# Patient Record
Sex: FEMALE | Race: WHITE | NOT HISPANIC OR LATINO | URBAN - METROPOLITAN AREA
[De-identification: names, ages, dates, MRNs, and addresses within clinical notes are randomized per-mention and may not be internally consistent; named-entity substitution may affect disease eponyms.]

---

## 2017-01-01 ENCOUNTER — INPATIENT HOSPITAL (OUTPATIENT)
Dept: URBAN - METROPOLITAN AREA HOSPITAL 107 | Facility: HOSPITAL | Age: 72
End: 2017-01-01
Payer: MEDICARE

## 2017-01-01 DIAGNOSIS — K92.2 GASTROINTESTINAL HEMORRHAGE, UNSPECIFIED: ICD-10-CM

## 2017-01-01 DIAGNOSIS — K92.1 MELENA: ICD-10-CM

## 2017-01-01 PROCEDURE — 99232 SBSQ HOSP IP/OBS MODERATE 35: CPT | Performed by: INTERNAL MEDICINE

## 2017-01-02 ENCOUNTER — INPATIENT HOSPITAL (OUTPATIENT)
Dept: URBAN - METROPOLITAN AREA HOSPITAL 107 | Facility: HOSPITAL | Age: 72
End: 2017-01-02
Payer: MEDICARE

## 2017-01-02 DIAGNOSIS — K44.9 DIAPHRAGMATIC HERNIA WITHOUT OBSTRUCTION OR GANGRENE: ICD-10-CM

## 2017-01-02 DIAGNOSIS — K92.2 GASTROINTESTINAL HEMORRHAGE, UNSPECIFIED: ICD-10-CM

## 2017-01-02 DIAGNOSIS — D12.5 BENIGN NEOPLASM OF SIGMOID COLON: ICD-10-CM

## 2017-01-02 PROCEDURE — 45385 COLONOSCOPY W/LESION REMOVAL: CPT | Performed by: INTERNAL MEDICINE

## 2017-01-02 PROCEDURE — 43239 EGD BIOPSY SINGLE/MULTIPLE: CPT | Performed by: INTERNAL MEDICINE

## 2017-01-03 ENCOUNTER — INPATIENT HOSPITAL (OUTPATIENT)
Dept: URBAN - METROPOLITAN AREA HOSPITAL 107 | Facility: HOSPITAL | Age: 72
End: 2017-01-03
Payer: MEDICARE

## 2017-01-03 DIAGNOSIS — D64.9 ANEMIA, UNSPECIFIED: ICD-10-CM

## 2017-01-03 DIAGNOSIS — K92.2 GASTROINTESTINAL HEMORRHAGE, UNSPECIFIED: ICD-10-CM

## 2017-01-03 PROCEDURE — 99231 SBSQ HOSP IP/OBS SF/LOW 25: CPT | Performed by: PHYSICIAN ASSISTANT

## 2017-01-04 PROCEDURE — 99232 SBSQ HOSP IP/OBS MODERATE 35: CPT | Performed by: PHYSICIAN ASSISTANT

## 2017-01-05 PROCEDURE — 99231 SBSQ HOSP IP/OBS SF/LOW 25: CPT | Performed by: PHYSICIAN ASSISTANT

## 2017-01-06 ENCOUNTER — INPATIENT HOSPITAL (OUTPATIENT)
Dept: URBAN - METROPOLITAN AREA HOSPITAL 107 | Facility: HOSPITAL | Age: 72
End: 2017-01-06
Payer: MEDICARE

## 2017-01-06 DIAGNOSIS — Z79.82 LONG TERM (CURRENT) USE OF ASPIRIN: ICD-10-CM

## 2017-01-06 DIAGNOSIS — K92.2 GASTROINTESTINAL HEMORRHAGE, UNSPECIFIED: ICD-10-CM

## 2017-01-06 DIAGNOSIS — Z79.01 LONG TERM (CURRENT) USE OF ANTICOAGULANTS: ICD-10-CM

## 2017-01-06 PROCEDURE — 99232 SBSQ HOSP IP/OBS MODERATE 35: CPT

## 2017-01-12 ENCOUNTER — OFFICE (OUTPATIENT)
Dept: URBAN - METROPOLITAN AREA CLINIC 75 | Facility: CLINIC | Age: 72
End: 2017-01-12

## 2017-01-12 DIAGNOSIS — Z98.890 OTHER SPECIFIED POSTPROCEDURAL STATES: ICD-10-CM

## 2017-01-12 DIAGNOSIS — D50.0 IRON DEFICIENCY ANEMIA SECONDARY TO BLOOD LOSS (CHRONIC): ICD-10-CM

## 2017-01-12 PROCEDURE — 91110 GI TRC IMG INTRAL ESOPH-ILE: CPT | Performed by: INTERNAL MEDICINE

## 2017-09-28 ENCOUNTER — APPOINTMENT (OUTPATIENT)
Dept: WOMENS IMAGING | Facility: HOSPITAL | Age: 72
End: 2017-09-28

## 2017-09-28 PROCEDURE — G0202 SCR MAMMO BI INCL CAD: HCPCS | Performed by: RADIOLOGY

## 2018-01-01 ENCOUNTER — INPATIENT HOSPITAL (OUTPATIENT)
Dept: URBAN - METROPOLITAN AREA HOSPITAL 107 | Facility: HOSPITAL | Age: 73
End: 2018-01-01
Payer: MEDICARE

## 2018-01-01 ENCOUNTER — OFFICE VISIT CONVERTED (OUTPATIENT)
Dept: PULMONOLOGY | Facility: CLINIC | Age: 73
End: 2018-01-01
Attending: INTERNAL MEDICINE

## 2018-01-01 ENCOUNTER — OFFICE (OUTPATIENT)
Dept: URBAN - METROPOLITAN AREA CLINIC 75 | Facility: CLINIC | Age: 73
End: 2018-01-01
Payer: MEDICARE

## 2018-01-01 VITALS
WEIGHT: 124 LBS | SYSTOLIC BLOOD PRESSURE: 122 MMHG | HEART RATE: 77 BPM | DIASTOLIC BLOOD PRESSURE: 80 MMHG | HEIGHT: 60 IN

## 2018-01-01 DIAGNOSIS — R10.13 EPIGASTRIC PAIN: ICD-10-CM

## 2018-01-01 DIAGNOSIS — K62.3 RECTAL PROLAPSE: ICD-10-CM

## 2018-01-01 DIAGNOSIS — R10.11 RIGHT UPPER QUADRANT PAIN: ICD-10-CM

## 2018-01-01 DIAGNOSIS — K31.82 DIEULAFOY LESION (HEMORRHAGIC) OF STOMACH AND DUODENUM: ICD-10-CM

## 2018-01-01 DIAGNOSIS — R10.12 LEFT UPPER QUADRANT PAIN: ICD-10-CM

## 2018-01-01 DIAGNOSIS — R19.7 DIARRHEA, UNSPECIFIED: ICD-10-CM

## 2018-01-01 DIAGNOSIS — R93.3 ABNORMAL FINDINGS ON DIAGNOSTIC IMAGING OF OTHER PARTS OF DI: ICD-10-CM

## 2018-01-01 DIAGNOSIS — K31.89 OTHER DISEASES OF STOMACH AND DUODENUM: ICD-10-CM

## 2018-01-01 DIAGNOSIS — R10.31 RIGHT LOWER QUADRANT PAIN: ICD-10-CM

## 2018-01-01 DIAGNOSIS — R10.32 LEFT LOWER QUADRANT PAIN: ICD-10-CM

## 2018-01-01 PROCEDURE — 99232 SBSQ HOSP IP/OBS MODERATE 35: CPT | Performed by: PHYSICIAN ASSISTANT

## 2018-01-01 PROCEDURE — 99214 OFFICE O/P EST MOD 30 MIN: CPT

## 2018-01-01 PROCEDURE — 99223 1ST HOSP IP/OBS HIGH 75: CPT | Performed by: INTERNAL MEDICINE

## 2018-01-01 RX ORDER — DICYCLOMINE HYDROCHLORIDE 10 MG/1
CAPSULE ORAL
Qty: 60 | Refills: 1 | Status: ACTIVE
Start: 2018-01-01

## 2018-03-15 ENCOUNTER — LAB REQUISITION (OUTPATIENT)
Dept: LAB | Facility: HOSPITAL | Age: 73
End: 2018-03-15

## 2018-03-15 DIAGNOSIS — D64.9 ANEMIA: ICD-10-CM

## 2018-03-15 LAB
HCT VFR BLD AUTO: 26.9 % (ref 35.6–45.5)
HGB BLD-MCNC: 8.4 G/DL (ref 11.9–15.5)

## 2018-03-15 PROCEDURE — 85014 HEMATOCRIT: CPT

## 2018-03-15 PROCEDURE — 85018 HEMOGLOBIN: CPT

## 2018-03-19 ENCOUNTER — INPATIENT HOSPITAL (OUTPATIENT)
Dept: URBAN - METROPOLITAN AREA HOSPITAL 107 | Facility: HOSPITAL | Age: 73
End: 2018-03-19
Payer: MEDICARE

## 2018-03-19 DIAGNOSIS — D50.9 IRON DEFICIENCY ANEMIA, UNSPECIFIED: ICD-10-CM

## 2018-03-19 DIAGNOSIS — K92.1 MELENA: ICD-10-CM

## 2018-03-19 PROCEDURE — 99223 1ST HOSP IP/OBS HIGH 75: CPT | Performed by: INTERNAL MEDICINE

## 2018-03-20 ENCOUNTER — INPATIENT HOSPITAL (OUTPATIENT)
Dept: URBAN - METROPOLITAN AREA HOSPITAL 107 | Facility: HOSPITAL | Age: 73
End: 2018-03-20
Payer: MEDICARE

## 2018-03-20 DIAGNOSIS — K31.82 DIEULAFOY LESION (HEMORRHAGIC) OF STOMACH AND DUODENUM: ICD-10-CM

## 2018-03-20 DIAGNOSIS — K92.1 MELENA: ICD-10-CM

## 2018-03-20 DIAGNOSIS — D50.0 IRON DEFICIENCY ANEMIA SECONDARY TO BLOOD LOSS (CHRONIC): ICD-10-CM

## 2018-03-20 PROCEDURE — 43255 EGD CONTROL BLEEDING ANY: CPT

## 2018-03-21 ENCOUNTER — INPATIENT HOSPITAL (OUTPATIENT)
Dept: URBAN - METROPOLITAN AREA HOSPITAL 107 | Facility: HOSPITAL | Age: 73
End: 2018-03-21
Payer: MEDICARE

## 2018-03-21 DIAGNOSIS — D64.9 ANEMIA, UNSPECIFIED: ICD-10-CM

## 2018-03-21 DIAGNOSIS — R06.00 DYSPNEA, UNSPECIFIED: ICD-10-CM

## 2018-03-21 DIAGNOSIS — K92.2 GASTROINTESTINAL HEMORRHAGE, UNSPECIFIED: ICD-10-CM

## 2018-03-21 PROCEDURE — 99231 SBSQ HOSP IP/OBS SF/LOW 25: CPT | Performed by: INTERNAL MEDICINE

## 2018-03-22 ENCOUNTER — INPATIENT HOSPITAL (OUTPATIENT)
Dept: URBAN - METROPOLITAN AREA HOSPITAL 107 | Facility: HOSPITAL | Age: 73
End: 2018-03-22
Payer: MEDICARE

## 2018-03-22 DIAGNOSIS — D64.9 ANEMIA, UNSPECIFIED: ICD-10-CM

## 2018-03-22 DIAGNOSIS — K92.2 GASTROINTESTINAL HEMORRHAGE, UNSPECIFIED: ICD-10-CM

## 2018-03-22 DIAGNOSIS — K92.1 MELENA: ICD-10-CM

## 2018-03-22 PROCEDURE — 99231 SBSQ HOSP IP/OBS SF/LOW 25: CPT | Performed by: PHYSICIAN ASSISTANT

## 2018-03-23 PROCEDURE — 99231 SBSQ HOSP IP/OBS SF/LOW 25: CPT | Performed by: PHYSICIAN ASSISTANT

## 2018-04-30 ENCOUNTER — INPATIENT HOSPITAL (OUTPATIENT)
Dept: URBAN - METROPOLITAN AREA HOSPITAL 107 | Facility: HOSPITAL | Age: 73
End: 2018-04-30
Payer: MEDICARE

## 2018-04-30 DIAGNOSIS — K25.9 GASTRIC ULCER, UNSPECIFIED AS ACUTE OR CHRONIC, WITHOUT HEMO: ICD-10-CM

## 2018-04-30 DIAGNOSIS — K44.9 DIAPHRAGMATIC HERNIA WITHOUT OBSTRUCTION OR GANGRENE: ICD-10-CM

## 2018-04-30 DIAGNOSIS — K92.1 MELENA: ICD-10-CM

## 2018-04-30 PROCEDURE — 43239 EGD BIOPSY SINGLE/MULTIPLE: CPT | Performed by: INTERNAL MEDICINE

## 2018-05-01 ENCOUNTER — ON CAMPUS - OUTPATIENT (OUTPATIENT)
Dept: URBAN - METROPOLITAN AREA HOSPITAL 108 | Facility: HOSPITAL | Age: 73
End: 2018-05-01

## 2018-05-01 DIAGNOSIS — D64.9 ANEMIA, UNSPECIFIED: ICD-10-CM

## 2018-05-01 DIAGNOSIS — D12.0 BENIGN NEOPLASM OF CECUM: ICD-10-CM

## 2018-05-01 DIAGNOSIS — K92.2 GASTROINTESTINAL HEMORRHAGE, UNSPECIFIED: ICD-10-CM

## 2018-05-01 DIAGNOSIS — D12.2 BENIGN NEOPLASM OF ASCENDING COLON: ICD-10-CM

## 2018-05-01 DIAGNOSIS — K57.30 DIVERTICULOSIS OF LARGE INTESTINE WITHOUT PERFORATION OR ABS: ICD-10-CM

## 2018-05-01 DIAGNOSIS — K62.1 RECTAL POLYP: ICD-10-CM

## 2018-05-01 DIAGNOSIS — D12.4 BENIGN NEOPLASM OF DESCENDING COLON: ICD-10-CM

## 2018-05-01 DIAGNOSIS — D12.3 BENIGN NEOPLASM OF TRANSVERSE COLON: ICD-10-CM

## 2018-05-01 DIAGNOSIS — D12.5 BENIGN NEOPLASM OF SIGMOID COLON: ICD-10-CM

## 2018-05-01 DIAGNOSIS — K92.1 MELENA: ICD-10-CM

## 2018-05-01 PROCEDURE — 45385 COLONOSCOPY W/LESION REMOVAL: CPT | Performed by: INTERNAL MEDICINE

## 2018-05-01 PROCEDURE — 45380 COLONOSCOPY AND BIOPSY: CPT | Mod: 59 | Performed by: INTERNAL MEDICINE

## 2018-05-16 ENCOUNTER — OFFICE (OUTPATIENT)
Dept: URBAN - METROPOLITAN AREA CLINIC 75 | Facility: CLINIC | Age: 73
End: 2018-05-16
Payer: MEDICARE

## 2018-05-16 VITALS
HEIGHT: 60 IN | HEART RATE: 111 BPM | WEIGHT: 124 LBS | DIASTOLIC BLOOD PRESSURE: 82 MMHG | SYSTOLIC BLOOD PRESSURE: 122 MMHG

## 2018-05-16 DIAGNOSIS — D50.9 IRON DEFICIENCY ANEMIA, UNSPECIFIED: ICD-10-CM

## 2018-05-16 DIAGNOSIS — K92.1 MELENA: ICD-10-CM

## 2018-05-16 DIAGNOSIS — Q27.33 ARTERIOVENOUS MALFORMATION OF DIGESTIVE SYSTEM VESSEL: ICD-10-CM

## 2018-05-16 DIAGNOSIS — K29.70 GASTRITIS, UNSPECIFIED, WITHOUT BLEEDING: ICD-10-CM

## 2018-05-16 PROCEDURE — 99214 OFFICE O/P EST MOD 30 MIN: CPT

## 2019-01-01 ENCOUNTER — HOSPITAL ENCOUNTER (INPATIENT)
Facility: HOSPITAL | Age: 74
LOS: 4 days | Discharge: HOME HEALTH | End: 2019-04-19
Attending: ORTHOPAEDIC SURGERY | Admitting: ORTHOPAEDIC SURGERY

## 2019-01-01 ENCOUNTER — HOSPITAL ENCOUNTER (INPATIENT)
Facility: HOSPITAL | Age: 74
LOS: 5 days | Discharge: HOME HEALTH | End: 2019-04-25
Attending: INTERNAL MEDICINE | Admitting: HOSPITALIST

## 2019-01-01 ENCOUNTER — APPOINTMENT (OUTPATIENT)
Dept: GENERAL RADIOLOGY | Facility: HOSPITAL | Age: 74
End: 2019-01-01

## 2019-01-01 ENCOUNTER — READMISSION MANAGEMENT (OUTPATIENT)
Dept: CALL CENTER | Facility: HOSPITAL | Age: 74
End: 2019-01-01

## 2019-01-01 ENCOUNTER — HOSPITAL ENCOUNTER (INPATIENT)
Facility: HOSPITAL | Age: 74
LOS: 8 days | Discharge: HOSPICE | End: 2019-07-02
Attending: EMERGENCY MEDICINE | Admitting: INTERNAL MEDICINE

## 2019-01-01 ENCOUNTER — ANESTHESIA (OUTPATIENT)
Dept: PERIOP | Facility: HOSPITAL | Age: 74
End: 2019-01-01

## 2019-01-01 ENCOUNTER — HOSPITAL ENCOUNTER (INPATIENT)
Facility: HOSPITAL | Age: 74
LOS: 1 days | End: 2019-07-03
Attending: INTERNAL MEDICINE | Admitting: INTERNAL MEDICINE

## 2019-01-01 ENCOUNTER — ANESTHESIA EVENT (OUTPATIENT)
Dept: PERIOP | Facility: HOSPITAL | Age: 74
End: 2019-01-01

## 2019-01-01 ENCOUNTER — APPOINTMENT (OUTPATIENT)
Dept: CARDIOLOGY | Facility: HOSPITAL | Age: 74
End: 2019-01-01

## 2019-01-01 ENCOUNTER — APPOINTMENT (OUTPATIENT)
Dept: CT IMAGING | Facility: HOSPITAL | Age: 74
End: 2019-01-01

## 2019-01-01 ENCOUNTER — HOSPITAL ENCOUNTER (INPATIENT)
Facility: HOSPITAL | Age: 74
LOS: 8 days | Discharge: HOME HEALTH | End: 2019-06-20
Attending: EMERGENCY MEDICINE | Admitting: ORTHOPAEDIC SURGERY

## 2019-01-01 ENCOUNTER — APPOINTMENT (OUTPATIENT)
Dept: PREADMISSION TESTING | Facility: HOSPITAL | Age: 74
End: 2019-01-01

## 2019-01-01 ENCOUNTER — HOSPITAL ENCOUNTER (OUTPATIENT)
Dept: GENERAL RADIOLOGY | Facility: HOSPITAL | Age: 74
Discharge: HOME | End: 2019-04-10

## 2019-01-01 ENCOUNTER — HOSPITAL ENCOUNTER (OUTPATIENT)
Facility: HOSPITAL | Age: 74
Setting detail: OBSERVATION
Discharge: HOME | End: 2019-05-31
Attending: EMERGENCY MEDICINE | Admitting: ORTHOPAEDIC SURGERY

## 2019-01-01 ENCOUNTER — HOSPITAL ENCOUNTER (OUTPATIENT)
Dept: GENERAL RADIOLOGY | Facility: HOSPITAL | Age: 74
Discharge: HOME | End: 2019-04-10
Admitting: ORTHOPAEDIC SURGERY

## 2019-01-01 ENCOUNTER — OFFICE (OUTPATIENT)
Dept: URBAN - METROPOLITAN AREA CLINIC 75 | Facility: CLINIC | Age: 74
End: 2019-01-01
Payer: MEDICARE

## 2019-01-01 VITALS
BODY MASS INDEX: 21.64 KG/M2 | HEIGHT: 60 IN | WEIGHT: 110.23 LBS | SYSTOLIC BLOOD PRESSURE: 148 MMHG | DIASTOLIC BLOOD PRESSURE: 97 MMHG | TEMPERATURE: 98.1 F | RESPIRATION RATE: 18 BRPM | HEART RATE: 108 BPM | OXYGEN SATURATION: 94 %

## 2019-01-01 VITALS
HEIGHT: 60 IN | RESPIRATION RATE: 18 BRPM | HEART RATE: 102 BPM | SYSTOLIC BLOOD PRESSURE: 130 MMHG | BODY MASS INDEX: 22.09 KG/M2 | TEMPERATURE: 98.2 F | WEIGHT: 112.5 LBS | DIASTOLIC BLOOD PRESSURE: 69 MMHG | OXYGEN SATURATION: 90 %

## 2019-01-01 VITALS
HEART RATE: 69 BPM | SYSTOLIC BLOOD PRESSURE: 88 MMHG | BODY MASS INDEX: 23.76 KG/M2 | HEIGHT: 60 IN | DIASTOLIC BLOOD PRESSURE: 61 MMHG | WEIGHT: 121.03 LBS | OXYGEN SATURATION: 88 % | RESPIRATION RATE: 3 BRPM | TEMPERATURE: 96.9 F

## 2019-01-01 VITALS
WEIGHT: 110 LBS | HEART RATE: 86 BPM | RESPIRATION RATE: 16 BRPM | SYSTOLIC BLOOD PRESSURE: 142 MMHG | DIASTOLIC BLOOD PRESSURE: 84 MMHG | BODY MASS INDEX: 21.6 KG/M2 | OXYGEN SATURATION: 93 % | TEMPERATURE: 98 F | HEIGHT: 60 IN

## 2019-01-01 VITALS
OXYGEN SATURATION: 96 % | HEIGHT: 60 IN | HEART RATE: 101 BPM | BODY MASS INDEX: 21.2 KG/M2 | TEMPERATURE: 97.9 F | RESPIRATION RATE: 16 BRPM | SYSTOLIC BLOOD PRESSURE: 149 MMHG | DIASTOLIC BLOOD PRESSURE: 75 MMHG | WEIGHT: 108 LBS

## 2019-01-01 VITALS
WEIGHT: 110 LBS | HEIGHT: 60 IN | DIASTOLIC BLOOD PRESSURE: 62 MMHG | SYSTOLIC BLOOD PRESSURE: 124 MMHG | HEART RATE: 64 BPM

## 2019-01-01 VITALS
DIASTOLIC BLOOD PRESSURE: 81 MMHG | WEIGHT: 107.2 LBS | HEART RATE: 64 BPM | BODY MASS INDEX: 21.05 KG/M2 | OXYGEN SATURATION: 100 % | HEIGHT: 60 IN | TEMPERATURE: 97.4 F | SYSTOLIC BLOOD PRESSURE: 131 MMHG | RESPIRATION RATE: 20 BRPM

## 2019-01-01 DIAGNOSIS — K29.70 GASTRITIS, UNSPECIFIED, WITHOUT BLEEDING: ICD-10-CM

## 2019-01-01 DIAGNOSIS — J96.02 ACUTE RESPIRATORY FAILURE WITH HYPERCAPNIA (HCC): Primary | ICD-10-CM

## 2019-01-01 DIAGNOSIS — M16.10 PRIMARY OSTEOARTHRITIS OF HIP, UNSPECIFIED LATERALITY: ICD-10-CM

## 2019-01-01 DIAGNOSIS — R13.12 OROPHARYNGEAL DYSPHAGIA: ICD-10-CM

## 2019-01-01 DIAGNOSIS — R19.7 DIARRHEA, UNSPECIFIED: ICD-10-CM

## 2019-01-01 DIAGNOSIS — R26.2 DIFFICULTY WALKING: ICD-10-CM

## 2019-01-01 DIAGNOSIS — K62.3 RECTAL PROLAPSE: ICD-10-CM

## 2019-01-01 DIAGNOSIS — J96.21 ACUTE ON CHRONIC RESPIRATORY FAILURE WITH HYPOXIA AND HYPERCAPNIA (HCC): ICD-10-CM

## 2019-01-01 DIAGNOSIS — S73.005A DISLOCATION OF LEFT HIP, INITIAL ENCOUNTER (HCC): Primary | ICD-10-CM

## 2019-01-01 DIAGNOSIS — R26.2 DIFFICULTY WALKING: Primary | ICD-10-CM

## 2019-01-01 DIAGNOSIS — R26.89 DECREASED MOBILITY: ICD-10-CM

## 2019-01-01 DIAGNOSIS — J96.11 CHRONIC HYPOXEMIC RESPIRATORY FAILURE (HCC): ICD-10-CM

## 2019-01-01 DIAGNOSIS — T84.021A: ICD-10-CM

## 2019-01-01 DIAGNOSIS — I48.0 PAROXYSMAL ATRIAL FIBRILLATION (HCC): ICD-10-CM

## 2019-01-01 DIAGNOSIS — R10.10 UPPER ABDOMINAL PAIN, UNSPECIFIED: ICD-10-CM

## 2019-01-01 DIAGNOSIS — Z74.09 IMPAIRED FUNCTIONAL MOBILITY, BALANCE, GAIT, AND ENDURANCE: Primary | ICD-10-CM

## 2019-01-01 DIAGNOSIS — J96.22 ACUTE ON CHRONIC RESPIRATORY FAILURE WITH HYPOXIA AND HYPERCAPNIA (HCC): ICD-10-CM

## 2019-01-01 DIAGNOSIS — S73.005A CLOSED DISLOCATION OF LEFT HIP, INITIAL ENCOUNTER (HCC): Primary | ICD-10-CM

## 2019-01-01 DIAGNOSIS — J44.9 CHRONIC OBSTRUCTIVE PULMONARY DISEASE, UNSPECIFIED COPD TYPE (HCC): ICD-10-CM

## 2019-01-01 LAB
ABO + RH BLD: NORMAL
ABO GROUP BLD: NORMAL
ALBUMIN SERPL-MCNC: 3 G/DL (ref 3.5–5.2)
ALBUMIN SERPL-MCNC: 3.4 G/DL (ref 3.5–5.2)
ALBUMIN SERPL-MCNC: 3.6 G/DL (ref 3.5–5.2)
ALBUMIN SERPL-MCNC: 4 G/DL (ref 3.5–5.2)
ALBUMIN/GLOB SERPL: 1.3 G/DL
ALBUMIN/GLOB SERPL: 1.4 G/DL
ALBUMIN/GLOB SERPL: 1.5 G/DL
ALBUMIN/GLOB SERPL: 1.6 G/DL
ALP SERPL-CCNC: 101 U/L (ref 39–117)
ALP SERPL-CCNC: 130 U/L (ref 39–117)
ALP SERPL-CCNC: 70 U/L (ref 39–117)
ALP SERPL-CCNC: 84 U/L (ref 39–117)
ALT SERPL W P-5'-P-CCNC: 12 U/L (ref 1–33)
ALT SERPL W P-5'-P-CCNC: 14 U/L (ref 1–33)
ALT SERPL W P-5'-P-CCNC: 15 U/L (ref 1–33)
ALT SERPL W P-5'-P-CCNC: 24 U/L (ref 1–33)
ANION GAP SERPL CALCULATED.3IONS-SCNC: 10 MMOL/L
ANION GAP SERPL CALCULATED.3IONS-SCNC: 10.1 MMOL/L
ANION GAP SERPL CALCULATED.3IONS-SCNC: 10.3 MMOL/L
ANION GAP SERPL CALCULATED.3IONS-SCNC: 10.4 MMOL/L
ANION GAP SERPL CALCULATED.3IONS-SCNC: 10.5 MMOL/L
ANION GAP SERPL CALCULATED.3IONS-SCNC: 11 MMOL/L
ANION GAP SERPL CALCULATED.3IONS-SCNC: 11.1 MMOL/L
ANION GAP SERPL CALCULATED.3IONS-SCNC: 11.9 MMOL/L
ANION GAP SERPL CALCULATED.3IONS-SCNC: 12.7 MMOL/L
ANION GAP SERPL CALCULATED.3IONS-SCNC: 12.7 MMOL/L
ANION GAP SERPL CALCULATED.3IONS-SCNC: 13.1 MMOL/L
ANION GAP SERPL CALCULATED.3IONS-SCNC: 14.7 MMOL/L (ref 5–15)
ANION GAP SERPL CALCULATED.3IONS-SCNC: 15.9 MMOL/L
ANION GAP SERPL CALCULATED.3IONS-SCNC: 16.8 MMOL/L
ANION GAP SERPL CALCULATED.3IONS-SCNC: 5.7 MMOL/L
ANION GAP SERPL CALCULATED.3IONS-SCNC: 6.2 MMOL/L
ANION GAP SERPL CALCULATED.3IONS-SCNC: 6.6 MMOL/L
ANION GAP SERPL CALCULATED.3IONS-SCNC: 7 MMOL/L (ref 5–15)
ANION GAP SERPL CALCULATED.3IONS-SCNC: 7.3 MMOL/L
ANION GAP SERPL CALCULATED.3IONS-SCNC: 7.4 MMOL/L
ANION GAP SERPL CALCULATED.3IONS-SCNC: 7.6 MMOL/L
ANION GAP SERPL CALCULATED.3IONS-SCNC: 8 MMOL/L
ANION GAP SERPL CALCULATED.3IONS-SCNC: 8.8 MMOL/L
ANION GAP SERPL CALCULATED.3IONS-SCNC: 9.1 MMOL/L
ANISOCYTOSIS BLD QL: ABNORMAL
APTT PPP: 23.7 SECONDS (ref 22.7–35.4)
ARTERIAL PATENCY WRIST A: ABNORMAL
ARTERIAL PATENCY WRIST A: NORMAL
ARTERIAL PATENCY WRIST A: POSITIVE
AST SERPL-CCNC: 12 U/L (ref 1–32)
AST SERPL-CCNC: 16 U/L (ref 1–32)
AST SERPL-CCNC: 18 U/L (ref 1–32)
AST SERPL-CCNC: 21 U/L (ref 1–32)
ATMOSPHERIC PRESS: 745.8 MMHG
ATMOSPHERIC PRESS: 747.9 MMHG
ATMOSPHERIC PRESS: 748.5 MMHG
ATMOSPHERIC PRESS: 749.1 MMHG
ATMOSPHERIC PRESS: 749.5 MMHG
ATMOSPHERIC PRESS: 750.1 MMHG
ATMOSPHERIC PRESS: 752 MMHG
ATMOSPHERIC PRESS: 752.2 MMHG
ATMOSPHERIC PRESS: 753.7 MMHG
ATMOSPHERIC PRESS: 757.4 MMHG
ATMOSPHERIC PRESS: 758 MMHG
ATMOSPHERIC PRESS: NORMAL MMHG
B PARAPERT DNA SPEC QL NAA+PROBE: NOT DETECTED
B PARAPERT DNA SPEC QL NAA+PROBE: NOT DETECTED
B PERT DNA SPEC QL NAA+PROBE: NOT DETECTED
B PERT DNA SPEC QL NAA+PROBE: NOT DETECTED
BACTERIA SPEC AEROBE CULT: NORMAL
BACTERIA SPEC ANAEROBE CULT: NORMAL
BASE EXCESS BLDA CALC-SCNC: 12.6 MMOL/L (ref 0–2)
BASE EXCESS BLDA CALC-SCNC: 12.8 MMOL/L (ref 0–2)
BASE EXCESS BLDA CALC-SCNC: 13.1 MMOL/L (ref 0–2)
BASE EXCESS BLDA CALC-SCNC: 13.4 MMOL/L (ref 0–2)
BASE EXCESS BLDA CALC-SCNC: 16 MMOL/L (ref 0–2)
BASE EXCESS BLDA CALC-SCNC: 19.9 MMOL/L (ref 0–2)
BASE EXCESS BLDA CALC-SCNC: 20.3 MMOL/L (ref 0–2)
BASE EXCESS BLDA CALC-SCNC: 22.7 MMOL/L (ref 0–2)
BASE EXCESS BLDA CALC-SCNC: 24 MMOL/L (ref 0–2)
BASE EXCESS BLDA CALC-SCNC: 8.5 MMOL/L (ref 0–2)
BASE EXCESS BLDA CALC-SCNC: 8.8 MMOL/L (ref 0–2)
BASE EXCESS BLDA CALC-SCNC: NORMAL MMOL/L (ref 0–2)
BASOPHILS # BLD AUTO: 0 10*3/MM3 (ref 0–0.2)
BASOPHILS # BLD AUTO: 0 10*3/MM3 (ref 0–0.2)
BASOPHILS # BLD AUTO: 0.01 10*3/MM3 (ref 0–0.2)
BASOPHILS # BLD AUTO: 0.02 10*3/MM3 (ref 0–0.2)
BASOPHILS NFR BLD AUTO: 0 % (ref 0–1.5)
BASOPHILS NFR BLD AUTO: 0 % (ref 0–1.5)
BASOPHILS NFR BLD AUTO: 0.1 % (ref 0–1.5)
BASOPHILS NFR BLD AUTO: 0.2 % (ref 0–1.5)
BDY SITE: ABNORMAL
BDY SITE: NORMAL
BH BB BLOOD EXPIRATION DATE: NORMAL
BH BB BLOOD TYPE BARCODE: 6200
BH BB DISPENSE STATUS: NORMAL
BH BB PRODUCT CODE: NORMAL
BH BB UNIT NUMBER: NORMAL
BH CV GRAFT BRACHIAL PRESSURE LEFT: 158 MMHG
BH CV LEA LEFT CFA DISTAL EDV: 9 CM/S
BH CV LEA LEFT CFA DISTAL PSV: 130 CM/S
BH CV LEA LEFT CFA PROX EDV: 8 CM/S
BH CV LEA LEFT CFA PROX PSV: 67 CM/S
BH CV LEA LEFT DPA PRESSURE: 126 MMHG
BH CV LEA LEFT PTA PRESSURE: 107 MMHG
BH CV LEA RIGHT CFA PROX PSV: 179 CM/S
BH CV LEA RIGHT DPA PRESSURE: 123 MMHG
BH CV LEA RIGHT PTA PRESSURE: 123 MMHG
BH CV LEA RIGHT SFA PROX EDV: 16 CM/S
BH CV LEA RIGHT SFA PROX PSV: 143 CM/S
BH CV LOW VAS RIGHT GASTRONEMIUS VESSEL: 1
BH CV LOWER ARTERIAL LEFT ABI RATIO: 0.68
BH CV LOWER ARTERIAL LEFT ABI RATIO: 0.8
BH CV LOWER ARTERIAL LEFT DORSALIS PEDIS SYS MAX: 126 MMHG
BH CV LOWER ARTERIAL LEFT GREAT TOE SYS MAX: 98 MMHG
BH CV LOWER ARTERIAL LEFT POST TIBIAL SYS MAX: 107 MMHG
BH CV LOWER ARTERIAL LEFT TBI RATIO: 0.62
BH CV LOWER ARTERIAL RIGHT ABI RATIO: 0.78
BH CV LOWER ARTERIAL RIGHT ABI RATIO: 0.78
BH CV LOWER ARTERIAL RIGHT DORSALIS PEDIS SYS MAX: 123 MMHG
BH CV LOWER ARTERIAL RIGHT GREAT TOE SYS MAX: 66 MMHG
BH CV LOWER ARTERIAL RIGHT POST TIBIAL SYS MAX: 123 MMHG
BH CV LOWER ARTERIAL RIGHT TBI RATIO: 0.42
BH CV LOWER VASCULAR LEFT COMMON FEMORAL AUGMENT: NORMAL
BH CV LOWER VASCULAR LEFT COMMON FEMORAL COMPETENT: NORMAL
BH CV LOWER VASCULAR LEFT COMMON FEMORAL COMPRESS: NORMAL
BH CV LOWER VASCULAR LEFT COMMON FEMORAL PHASIC: NORMAL
BH CV LOWER VASCULAR LEFT COMMON FEMORAL SPONT: NORMAL
BH CV LOWER VASCULAR LEFT DISTAL FEMORAL COMPRESS: NORMAL
BH CV LOWER VASCULAR LEFT GASTRONEMIUS COMPRESS: NORMAL
BH CV LOWER VASCULAR LEFT GREATER SAPH AK COMPRESS: NORMAL
BH CV LOWER VASCULAR LEFT GREATER SAPH BK COMPRESS: NORMAL
BH CV LOWER VASCULAR LEFT MID FEMORAL AUGMENT: NORMAL
BH CV LOWER VASCULAR LEFT MID FEMORAL COMPETENT: NORMAL
BH CV LOWER VASCULAR LEFT MID FEMORAL COMPRESS: NORMAL
BH CV LOWER VASCULAR LEFT MID FEMORAL PHASIC: NORMAL
BH CV LOWER VASCULAR LEFT MID FEMORAL SPONT: NORMAL
BH CV LOWER VASCULAR LEFT PERONEAL COMPRESS: NORMAL
BH CV LOWER VASCULAR LEFT POPLITEAL AUGMENT: NORMAL
BH CV LOWER VASCULAR LEFT POPLITEAL COMPETENT: NORMAL
BH CV LOWER VASCULAR LEFT POPLITEAL COMPRESS: NORMAL
BH CV LOWER VASCULAR LEFT POPLITEAL PHASIC: NORMAL
BH CV LOWER VASCULAR LEFT POPLITEAL SPONT: NORMAL
BH CV LOWER VASCULAR LEFT POSTERIOR TIBIAL COMPRESS: NORMAL
BH CV LOWER VASCULAR LEFT PROXIMAL FEMORAL COMPRESS: NORMAL
BH CV LOWER VASCULAR LEFT SAPHENOFEMORAL JUNCTION AUGMENT: NORMAL
BH CV LOWER VASCULAR LEFT SAPHENOFEMORAL JUNCTION COMPRESS: NORMAL
BH CV LOWER VASCULAR LEFT SAPHENOFEMORAL JUNCTION PHASIC: NORMAL
BH CV LOWER VASCULAR LEFT SAPHENOFEMORAL JUNCTION SPONT: NORMAL
BH CV LOWER VASCULAR RIGHT COMMON FEMORAL AUGMENT: NORMAL
BH CV LOWER VASCULAR RIGHT COMMON FEMORAL COMPETENT: NORMAL
BH CV LOWER VASCULAR RIGHT COMMON FEMORAL COMPRESS: NORMAL
BH CV LOWER VASCULAR RIGHT COMMON FEMORAL PHASIC: NORMAL
BH CV LOWER VASCULAR RIGHT COMMON FEMORAL SPONT: NORMAL
BH CV LOWER VASCULAR RIGHT DISTAL FEMORAL COMPRESS: NORMAL
BH CV LOWER VASCULAR RIGHT GASTRONEMIUS COMPRESS: NORMAL
BH CV LOWER VASCULAR RIGHT GASTRONEMIUS THROMBUS: NORMAL
BH CV LOWER VASCULAR RIGHT GREATER SAPH AK COMPRESS: NORMAL
BH CV LOWER VASCULAR RIGHT GREATER SAPH BK COMPRESS: NORMAL
BH CV LOWER VASCULAR RIGHT MID FEMORAL AUGMENT: NORMAL
BH CV LOWER VASCULAR RIGHT MID FEMORAL COMPETENT: NORMAL
BH CV LOWER VASCULAR RIGHT MID FEMORAL COMPRESS: NORMAL
BH CV LOWER VASCULAR RIGHT MID FEMORAL PHASIC: NORMAL
BH CV LOWER VASCULAR RIGHT MID FEMORAL SPONT: NORMAL
BH CV LOWER VASCULAR RIGHT PERONEAL COMPRESS: NORMAL
BH CV LOWER VASCULAR RIGHT POPLITEAL AUGMENT: NORMAL
BH CV LOWER VASCULAR RIGHT POPLITEAL COMPETENT: NORMAL
BH CV LOWER VASCULAR RIGHT POPLITEAL COMPRESS: NORMAL
BH CV LOWER VASCULAR RIGHT POPLITEAL PHASIC: NORMAL
BH CV LOWER VASCULAR RIGHT POPLITEAL SPONT: NORMAL
BH CV LOWER VASCULAR RIGHT POSTERIOR TIBIAL COMPRESS: NORMAL
BH CV LOWER VASCULAR RIGHT PROXIMAL FEMORAL COMPRESS: NORMAL
BH CV LOWER VASCULAR RIGHT SAPHENOFEMORAL JUNCTION AUGMENT: NORMAL
BH CV LOWER VASCULAR RIGHT SAPHENOFEMORAL JUNCTION COMPRESS: NORMAL
BH CV LOWER VASCULAR RIGHT SAPHENOFEMORAL JUNCTION PHASIC: NORMAL
BH CV LOWER VASCULAR RIGHT SAPHENOFEMORAL JUNCTION SPONT: NORMAL
BILIRUB SERPL-MCNC: 0.2 MG/DL (ref 0.2–1.2)
BILIRUB UR QL STRIP: NEGATIVE
BILIRUB UR QL STRIP: NEGATIVE
BLD GP AB SCN SERPL QL: NEGATIVE
BUN BLD-MCNC: 10 MG/DL (ref 8–23)
BUN BLD-MCNC: 11 MG/DL (ref 8–23)
BUN BLD-MCNC: 11 MG/DL (ref 8–23)
BUN BLD-MCNC: 13 MG/DL (ref 8–23)
BUN BLD-MCNC: 13 MG/DL (ref 8–23)
BUN BLD-MCNC: 3 MG/DL (ref 8–23)
BUN BLD-MCNC: 4 MG/DL (ref 8–23)
BUN BLD-MCNC: 5 MG/DL (ref 8–23)
BUN BLD-MCNC: 6 MG/DL (ref 8–23)
BUN BLD-MCNC: 7 MG/DL (ref 8–23)
BUN BLD-MCNC: 8 MG/DL (ref 8–23)
BUN BLD-MCNC: 9 MG/DL (ref 8–23)
BUN BLD-MCNC: 9 MG/DL (ref 8–23)
BUN/CREAT SERPL: 10 (ref 7–25)
BUN/CREAT SERPL: 17.1 (ref 7–25)
BUN/CREAT SERPL: 17.4 (ref 7–25)
BUN/CREAT SERPL: 17.6 (ref 7–25)
BUN/CREAT SERPL: 17.9 (ref 7–25)
BUN/CREAT SERPL: 21.1 (ref 7–25)
BUN/CREAT SERPL: 21.1 (ref 7–25)
BUN/CREAT SERPL: 22.2 (ref 7–25)
BUN/CREAT SERPL: 22.7 (ref 7–25)
BUN/CREAT SERPL: 22.9 (ref 7–25)
BUN/CREAT SERPL: 23.8 (ref 7–25)
BUN/CREAT SERPL: 24 (ref 7–25)
BUN/CREAT SERPL: 24.1 (ref 7–25)
BUN/CREAT SERPL: 25 (ref 7–25)
BUN/CREAT SERPL: 25 (ref 7–25)
BUN/CREAT SERPL: 26.1 (ref 7–25)
BUN/CREAT SERPL: 28 (ref 7–25)
BUN/CREAT SERPL: 30 (ref 7–25)
BUN/CREAT SERPL: 35.5 (ref 7–25)
BUN/CREAT SERPL: 38.9 (ref 7–25)
BUN/CREAT SERPL: 39.1 (ref 7–25)
BUN/CREAT SERPL: 39.3 (ref 7–25)
BUN/CREAT SERPL: 39.4 (ref 7–25)
BUN/CREAT SERPL: 41.2 (ref 7–25)
BUN/CREAT SERPL: 41.7 (ref 7–25)
BUN/CREAT SERPL: ABNORMAL (ref 7–25)
C DIFF TOX GENS STL QL NAA+PROBE: NEGATIVE
C PNEUM DNA NPH QL NAA+NON-PROBE: NOT DETECTED
C PNEUM DNA NPH QL NAA+NON-PROBE: NOT DETECTED
CALCIUM SPEC-SCNC: 10 MG/DL (ref 8.6–10.5)
CALCIUM SPEC-SCNC: 7.3 MG/DL (ref 8.6–10.5)
CALCIUM SPEC-SCNC: 8 MG/DL (ref 8.6–10.5)
CALCIUM SPEC-SCNC: 8.1 MG/DL (ref 8.6–10.5)
CALCIUM SPEC-SCNC: 8.1 MG/DL (ref 8.6–10.5)
CALCIUM SPEC-SCNC: 8.2 MG/DL (ref 8.6–10.5)
CALCIUM SPEC-SCNC: 8.2 MG/DL (ref 8.6–10.5)
CALCIUM SPEC-SCNC: 8.4 MG/DL (ref 8.6–10.5)
CALCIUM SPEC-SCNC: 8.5 MG/DL (ref 8.6–10.5)
CALCIUM SPEC-SCNC: 8.5 MG/DL (ref 8.6–10.5)
CALCIUM SPEC-SCNC: 8.6 MG/DL (ref 8.6–10.5)
CALCIUM SPEC-SCNC: 8.7 MG/DL (ref 8.6–10.5)
CALCIUM SPEC-SCNC: 8.7 MG/DL (ref 8.6–10.5)
CALCIUM SPEC-SCNC: 8.8 MG/DL (ref 8.6–10.5)
CALCIUM SPEC-SCNC: 8.9 MG/DL (ref 8.6–10.5)
CALCIUM SPEC-SCNC: 9 MG/DL (ref 8.6–10.5)
CALCIUM SPEC-SCNC: 9.1 MG/DL (ref 8.6–10.5)
CALCIUM SPEC-SCNC: 9.1 MG/DL (ref 8.6–10.5)
CALCIUM SPEC-SCNC: 9.6 MG/DL (ref 8.6–10.5)
CALCIUM SPEC-SCNC: 9.6 MG/DL (ref 8.6–10.5)
CHLORIDE SERPL-SCNC: 105 MMOL/L (ref 98–107)
CHLORIDE SERPL-SCNC: 80 MMOL/L (ref 98–107)
CHLORIDE SERPL-SCNC: 85 MMOL/L (ref 98–107)
CHLORIDE SERPL-SCNC: 85 MMOL/L (ref 98–107)
CHLORIDE SERPL-SCNC: 86 MMOL/L (ref 98–107)
CHLORIDE SERPL-SCNC: 87 MMOL/L (ref 98–107)
CHLORIDE SERPL-SCNC: 87 MMOL/L (ref 98–107)
CHLORIDE SERPL-SCNC: 88 MMOL/L (ref 98–107)
CHLORIDE SERPL-SCNC: 89 MMOL/L (ref 98–107)
CHLORIDE SERPL-SCNC: 89 MMOL/L (ref 98–107)
CHLORIDE SERPL-SCNC: 90 MMOL/L (ref 98–107)
CHLORIDE SERPL-SCNC: 90 MMOL/L (ref 98–107)
CHLORIDE SERPL-SCNC: 91 MMOL/L (ref 98–107)
CHLORIDE SERPL-SCNC: 92 MMOL/L (ref 98–107)
CHLORIDE SERPL-SCNC: 93 MMOL/L (ref 98–107)
CHLORIDE SERPL-SCNC: 93 MMOL/L (ref 98–107)
CHLORIDE SERPL-SCNC: 94 MMOL/L (ref 98–107)
CLARITY UR: CLEAR
CLARITY UR: CLEAR
CO2 SERPL-SCNC: 31.1 MMOL/L (ref 22–29)
CO2 SERPL-SCNC: 31.3 MMOL/L (ref 22–29)
CO2 SERPL-SCNC: 32 MMOL/L (ref 22–29)
CO2 SERPL-SCNC: 32.2 MMOL/L (ref 22–29)
CO2 SERPL-SCNC: 33.9 MMOL/L (ref 22–29)
CO2 SERPL-SCNC: 34.3 MMOL/L (ref 22–29)
CO2 SERPL-SCNC: 34.5 MMOL/L (ref 22–29)
CO2 SERPL-SCNC: 35.7 MMOL/L (ref 22–29)
CO2 SERPL-SCNC: 37.2 MMOL/L (ref 22–29)
CO2 SERPL-SCNC: 37.3 MMOL/L (ref 22–29)
CO2 SERPL-SCNC: 37.6 MMOL/L (ref 22–29)
CO2 SERPL-SCNC: 37.8 MMOL/L (ref 22–29)
CO2 SERPL-SCNC: 37.9 MMOL/L (ref 22–29)
CO2 SERPL-SCNC: 38.1 MMOL/L (ref 22–29)
CO2 SERPL-SCNC: 38.4 MMOL/L (ref 22–29)
CO2 SERPL-SCNC: 38.9 MMOL/L (ref 22–29)
CO2 SERPL-SCNC: 39.6 MMOL/L (ref 22–29)
CO2 SERPL-SCNC: 40.9 MMOL/L (ref 22–29)
CO2 SERPL-SCNC: 41 MMOL/L (ref 22–29)
CO2 SERPL-SCNC: 42.4 MMOL/L (ref 22–29)
CO2 SERPL-SCNC: 42.6 MMOL/L (ref 22–29)
CO2 SERPL-SCNC: 43 MMOL/L (ref 22–29)
CO2 SERPL-SCNC: 44.6 MMOL/L (ref 22–29)
CO2 SERPL-SCNC: 45.7 MMOL/L (ref 22–29)
CO2 SERPL-SCNC: 46.3 MMOL/L (ref 22–29)
CO2 SERPL-SCNC: 48 MMOL/L (ref 22–29)
COLOR UR: YELLOW
COLOR UR: YELLOW
CREAT BLD-MCNC: 0.17 MG/DL (ref 0.57–1)
CREAT BLD-MCNC: 0.18 MG/DL (ref 0.57–1)
CREAT BLD-MCNC: 0.21 MG/DL (ref 0.57–1)
CREAT BLD-MCNC: 0.22 MG/DL (ref 0.57–1)
CREAT BLD-MCNC: 0.23 MG/DL (ref 0.57–1)
CREAT BLD-MCNC: 0.24 MG/DL (ref 0.57–1)
CREAT BLD-MCNC: 0.24 MG/DL (ref 0.57–1)
CREAT BLD-MCNC: 0.25 MG/DL (ref 0.57–1)
CREAT BLD-MCNC: 0.25 MG/DL (ref 0.57–1)
CREAT BLD-MCNC: 0.27 MG/DL (ref 0.57–1)
CREAT BLD-MCNC: 0.28 MG/DL (ref 0.57–1)
CREAT BLD-MCNC: 0.28 MG/DL (ref 0.57–1)
CREAT BLD-MCNC: 0.29 MG/DL (ref 0.57–1)
CREAT BLD-MCNC: 0.3 MG/DL (ref 0.57–1)
CREAT BLD-MCNC: 0.3 MG/DL (ref 0.57–1)
CREAT BLD-MCNC: 0.31 MG/DL (ref 0.57–1)
CREAT BLD-MCNC: 0.33 MG/DL (ref 0.57–1)
CREAT BLD-MCNC: 0.34 MG/DL (ref 0.57–1)
CREAT BLD-MCNC: 0.35 MG/DL (ref 0.57–1)
CREAT BLD-MCNC: 0.35 MG/DL (ref 0.57–1)
CREAT BLD-MCNC: 0.38 MG/DL (ref 0.57–1)
CREAT BLD-MCNC: 0.38 MG/DL (ref 0.57–1)
CREAT BLD-MCNC: 0.52 MG/DL (ref 0.57–1)
CREAT BLD-MCNC: <0.17 MG/DL (ref 0.57–1)
DEPRECATED RDW RBC AUTO: 54.8 FL (ref 37–54)
DEPRECATED RDW RBC AUTO: 55.3 FL (ref 37–54)
DEPRECATED RDW RBC AUTO: 55.4 FL (ref 37–54)
DEPRECATED RDW RBC AUTO: 56 FL (ref 37–54)
DEPRECATED RDW RBC AUTO: 57.1 FL (ref 37–54)
DEPRECATED RDW RBC AUTO: 57.5 FL (ref 37–54)
DEPRECATED RDW RBC AUTO: 57.6 FL (ref 37–54)
DEPRECATED RDW RBC AUTO: 57.6 FL (ref 37–54)
DEPRECATED RDW RBC AUTO: 58.2 FL (ref 37–54)
DEPRECATED RDW RBC AUTO: 58.3 FL (ref 37–54)
DEPRECATED RDW RBC AUTO: 58.6 FL (ref 37–54)
DEPRECATED RDW RBC AUTO: 58.8 FL (ref 37–54)
DEPRECATED RDW RBC AUTO: 60.1 FL (ref 37–54)
DEPRECATED RDW RBC AUTO: 60.4 FL (ref 37–54)
DEPRECATED RDW RBC AUTO: 61.9 FL (ref 37–54)
DEPRECATED RDW RBC AUTO: 62.3 FL (ref 37–54)
DEPRECATED RDW RBC AUTO: 63.9 FL (ref 37–54)
DEPRECATED RDW RBC AUTO: 65.8 FL (ref 37–54)
DEPRECATED RDW RBC AUTO: 67.3 FL (ref 37–54)
DEPRECATED RDW RBC AUTO: 67.7 FL (ref 37–54)
DEPRECATED RDW RBC AUTO: 70.4 FL (ref 37–54)
EOSINOPHIL # BLD AUTO: 0 10*3/MM3 (ref 0–0.4)
EOSINOPHIL # BLD AUTO: 0.01 10*3/MM3 (ref 0–0.4)
EOSINOPHIL # BLD AUTO: 0.02 10*3/MM3 (ref 0–0.4)
EOSINOPHIL # BLD AUTO: 0.03 10*3/MM3 (ref 0–0.4)
EOSINOPHIL # BLD AUTO: 0.03 10*3/MM3 (ref 0–0.4)
EOSINOPHIL # BLD AUTO: 0.04 10*3/MM3 (ref 0–0.4)
EOSINOPHIL # BLD AUTO: 0.07 10*3/MM3 (ref 0–0.4)
EOSINOPHIL NFR BLD AUTO: 0 % (ref 0.3–6.2)
EOSINOPHIL NFR BLD AUTO: 0.1 % (ref 0.3–6.2)
EOSINOPHIL NFR BLD AUTO: 0.2 % (ref 0.3–6.2)
EOSINOPHIL NFR BLD AUTO: 0.3 % (ref 0.3–6.2)
EOSINOPHIL NFR BLD AUTO: 0.3 % (ref 0.3–6.2)
EOSINOPHIL NFR BLD AUTO: 0.4 % (ref 0.3–6.2)
EOSINOPHIL NFR BLD AUTO: 0.7 % (ref 0.3–6.2)
ERYTHROCYTE [DISTWIDTH] IN BLOOD BY AUTOMATED COUNT: 13.9 % (ref 12.3–15.4)
ERYTHROCYTE [DISTWIDTH] IN BLOOD BY AUTOMATED COUNT: 14.1 % (ref 12.3–15.4)
ERYTHROCYTE [DISTWIDTH] IN BLOOD BY AUTOMATED COUNT: 14.6 % (ref 12.3–15.4)
ERYTHROCYTE [DISTWIDTH] IN BLOOD BY AUTOMATED COUNT: 14.7 % (ref 12.3–15.4)
ERYTHROCYTE [DISTWIDTH] IN BLOOD BY AUTOMATED COUNT: 15 % (ref 12.3–15.4)
ERYTHROCYTE [DISTWIDTH] IN BLOOD BY AUTOMATED COUNT: 15.1 % (ref 12.3–15.4)
ERYTHROCYTE [DISTWIDTH] IN BLOOD BY AUTOMATED COUNT: 15.3 % (ref 12.3–15.4)
ERYTHROCYTE [DISTWIDTH] IN BLOOD BY AUTOMATED COUNT: 15.4 % (ref 12.3–15.4)
ERYTHROCYTE [DISTWIDTH] IN BLOOD BY AUTOMATED COUNT: 15.4 % (ref 12.3–15.4)
ERYTHROCYTE [DISTWIDTH] IN BLOOD BY AUTOMATED COUNT: 15.5 % (ref 12.3–15.4)
ERYTHROCYTE [DISTWIDTH] IN BLOOD BY AUTOMATED COUNT: 15.5 % (ref 12.3–15.4)
ERYTHROCYTE [DISTWIDTH] IN BLOOD BY AUTOMATED COUNT: 15.6 % (ref 12.3–15.4)
ERYTHROCYTE [DISTWIDTH] IN BLOOD BY AUTOMATED COUNT: 15.7 % (ref 12.3–15.4)
ERYTHROCYTE [DISTWIDTH] IN BLOOD BY AUTOMATED COUNT: 16 % (ref 12.3–15.4)
ERYTHROCYTE [DISTWIDTH] IN BLOOD BY AUTOMATED COUNT: 16.6 % (ref 12.3–15.4)
ERYTHROCYTE [DISTWIDTH] IN BLOOD BY AUTOMATED COUNT: 17.4 % (ref 12.3–15.4)
ERYTHROCYTE [DISTWIDTH] IN BLOOD BY AUTOMATED COUNT: 17.4 % (ref 12.3–15.4)
ERYTHROCYTE [DISTWIDTH] IN BLOOD BY AUTOMATED COUNT: 18.6 % (ref 12.3–15.4)
ERYTHROCYTE [DISTWIDTH] IN BLOOD BY AUTOMATED COUNT: 19 % (ref 12.3–15.4)
FLUAV H1 2009 PAND RNA NPH QL NAA+PROBE: NOT DETECTED
FLUAV H1 2009 PAND RNA NPH QL NAA+PROBE: NOT DETECTED
FLUAV H1 HA GENE NPH QL NAA+PROBE: NOT DETECTED
FLUAV H1 HA GENE NPH QL NAA+PROBE: NOT DETECTED
FLUAV H3 RNA NPH QL NAA+PROBE: NOT DETECTED
FLUAV H3 RNA NPH QL NAA+PROBE: NOT DETECTED
FLUAV SUBTYP SPEC NAA+PROBE: NOT DETECTED
FLUAV SUBTYP SPEC NAA+PROBE: NOT DETECTED
FLUBV RNA ISLT QL NAA+PROBE: NOT DETECTED
FLUBV RNA ISLT QL NAA+PROBE: NOT DETECTED
FOLATE SERPL-MCNC: 13.5 NG/ML (ref 4.78–24.2)
GAS FLOW AIRWAY: 2 LPM
GAS FLOW AIRWAY: 2.5 LPM
GAS FLOW AIRWAY: 3 LPM
GFR SERPL CREATININE-BSD FRML MDRD: 115 ML/MIN/1.73
GFR SERPL CREATININE-BSD FRML MDRD: >150 ML/MIN/1.73
GIANT PLATELETS: ABNORMAL
GLOBULIN UR ELPH-MCNC: 2.1 GM/DL
GLOBULIN UR ELPH-MCNC: 2.2 GM/DL
GLOBULIN UR ELPH-MCNC: 2.5 GM/DL
GLOBULIN UR ELPH-MCNC: 2.8 GM/DL
GLUCOSE BLD-MCNC: 104 MG/DL (ref 65–99)
GLUCOSE BLD-MCNC: 105 MG/DL (ref 65–99)
GLUCOSE BLD-MCNC: 111 MG/DL (ref 65–99)
GLUCOSE BLD-MCNC: 112 MG/DL (ref 65–99)
GLUCOSE BLD-MCNC: 116 MG/DL (ref 65–99)
GLUCOSE BLD-MCNC: 116 MG/DL (ref 65–99)
GLUCOSE BLD-MCNC: 118 MG/DL (ref 65–99)
GLUCOSE BLD-MCNC: 123 MG/DL (ref 65–99)
GLUCOSE BLD-MCNC: 125 MG/DL (ref 65–99)
GLUCOSE BLD-MCNC: 143 MG/DL (ref 65–99)
GLUCOSE BLD-MCNC: 156 MG/DL (ref 65–99)
GLUCOSE BLD-MCNC: 74 MG/DL (ref 65–99)
GLUCOSE BLD-MCNC: 80 MG/DL (ref 65–99)
GLUCOSE BLD-MCNC: 82 MG/DL (ref 65–99)
GLUCOSE BLD-MCNC: 84 MG/DL (ref 65–99)
GLUCOSE BLD-MCNC: 86 MG/DL (ref 65–99)
GLUCOSE BLD-MCNC: 88 MG/DL (ref 65–99)
GLUCOSE BLD-MCNC: 89 MG/DL (ref 65–99)
GLUCOSE BLD-MCNC: 89 MG/DL (ref 65–99)
GLUCOSE BLD-MCNC: 91 MG/DL (ref 65–99)
GLUCOSE BLD-MCNC: 93 MG/DL (ref 65–99)
GLUCOSE BLD-MCNC: 95 MG/DL (ref 65–99)
GLUCOSE BLD-MCNC: 96 MG/DL (ref 65–99)
GLUCOSE BLD-MCNC: 97 MG/DL (ref 65–99)
GLUCOSE BLD-MCNC: 98 MG/DL (ref 65–99)
GLUCOSE BLD-MCNC: 99 MG/DL (ref 65–99)
GLUCOSE BLDC GLUCOMTR-MCNC: 101 MG/DL (ref 70–130)
GLUCOSE BLDC GLUCOMTR-MCNC: 105 MG/DL (ref 70–130)
GLUCOSE BLDC GLUCOMTR-MCNC: 106 MG/DL (ref 70–130)
GLUCOSE BLDC GLUCOMTR-MCNC: 108 MG/DL (ref 70–130)
GLUCOSE BLDC GLUCOMTR-MCNC: 109 MG/DL (ref 70–130)
GLUCOSE BLDC GLUCOMTR-MCNC: 111 MG/DL (ref 70–130)
GLUCOSE BLDC GLUCOMTR-MCNC: 111 MG/DL (ref 70–130)
GLUCOSE BLDC GLUCOMTR-MCNC: 114 MG/DL (ref 70–130)
GLUCOSE BLDC GLUCOMTR-MCNC: 115 MG/DL (ref 70–130)
GLUCOSE BLDC GLUCOMTR-MCNC: 116 MG/DL (ref 70–130)
GLUCOSE BLDC GLUCOMTR-MCNC: 116 MG/DL (ref 70–130)
GLUCOSE BLDC GLUCOMTR-MCNC: 118 MG/DL (ref 70–130)
GLUCOSE BLDC GLUCOMTR-MCNC: 119 MG/DL (ref 70–130)
GLUCOSE BLDC GLUCOMTR-MCNC: 123 MG/DL (ref 70–130)
GLUCOSE BLDC GLUCOMTR-MCNC: 123 MG/DL (ref 70–130)
GLUCOSE BLDC GLUCOMTR-MCNC: 127 MG/DL (ref 70–130)
GLUCOSE BLDC GLUCOMTR-MCNC: 127 MG/DL (ref 70–130)
GLUCOSE BLDC GLUCOMTR-MCNC: 129 MG/DL (ref 70–130)
GLUCOSE BLDC GLUCOMTR-MCNC: 130 MG/DL (ref 70–130)
GLUCOSE BLDC GLUCOMTR-MCNC: 131 MG/DL (ref 70–130)
GLUCOSE BLDC GLUCOMTR-MCNC: 135 MG/DL (ref 70–130)
GLUCOSE BLDC GLUCOMTR-MCNC: 138 MG/DL (ref 70–130)
GLUCOSE BLDC GLUCOMTR-MCNC: 142 MG/DL (ref 70–130)
GLUCOSE BLDC GLUCOMTR-MCNC: 143 MG/DL (ref 70–130)
GLUCOSE BLDC GLUCOMTR-MCNC: 144 MG/DL (ref 70–130)
GLUCOSE BLDC GLUCOMTR-MCNC: 144 MG/DL (ref 70–130)
GLUCOSE BLDC GLUCOMTR-MCNC: 146 MG/DL (ref 70–130)
GLUCOSE BLDC GLUCOMTR-MCNC: 147 MG/DL (ref 70–130)
GLUCOSE BLDC GLUCOMTR-MCNC: 149 MG/DL (ref 70–130)
GLUCOSE BLDC GLUCOMTR-MCNC: 150 MG/DL (ref 70–130)
GLUCOSE BLDC GLUCOMTR-MCNC: 159 MG/DL (ref 70–130)
GLUCOSE BLDC GLUCOMTR-MCNC: 168 MG/DL (ref 70–130)
GLUCOSE BLDC GLUCOMTR-MCNC: 174 MG/DL (ref 70–130)
GLUCOSE BLDC GLUCOMTR-MCNC: 177 MG/DL (ref 70–130)
GLUCOSE BLDC GLUCOMTR-MCNC: 188 MG/DL (ref 70–130)
GLUCOSE BLDC GLUCOMTR-MCNC: 189 MG/DL (ref 70–130)
GLUCOSE BLDC GLUCOMTR-MCNC: 194 MG/DL (ref 70–130)
GLUCOSE BLDC GLUCOMTR-MCNC: 209 MG/DL (ref 70–130)
GLUCOSE BLDC GLUCOMTR-MCNC: 70 MG/DL (ref 70–130)
GLUCOSE BLDC GLUCOMTR-MCNC: 81 MG/DL (ref 70–130)
GLUCOSE BLDC GLUCOMTR-MCNC: 84 MG/DL (ref 70–130)
GLUCOSE BLDC GLUCOMTR-MCNC: 85 MG/DL (ref 70–130)
GLUCOSE BLDC GLUCOMTR-MCNC: 88 MG/DL (ref 70–130)
GLUCOSE BLDC GLUCOMTR-MCNC: 89 MG/DL (ref 70–130)
GLUCOSE BLDC GLUCOMTR-MCNC: 93 MG/DL (ref 70–130)
GLUCOSE BLDC GLUCOMTR-MCNC: 97 MG/DL (ref 70–130)
GLUCOSE BLDC GLUCOMTR-MCNC: 97 MG/DL (ref 70–130)
GLUCOSE BLDC GLUCOMTR-MCNC: 98 MG/DL (ref 70–130)
GLUCOSE BLDC GLUCOMTR-MCNC: 99 MG/DL (ref 70–130)
GLUCOSE UR STRIP-MCNC: NEGATIVE MG/DL
GLUCOSE UR STRIP-MCNC: NEGATIVE MG/DL
GRAM STN SPEC: NORMAL
GRAM STN SPEC: NORMAL
HADV DNA SPEC NAA+PROBE: NOT DETECTED
HADV DNA SPEC NAA+PROBE: NOT DETECTED
HBA1C MFR BLD: 5.48 % (ref 4.8–5.6)
HCO3 BLDA-SCNC: 35.1 MMOL/L (ref 22–28)
HCO3 BLDA-SCNC: 35.1 MMOL/L (ref 22–28)
HCO3 BLDA-SCNC: 40 MMOL/L (ref 22–28)
HCO3 BLDA-SCNC: 43 MMOL/L (ref 22–28)
HCO3 BLDA-SCNC: 43.6 MMOL/L (ref 22–28)
HCO3 BLDA-SCNC: 43.8 MMOL/L (ref 22–28)
HCO3 BLDA-SCNC: 45.2 MMOL/L (ref 22–28)
HCO3 BLDA-SCNC: 46.5 MMOL/L (ref 22–28)
HCO3 BLDA-SCNC: 49.2 MMOL/L (ref 22–28)
HCO3 BLDA-SCNC: 49.5 MMOL/L (ref 22–28)
HCO3 BLDA-SCNC: 52.5 MMOL/L (ref 22–28)
HCO3 BLDA-SCNC: NORMAL MMOL/L (ref 22–28)
HCOV 229E RNA SPEC QL NAA+PROBE: NOT DETECTED
HCOV 229E RNA SPEC QL NAA+PROBE: NOT DETECTED
HCOV HKU1 RNA SPEC QL NAA+PROBE: NOT DETECTED
HCOV HKU1 RNA SPEC QL NAA+PROBE: NOT DETECTED
HCOV NL63 RNA SPEC QL NAA+PROBE: NOT DETECTED
HCOV NL63 RNA SPEC QL NAA+PROBE: NOT DETECTED
HCOV OC43 RNA SPEC QL NAA+PROBE: NOT DETECTED
HCOV OC43 RNA SPEC QL NAA+PROBE: NOT DETECTED
HCT VFR BLD AUTO: 22.8 % (ref 34–46.6)
HCT VFR BLD AUTO: 26.9 % (ref 34–46.6)
HCT VFR BLD AUTO: 28.9 % (ref 34–46.6)
HCT VFR BLD AUTO: 29.2 % (ref 34–46.6)
HCT VFR BLD AUTO: 31.5 % (ref 34–46.6)
HCT VFR BLD AUTO: 32.4 % (ref 34–46.6)
HCT VFR BLD AUTO: 33 % (ref 34–46.6)
HCT VFR BLD AUTO: 33.2 % (ref 34–46.6)
HCT VFR BLD AUTO: 33.3 % (ref 34–46.6)
HCT VFR BLD AUTO: 33.6 % (ref 34–46.6)
HCT VFR BLD AUTO: 33.8 % (ref 34–46.6)
HCT VFR BLD AUTO: 34.3 % (ref 34–46.6)
HCT VFR BLD AUTO: 35 % (ref 34–46.6)
HCT VFR BLD AUTO: 35.2 % (ref 34–46.6)
HCT VFR BLD AUTO: 35.4 % (ref 34–46.6)
HCT VFR BLD AUTO: 35.7 % (ref 34–46.6)
HCT VFR BLD AUTO: 35.7 % (ref 34–46.6)
HCT VFR BLD AUTO: 35.8 % (ref 34–46.6)
HCT VFR BLD AUTO: 36.1 % (ref 34–46.6)
HCT VFR BLD AUTO: 36.3 % (ref 34–46.6)
HCT VFR BLD AUTO: 36.7 % (ref 34–46.6)
HCT VFR BLD AUTO: 38 % (ref 34–46.6)
HCT VFR BLD AUTO: 39 % (ref 34–46.6)
HCT VFR BLD AUTO: 43.2 % (ref 34–46.6)
HEMOCCULT STL QL: POSITIVE
HGB BLD-MCNC: 10 G/DL (ref 12–15.9)
HGB BLD-MCNC: 10.1 G/DL (ref 12–15.9)
HGB BLD-MCNC: 10.1 G/DL (ref 12–15.9)
HGB BLD-MCNC: 10.2 G/DL (ref 12–15.9)
HGB BLD-MCNC: 10.3 G/DL (ref 12–15.9)
HGB BLD-MCNC: 10.4 G/DL (ref 12–15.9)
HGB BLD-MCNC: 10.5 G/DL (ref 12–15.9)
HGB BLD-MCNC: 10.7 G/DL (ref 12–15.9)
HGB BLD-MCNC: 11 G/DL (ref 12–15.9)
HGB BLD-MCNC: 11.1 G/DL (ref 12–15.9)
HGB BLD-MCNC: 11.2 G/DL (ref 12–15.9)
HGB BLD-MCNC: 11.5 G/DL (ref 12–15.9)
HGB BLD-MCNC: 12.4 G/DL (ref 12–15.9)
HGB BLD-MCNC: 6.5 G/DL (ref 12–15.9)
HGB BLD-MCNC: 7.7 G/DL (ref 12–15.9)
HGB BLD-MCNC: 8.1 G/DL (ref 12–15.9)
HGB BLD-MCNC: 8.5 G/DL (ref 12–15.9)
HGB BLD-MCNC: 9.4 G/DL (ref 12–15.9)
HGB BLD-MCNC: 9.5 G/DL (ref 12–15.9)
HGB BLD-MCNC: 9.9 G/DL (ref 12–15.9)
HGB UR QL STRIP.AUTO: NEGATIVE
HGB UR QL STRIP.AUTO: NEGATIVE
HMPV RNA NPH QL NAA+NON-PROBE: NOT DETECTED
HMPV RNA NPH QL NAA+NON-PROBE: NOT DETECTED
HOROWITZ INDEX BLD+IHG-RTO: 30 %
HOROWITZ INDEX BLD+IHG-RTO: 35 %
HOROWITZ INDEX BLD+IHG-RTO: 40 %
HOROWITZ INDEX BLD+IHG-RTO: 40 %
HOROWITZ INDEX BLD+IHG-RTO: 50 %
HOROWITZ INDEX BLD+IHG-RTO: NORMAL %
HPIV1 RNA SPEC QL NAA+PROBE: NOT DETECTED
HPIV1 RNA SPEC QL NAA+PROBE: NOT DETECTED
HPIV2 RNA SPEC QL NAA+PROBE: NOT DETECTED
HPIV2 RNA SPEC QL NAA+PROBE: NOT DETECTED
HPIV3 RNA NPH QL NAA+PROBE: NOT DETECTED
HPIV3 RNA NPH QL NAA+PROBE: NOT DETECTED
HPIV4 P GENE NPH QL NAA+PROBE: NOT DETECTED
HPIV4 P GENE NPH QL NAA+PROBE: NOT DETECTED
IMM GRANULOCYTES # BLD AUTO: 0.04 10*3/MM3 (ref 0–0.05)
IMM GRANULOCYTES # BLD AUTO: 0.05 10*3/MM3 (ref 0–0.05)
IMM GRANULOCYTES # BLD AUTO: 0.06 10*3/MM3 (ref 0–0.05)
IMM GRANULOCYTES # BLD AUTO: 0.07 10*3/MM3 (ref 0–0.05)
IMM GRANULOCYTES # BLD AUTO: 0.07 10*3/MM3 (ref 0–0.05)
IMM GRANULOCYTES # BLD AUTO: 0.08 10*3/MM3 (ref 0–0.05)
IMM GRANULOCYTES # BLD AUTO: 0.08 10*3/MM3 (ref 0–0.05)
IMM GRANULOCYTES # BLD AUTO: 0.09 10*3/MM3 (ref 0–0.05)
IMM GRANULOCYTES # BLD AUTO: 0.1 10*3/MM3 (ref 0–0.05)
IMM GRANULOCYTES # BLD AUTO: 0.1 10*3/MM3 (ref 0–0.05)
IMM GRANULOCYTES # BLD AUTO: 0.11 10*3/MM3 (ref 0–0.05)
IMM GRANULOCYTES NFR BLD AUTO: 0.5 % (ref 0–0.5)
IMM GRANULOCYTES NFR BLD AUTO: 0.6 % (ref 0–0.5)
IMM GRANULOCYTES NFR BLD AUTO: 0.7 % (ref 0–0.5)
IMM GRANULOCYTES NFR BLD AUTO: 0.8 % (ref 0–0.5)
IMM GRANULOCYTES NFR BLD AUTO: 0.9 % (ref 0–0.5)
IMM GRANULOCYTES NFR BLD AUTO: 0.9 % (ref 0–0.5)
IMM GRANULOCYTES NFR BLD AUTO: 1 % (ref 0–0.5)
IMM GRANULOCYTES NFR BLD AUTO: 1.2 % (ref 0–0.5)
INR PPP: 0.86 (ref 0.9–1.1)
INR PPP: 0.93 (ref 0.9–1.1)
KETONES UR QL STRIP: ABNORMAL
KETONES UR QL STRIP: NEGATIVE
L PNEUMO1 AG UR QL IA: NEGATIVE
LEUKOCYTE ESTERASE UR QL STRIP.AUTO: NEGATIVE
LEUKOCYTE ESTERASE UR QL STRIP.AUTO: NEGATIVE
LYMPHOCYTES # BLD AUTO: 0.59 10*3/MM3 (ref 0.7–3.1)
LYMPHOCYTES # BLD AUTO: 0.74 10*3/MM3 (ref 0.7–3.1)
LYMPHOCYTES # BLD AUTO: 0.79 10*3/MM3 (ref 0.7–3.1)
LYMPHOCYTES # BLD AUTO: 0.86 10*3/MM3 (ref 0.7–3.1)
LYMPHOCYTES # BLD AUTO: 0.88 10*3/MM3 (ref 0.7–3.1)
LYMPHOCYTES # BLD AUTO: 1.02 10*3/MM3 (ref 0.7–3.1)
LYMPHOCYTES # BLD AUTO: 1.25 10*3/MM3 (ref 0.7–3.1)
LYMPHOCYTES # BLD AUTO: 1.28 10*3/MM3 (ref 0.7–3.1)
LYMPHOCYTES # BLD AUTO: 1.31 10*3/MM3 (ref 0.7–3.1)
LYMPHOCYTES # BLD AUTO: 1.43 10*3/MM3 (ref 0.7–3.1)
LYMPHOCYTES # BLD AUTO: 1.53 10*3/MM3 (ref 0.7–3.1)
LYMPHOCYTES # BLD AUTO: 1.55 10*3/MM3 (ref 0.7–3.1)
LYMPHOCYTES # BLD AUTO: 1.68 10*3/MM3 (ref 0.7–3.1)
LYMPHOCYTES # BLD AUTO: 1.71 10*3/MM3 (ref 0.7–3.1)
LYMPHOCYTES # BLD AUTO: 1.78 10*3/MM3 (ref 0.7–3.1)
LYMPHOCYTES # BLD AUTO: 1.79 10*3/MM3 (ref 0.7–3.1)
LYMPHOCYTES # BLD AUTO: 1.87 10*3/MM3 (ref 0.7–3.1)
LYMPHOCYTES # BLD AUTO: 1.9 10*3/MM3 (ref 0.7–3.1)
LYMPHOCYTES # BLD MANUAL: 0.94 10*3/MM3 (ref 0.7–3.1)
LYMPHOCYTES NFR BLD AUTO: 10.5 % (ref 19.6–45.3)
LYMPHOCYTES NFR BLD AUTO: 11.2 % (ref 19.6–45.3)
LYMPHOCYTES NFR BLD AUTO: 11.2 % (ref 19.6–45.3)
LYMPHOCYTES NFR BLD AUTO: 11.7 % (ref 19.6–45.3)
LYMPHOCYTES NFR BLD AUTO: 12.4 % (ref 19.6–45.3)
LYMPHOCYTES NFR BLD AUTO: 13.1 % (ref 19.6–45.3)
LYMPHOCYTES NFR BLD AUTO: 13.2 % (ref 19.6–45.3)
LYMPHOCYTES NFR BLD AUTO: 14.9 % (ref 19.6–45.3)
LYMPHOCYTES NFR BLD AUTO: 15.1 % (ref 19.6–45.3)
LYMPHOCYTES NFR BLD AUTO: 15.8 % (ref 19.6–45.3)
LYMPHOCYTES NFR BLD AUTO: 16.7 % (ref 19.6–45.3)
LYMPHOCYTES NFR BLD AUTO: 17 % (ref 19.6–45.3)
LYMPHOCYTES NFR BLD AUTO: 18.5 % (ref 19.6–45.3)
LYMPHOCYTES NFR BLD AUTO: 19.2 % (ref 19.6–45.3)
LYMPHOCYTES NFR BLD AUTO: 5.6 % (ref 19.6–45.3)
LYMPHOCYTES NFR BLD AUTO: 6 % (ref 19.6–45.3)
LYMPHOCYTES NFR BLD AUTO: 7.7 % (ref 19.6–45.3)
LYMPHOCYTES NFR BLD AUTO: 9.2 % (ref 19.6–45.3)
LYMPHOCYTES NFR BLD MANUAL: 1 % (ref 5–12)
LYMPHOCYTES NFR BLD MANUAL: 6 % (ref 19.6–45.3)
M PNEUMO IGG SER IA-ACNC: NOT DETECTED
M PNEUMO IGG SER IA-ACNC: NOT DETECTED
MACROCYTES BLD QL SMEAR: ABNORMAL
MACROCYTES BLD QL SMEAR: NORMAL
MAGNESIUM SERPL-MCNC: 1.3 MG/DL (ref 1.6–2.4)
MAGNESIUM SERPL-MCNC: 1.8 MG/DL (ref 1.6–2.4)
MAGNESIUM SERPL-MCNC: 2 MG/DL (ref 1.6–2.4)
MCH RBC QN AUTO: 29.5 PG (ref 26.6–33)
MCH RBC QN AUTO: 29.7 PG (ref 26.6–33)
MCH RBC QN AUTO: 29.9 PG (ref 26.6–33)
MCH RBC QN AUTO: 30 PG (ref 26.6–33)
MCH RBC QN AUTO: 30.1 PG (ref 26.6–33)
MCH RBC QN AUTO: 30.3 PG (ref 26.6–33)
MCH RBC QN AUTO: 30.3 PG (ref 26.6–33)
MCH RBC QN AUTO: 30.4 PG (ref 26.6–33)
MCH RBC QN AUTO: 30.5 PG (ref 26.6–33)
MCH RBC QN AUTO: 30.5 PG (ref 26.6–33)
MCH RBC QN AUTO: 30.6 PG (ref 26.6–33)
MCH RBC QN AUTO: 30.7 PG (ref 26.6–33)
MCH RBC QN AUTO: 30.8 PG (ref 26.6–33)
MCH RBC QN AUTO: 30.9 PG (ref 26.6–33)
MCH RBC QN AUTO: 31 PG (ref 26.6–33)
MCH RBC QN AUTO: 31 PG (ref 26.6–33)
MCH RBC QN AUTO: 31.1 PG (ref 26.6–33)
MCHC RBC AUTO-ENTMCNC: 28 G/DL (ref 31.5–35.7)
MCHC RBC AUTO-ENTMCNC: 28.5 G/DL (ref 31.5–35.7)
MCHC RBC AUTO-ENTMCNC: 28.7 G/DL (ref 31.5–35.7)
MCHC RBC AUTO-ENTMCNC: 28.7 G/DL (ref 31.5–35.7)
MCHC RBC AUTO-ENTMCNC: 28.8 G/DL (ref 31.5–35.7)
MCHC RBC AUTO-ENTMCNC: 28.9 G/DL (ref 31.5–35.7)
MCHC RBC AUTO-ENTMCNC: 29.1 G/DL (ref 31.5–35.7)
MCHC RBC AUTO-ENTMCNC: 29.4 G/DL (ref 31.5–35.7)
MCHC RBC AUTO-ENTMCNC: 29.4 G/DL (ref 31.5–35.7)
MCHC RBC AUTO-ENTMCNC: 29.6 G/DL (ref 31.5–35.7)
MCHC RBC AUTO-ENTMCNC: 29.7 G/DL (ref 31.5–35.7)
MCHC RBC AUTO-ENTMCNC: 29.7 G/DL (ref 31.5–35.7)
MCHC RBC AUTO-ENTMCNC: 29.8 G/DL (ref 31.5–35.7)
MCHC RBC AUTO-ENTMCNC: 29.9 G/DL (ref 31.5–35.7)
MCHC RBC AUTO-ENTMCNC: 30 G/DL (ref 31.5–35.7)
MCHC RBC AUTO-ENTMCNC: 30.3 G/DL (ref 31.5–35.7)
MCHC RBC AUTO-ENTMCNC: 30.4 G/DL (ref 31.5–35.7)
MCHC RBC AUTO-ENTMCNC: 30.6 G/DL (ref 31.5–35.7)
MCHC RBC AUTO-ENTMCNC: 31.1 G/DL (ref 31.5–35.7)
MCV RBC AUTO: 100 FL (ref 79–97)
MCV RBC AUTO: 100.3 FL (ref 79–97)
MCV RBC AUTO: 101.1 FL (ref 79–97)
MCV RBC AUTO: 101.4 FL (ref 79–97)
MCV RBC AUTO: 101.4 FL (ref 79–97)
MCV RBC AUTO: 101.6 FL (ref 79–97)
MCV RBC AUTO: 101.7 FL (ref 79–97)
MCV RBC AUTO: 101.8 FL (ref 79–97)
MCV RBC AUTO: 102.9 FL (ref 79–97)
MCV RBC AUTO: 103.1 FL (ref 79–97)
MCV RBC AUTO: 103.6 FL (ref 79–97)
MCV RBC AUTO: 103.7 FL (ref 79–97)
MCV RBC AUTO: 103.9 FL (ref 79–97)
MCV RBC AUTO: 104.1 FL (ref 79–97)
MCV RBC AUTO: 106.9 FL (ref 79–97)
MCV RBC AUTO: 107.4 FL (ref 79–97)
MCV RBC AUTO: 107.7 FL (ref 79–97)
MCV RBC AUTO: 108.6 FL (ref 79–97)
MCV RBC AUTO: 109.2 FL (ref 79–97)
MCV RBC AUTO: 97.9 FL (ref 79–97)
MCV RBC AUTO: 98.1 FL (ref 79–97)
MODALITY: ABNORMAL
MODALITY: NORMAL
MONOCYTES # BLD AUTO: 0.16 10*3/MM3 (ref 0.1–0.9)
MONOCYTES # BLD AUTO: 0.19 10*3/MM3 (ref 0.1–0.9)
MONOCYTES # BLD AUTO: 0.29 10*3/MM3 (ref 0.1–0.9)
MONOCYTES # BLD AUTO: 0.31 10*3/MM3 (ref 0.1–0.9)
MONOCYTES # BLD AUTO: 0.42 10*3/MM3 (ref 0.1–0.9)
MONOCYTES # BLD AUTO: 0.57 10*3/MM3 (ref 0.1–0.9)
MONOCYTES # BLD AUTO: 0.61 10*3/MM3 (ref 0.1–0.9)
MONOCYTES # BLD AUTO: 0.74 10*3/MM3 (ref 0.1–0.9)
MONOCYTES # BLD AUTO: 0.74 10*3/MM3 (ref 0.1–0.9)
MONOCYTES # BLD AUTO: 0.81 10*3/MM3 (ref 0.1–0.9)
MONOCYTES # BLD AUTO: 0.81 10*3/MM3 (ref 0.1–0.9)
MONOCYTES # BLD AUTO: 0.89 10*3/MM3 (ref 0.1–0.9)
MONOCYTES # BLD AUTO: 0.91 10*3/MM3 (ref 0.1–0.9)
MONOCYTES # BLD AUTO: 0.91 10*3/MM3 (ref 0.1–0.9)
MONOCYTES # BLD AUTO: 0.92 10*3/MM3 (ref 0.1–0.9)
MONOCYTES # BLD AUTO: 0.94 10*3/MM3 (ref 0.1–0.9)
MONOCYTES # BLD AUTO: 0.96 10*3/MM3 (ref 0.1–0.9)
MONOCYTES # BLD AUTO: 0.98 10*3/MM3 (ref 0.1–0.9)
MONOCYTES # BLD AUTO: 0.99 10*3/MM3 (ref 0.1–0.9)
MONOCYTES NFR BLD AUTO: 1.5 % (ref 5–12)
MONOCYTES NFR BLD AUTO: 2.9 % (ref 5–12)
MONOCYTES NFR BLD AUTO: 3.8 % (ref 5–12)
MONOCYTES NFR BLD AUTO: 4.1 % (ref 5–12)
MONOCYTES NFR BLD AUTO: 5.2 % (ref 5–12)
MONOCYTES NFR BLD AUTO: 7.2 % (ref 5–12)
MONOCYTES NFR BLD AUTO: 7.3 % (ref 5–12)
MONOCYTES NFR BLD AUTO: 7.8 % (ref 5–12)
MONOCYTES NFR BLD AUTO: 7.9 % (ref 5–12)
MONOCYTES NFR BLD AUTO: 7.9 % (ref 5–12)
MONOCYTES NFR BLD AUTO: 8 % (ref 5–12)
MONOCYTES NFR BLD AUTO: 8.3 % (ref 5–12)
MONOCYTES NFR BLD AUTO: 8.3 % (ref 5–12)
MONOCYTES NFR BLD AUTO: 8.4 % (ref 5–12)
MONOCYTES NFR BLD AUTO: 8.4 % (ref 5–12)
MONOCYTES NFR BLD AUTO: 8.9 % (ref 5–12)
MONOCYTES NFR BLD AUTO: 8.9 % (ref 5–12)
MONOCYTES NFR BLD AUTO: 9.3 % (ref 5–12)
MRSA SPEC QL CULT: NORMAL
MYELOCYTES NFR BLD MANUAL: 1 % (ref 0–0)
NEUTROPHILS # BLD AUTO: 12.05 10*3/MM3 (ref 1.7–7)
NEUTROPHILS # BLD AUTO: 13.22 10*3/MM3 (ref 1.7–7)
NEUTROPHILS # BLD AUTO: 14.46 10*3/MM3 (ref 1.4–7)
NEUTROPHILS # BLD AUTO: 6.11 10*3/MM3 (ref 1.7–7)
NEUTROPHILS # BLD AUTO: 6.36 10*3/MM3 (ref 1.7–7)
NEUTROPHILS # BLD AUTO: 6.39 10*3/MM3 (ref 1.7–7)
NEUTROPHILS # BLD AUTO: 6.75 10*3/MM3 (ref 1.7–7)
NEUTROPHILS # BLD AUTO: 7.17 10*3/MM3 (ref 1.7–7)
NEUTROPHILS # BLD AUTO: 7.32 10*3/MM3 (ref 1.7–7)
NEUTROPHILS # BLD AUTO: 7.67 10*3/MM3 (ref 1.7–7)
NEUTROPHILS # BLD AUTO: 7.73 10*3/MM3 (ref 1.7–7)
NEUTROPHILS # BLD AUTO: 7.9 10*3/MM3 (ref 1.7–7)
NEUTROPHILS # BLD AUTO: 8.59 10*3/MM3 (ref 1.4–7)
NEUTROPHILS # BLD AUTO: 8.69 10*3/MM3 (ref 1.7–7)
NEUTROPHILS # BLD AUTO: 8.89 10*3/MM3 (ref 1.4–7)
NEUTROPHILS # BLD AUTO: 8.98 10*3/MM3 (ref 1.4–7)
NEUTROPHILS # BLD AUTO: 9.01 10*3/MM3 (ref 1.7–7)
NEUTROPHILS # BLD AUTO: 9.38 10*3/MM3 (ref 1.7–7)
NEUTROPHILS # BLD AUTO: 9.66 10*3/MM3 (ref 1.7–7)
NEUTROPHILS NFR BLD AUTO: 71.2 % (ref 42.7–76)
NEUTROPHILS NFR BLD AUTO: 71.5 % (ref 42.7–76)
NEUTROPHILS NFR BLD AUTO: 73.4 % (ref 42.7–76)
NEUTROPHILS NFR BLD AUTO: 73.8 % (ref 42.7–76)
NEUTROPHILS NFR BLD AUTO: 74 % (ref 42.7–76)
NEUTROPHILS NFR BLD AUTO: 75.8 % (ref 42.7–76)
NEUTROPHILS NFR BLD AUTO: 76.8 % (ref 42.7–76)
NEUTROPHILS NFR BLD AUTO: 77.6 % (ref 42.7–76)
NEUTROPHILS NFR BLD AUTO: 78.1 % (ref 42.7–76)
NEUTROPHILS NFR BLD AUTO: 78.1 % (ref 42.7–76)
NEUTROPHILS NFR BLD AUTO: 78.2 % (ref 42.7–76)
NEUTROPHILS NFR BLD AUTO: 79.3 % (ref 42.7–76)
NEUTROPHILS NFR BLD AUTO: 81.8 % (ref 42.7–76)
NEUTROPHILS NFR BLD AUTO: 82.8 % (ref 42.7–76)
NEUTROPHILS NFR BLD AUTO: 84.7 % (ref 42.7–76)
NEUTROPHILS NFR BLD AUTO: 87.9 % (ref 42.7–76)
NEUTROPHILS NFR BLD AUTO: 90.1 % (ref 42.7–76)
NEUTROPHILS NFR BLD AUTO: 91.9 % (ref 42.7–76)
NEUTROPHILS NFR BLD MANUAL: 92 % (ref 42.7–76)
NITRITE UR QL STRIP: NEGATIVE
NITRITE UR QL STRIP: NEGATIVE
NRBC BLD AUTO-RTO: 0 /100 WBC (ref 0–0)
NRBC BLD AUTO-RTO: 0 /100 WBC (ref 0–0.2)
NRBC BLD AUTO-RTO: 0.1 /100 WBC (ref 0–0)
NRBC BLD AUTO-RTO: 0.1 /100 WBC (ref 0–0)
NRBC BLD AUTO-RTO: 0.1 /100 WBC (ref 0–0.2)
NRBC BLD AUTO-RTO: 0.1 /100 WBC (ref 0–0.2)
NT-PROBNP SERPL-MCNC: 1466 PG/ML (ref 5–1800)
O2 A-A PPRESDIFF RESPIRATORY: 0.4 MMHG
O2 A-A PPRESDIFF RESPIRATORY: 0.5 MMHG
O2 A-A PPRESDIFF RESPIRATORY: 0.5 MMHG
O2 A-A PPRESDIFF RESPIRATORY: 0.6 MMHG
O2 A-A PPRESDIFF RESPIRATORY: 0.6 MMHG
O2 A-A PPRESDIFF RESPIRATORY: 1 MMHG
O2 A-A PPRESDIFF RESPIRATORY: NORMAL MMHG
PCO2 BLDA: 109.6 MM HG (ref 35–45)
PCO2 BLDA: 53.1 MM HG (ref 35–45)
PCO2 BLDA: 55.4 MM HG (ref 35–45)
PCO2 BLDA: 57.6 MM HG (ref 35–45)
PCO2 BLDA: 59.7 MM HG (ref 35–45)
PCO2 BLDA: 60.2 MM HG (ref 35–45)
PCO2 BLDA: 60.4 MM HG (ref 35–45)
PCO2 BLDA: 85.7 MM HG (ref 35–45)
PCO2 BLDA: 87 MM HG (ref 35–45)
PCO2 BLDA: 90.5 MM HG (ref 35–45)
PCO2 BLDA: 92 MM HG (ref 35–45)
PCO2 BLDA: NORMAL MM HG (ref 35–45)
PEEP RESPIRATORY: 5 CM[H2O]
PEEP RESPIRATORY: NORMAL CM[H2O]
PH BLDA: 7.29 PH UNITS (ref 7.35–7.45)
PH BLDA: 7.31 PH UNITS (ref 7.35–7.45)
PH BLDA: 7.32 PH UNITS (ref 7.35–7.45)
PH BLDA: 7.39 PH UNITS (ref 7.35–7.45)
PH BLDA: 7.41 PH UNITS (ref 7.35–7.45)
PH BLDA: 7.43 PH UNITS (ref 7.35–7.45)
PH BLDA: 7.49 PH UNITS (ref 7.35–7.45)
PH BLDA: 7.52 PH UNITS (ref 7.35–7.45)
PH BLDA: 7.58 PH UNITS (ref 7.35–7.45)
PH BLDA: NORMAL PH UNITS (ref 7.35–7.45)
PH UR STRIP.AUTO: 6.5 [PH] (ref 5–8)
PH UR STRIP.AUTO: 7 [PH] (ref 5–8)
PLAT MORPH BLD: NORMAL
PLATELET # BLD AUTO: 188 10*3/MM3 (ref 140–450)
PLATELET # BLD AUTO: 191 10*3/MM3 (ref 140–450)
PLATELET # BLD AUTO: 197 10*3/MM3 (ref 140–450)
PLATELET # BLD AUTO: 205 10*3/MM3 (ref 140–450)
PLATELET # BLD AUTO: 224 10*3/MM3 (ref 140–450)
PLATELET # BLD AUTO: 228 10*3/MM3 (ref 140–450)
PLATELET # BLD AUTO: 231 10*3/MM3 (ref 140–450)
PLATELET # BLD AUTO: 238 10*3/MM3 (ref 140–450)
PLATELET # BLD AUTO: 248 10*3/MM3 (ref 140–450)
PLATELET # BLD AUTO: 249 10*3/MM3 (ref 140–450)
PLATELET # BLD AUTO: 252 10*3/MM3 (ref 140–450)
PLATELET # BLD AUTO: 255 10*3/MM3 (ref 140–450)
PLATELET # BLD AUTO: 268 10*3/MM3 (ref 140–450)
PLATELET # BLD AUTO: 273 10*3/MM3 (ref 140–450)
PLATELET # BLD AUTO: 299 10*3/MM3 (ref 140–450)
PLATELET # BLD AUTO: 315 10*3/MM3 (ref 140–450)
PLATELET # BLD AUTO: 326 10*3/MM3 (ref 140–450)
PLATELET # BLD AUTO: 326 10*3/MM3 (ref 140–450)
PLATELET # BLD AUTO: 349 10*3/MM3 (ref 140–450)
PLATELET # BLD AUTO: 354 10*3/MM3 (ref 140–450)
PLATELET # BLD AUTO: 398 10*3/MM3 (ref 140–450)
PMV BLD AUTO: 10.8 FL (ref 6–12)
PMV BLD AUTO: 11 FL (ref 6–12)
PMV BLD AUTO: 11.1 FL (ref 6–12)
PMV BLD AUTO: 11.2 FL (ref 6–12)
PMV BLD AUTO: 11.3 FL (ref 6–12)
PMV BLD AUTO: 11.4 FL (ref 6–12)
PMV BLD AUTO: 11.4 FL (ref 6–12)
PMV BLD AUTO: 11.5 FL (ref 6–12)
PMV BLD AUTO: 11.6 FL (ref 6–12)
PMV BLD AUTO: 11.7 FL (ref 6–12)
PMV BLD AUTO: 11.7 FL (ref 6–12)
PMV BLD AUTO: 11.8 FL (ref 6–12)
PMV BLD AUTO: 12 FL (ref 6–12)
PO2 BLDA: 103.9 MM HG (ref 80–100)
PO2 BLDA: 115.5 MM HG (ref 80–100)
PO2 BLDA: 157.3 MM HG (ref 80–100)
PO2 BLDA: 166.2 MM HG (ref 80–100)
PO2 BLDA: 63 MM HG (ref 80–100)
PO2 BLDA: 67.5 MM HG (ref 80–100)
PO2 BLDA: 74 MM HG (ref 80–100)
PO2 BLDA: 75.4 MM HG (ref 80–100)
PO2 BLDA: 76.2 MM HG (ref 80–100)
PO2 BLDA: 84.5 MM HG (ref 80–100)
PO2 BLDA: 85.3 MM HG (ref 80–100)
PO2 BLDA: NORMAL MM HG (ref 80–100)
POTASSIUM BLD-SCNC: 2.8 MMOL/L (ref 3.5–5.2)
POTASSIUM BLD-SCNC: 2.9 MMOL/L (ref 3.5–5.2)
POTASSIUM BLD-SCNC: 3 MMOL/L (ref 3.5–5.2)
POTASSIUM BLD-SCNC: 3.2 MMOL/L (ref 3.5–5.2)
POTASSIUM BLD-SCNC: 3.3 MMOL/L (ref 3.5–5.2)
POTASSIUM BLD-SCNC: 3.4 MMOL/L (ref 3.5–5.2)
POTASSIUM BLD-SCNC: 3.4 MMOL/L (ref 3.5–5.2)
POTASSIUM BLD-SCNC: 3.6 MMOL/L (ref 3.5–5.2)
POTASSIUM BLD-SCNC: 3.7 MMOL/L (ref 3.5–5.2)
POTASSIUM BLD-SCNC: 3.7 MMOL/L (ref 3.5–5.2)
POTASSIUM BLD-SCNC: 3.8 MMOL/L (ref 3.5–5.2)
POTASSIUM BLD-SCNC: 3.9 MMOL/L (ref 3.5–5.2)
POTASSIUM BLD-SCNC: 4 MMOL/L (ref 3.5–5.2)
POTASSIUM BLD-SCNC: 4.1 MMOL/L (ref 3.5–5.2)
POTASSIUM BLD-SCNC: 4.1 MMOL/L (ref 3.5–5.2)
POTASSIUM BLD-SCNC: 4.3 MMOL/L (ref 3.5–5.2)
POTASSIUM BLD-SCNC: 4.3 MMOL/L (ref 3.5–5.2)
POTASSIUM BLD-SCNC: 4.4 MMOL/L (ref 3.5–5.2)
POTASSIUM BLD-SCNC: 4.5 MMOL/L (ref 3.5–5.2)
POTASSIUM BLD-SCNC: 4.6 MMOL/L (ref 3.5–5.2)
POTASSIUM BLD-SCNC: 4.6 MMOL/L (ref 3.5–5.2)
POTASSIUM BLD-SCNC: 4.7 MMOL/L (ref 3.5–5.2)
POTASSIUM BLD-SCNC: 4.7 MMOL/L (ref 3.5–5.2)
PROCALCITONIN SERPL-MCNC: 0.04 NG/ML (ref 0.1–0.25)
PROCALCITONIN SERPL-MCNC: <0.02 NG/ML (ref 0.1–0.25)
PROCALCITONIN SERPL-MCNC: <0.02 NG/ML (ref 0.1–0.25)
PROT SERPL-MCNC: 5.1 G/DL (ref 6–8.5)
PROT SERPL-MCNC: 5.6 G/DL (ref 6–8.5)
PROT SERPL-MCNC: 6.4 G/DL (ref 6–8.5)
PROT SERPL-MCNC: 6.5 G/DL (ref 6–8.5)
PROT UR QL STRIP: NEGATIVE
PROT UR QL STRIP: NEGATIVE
PROTHROMBIN TIME: 11.5 SECONDS (ref 11.7–14.2)
PROTHROMBIN TIME: 12.2 SECONDS (ref 11.7–14.2)
PSV: 10 CMH2O
RBC # BLD AUTO: 2.1 10*6/MM3 (ref 3.77–5.28)
RBC # BLD AUTO: 3.1 10*6/MM3 (ref 3.77–5.28)
RBC # BLD AUTO: 3.17 10*6/MM3 (ref 3.77–5.28)
RBC # BLD AUTO: 3.21 10*6/MM3 (ref 3.77–5.28)
RBC # BLD AUTO: 3.22 10*6/MM3 (ref 3.77–5.28)
RBC # BLD AUTO: 3.27 10*6/MM3 (ref 3.77–5.28)
RBC # BLD AUTO: 3.3 10*6/MM3 (ref 3.77–5.28)
RBC # BLD AUTO: 3.31 10*6/MM3 (ref 3.77–5.28)
RBC # BLD AUTO: 3.32 10*6/MM3 (ref 3.77–5.28)
RBC # BLD AUTO: 3.35 10*6/MM3 (ref 3.77–5.28)
RBC # BLD AUTO: 3.36 10*6/MM3 (ref 3.77–5.28)
RBC # BLD AUTO: 3.38 10*6/MM3 (ref 3.77–5.28)
RBC # BLD AUTO: 3.44 10*6/MM3 (ref 3.77–5.28)
RBC # BLD AUTO: 3.46 10*6/MM3 (ref 3.77–5.28)
RBC # BLD AUTO: 3.49 10*6/MM3 (ref 3.77–5.28)
RBC # BLD AUTO: 3.56 10*6/MM3 (ref 3.77–5.28)
RBC # BLD AUTO: 3.62 10*6/MM3 (ref 3.77–5.28)
RBC # BLD AUTO: 3.62 10*6/MM3 (ref 3.77–5.28)
RBC # BLD AUTO: 3.64 10*6/MM3 (ref 3.77–5.28)
RBC # BLD AUTO: 3.76 10*6/MM3 (ref 3.77–5.28)
RBC # BLD AUTO: 4.17 10*6/MM3 (ref 3.77–5.28)
RH BLD: POSITIVE
RHINOVIRUS RNA SPEC NAA+PROBE: NOT DETECTED
RHINOVIRUS RNA SPEC NAA+PROBE: NOT DETECTED
RSV RNA NPH QL NAA+NON-PROBE: NOT DETECTED
RSV RNA NPH QL NAA+NON-PROBE: NOT DETECTED
S PNEUM AG SPEC QL LA: NEGATIVE
SAO2 % BLDCOA: 90.7 % (ref 92–99)
SAO2 % BLDCOA: 90.8 % (ref 92–99)
SAO2 % BLDCOA: 92.3 % (ref 92–99)
SAO2 % BLDCOA: 94 % (ref 92–99)
SAO2 % BLDCOA: 94.2 % (ref 92–99)
SAO2 % BLDCOA: 95.6 % (ref 92–99)
SAO2 % BLDCOA: 96.6 % (ref 92–99)
SAO2 % BLDCOA: 97.5 % (ref 92–99)
SAO2 % BLDCOA: 97.7 % (ref 92–99)
SAO2 % BLDCOA: 99.4 % (ref 92–99)
SAO2 % BLDCOA: 99.5 % (ref 92–99)
SAO2 % BLDCOA: NORMAL % (ref 92–99)
SET MECH RESP RATE: 10
SET MECH RESP RATE: 1011
SET MECH RESP RATE: 14
SET MECH RESP RATE: 18
SET MECH RESP RATE: 20
SET MECH RESP RATE: 20
SET MECH RESP RATE: 24
SET MECH RESP RATE: 24
SET MECH RESP RATE: 26
SET MECH RESP RATE: NORMAL
SODIUM BLD-SCNC: 130 MMOL/L (ref 136–145)
SODIUM BLD-SCNC: 134 MMOL/L (ref 136–145)
SODIUM BLD-SCNC: 135 MMOL/L (ref 136–145)
SODIUM BLD-SCNC: 135 MMOL/L (ref 136–145)
SODIUM BLD-SCNC: 136 MMOL/L (ref 136–145)
SODIUM BLD-SCNC: 137 MMOL/L (ref 136–145)
SODIUM BLD-SCNC: 138 MMOL/L (ref 136–145)
SODIUM BLD-SCNC: 138 MMOL/L (ref 136–145)
SODIUM BLD-SCNC: 139 MMOL/L (ref 136–145)
SODIUM BLD-SCNC: 141 MMOL/L (ref 136–145)
SODIUM BLD-SCNC: 142 MMOL/L (ref 136–145)
SODIUM BLD-SCNC: 142 MMOL/L (ref 136–145)
SODIUM BLD-SCNC: 143 MMOL/L (ref 136–145)
SODIUM BLD-SCNC: 143 MMOL/L (ref 136–145)
SODIUM BLD-SCNC: 144 MMOL/L (ref 136–145)
SP GR UR STRIP: 1.01 (ref 1–1.03)
SP GR UR STRIP: 1.01 (ref 1–1.03)
T&S EXPIRATION DATE: NORMAL
TOTAL RATE: 10 BREATHS/MINUTE
TOTAL RATE: 11 BREATHS/MINUTE
TOTAL RATE: 14 BREATHS/MINUTE
TOTAL RATE: 20 BREATHS/MINUTE
TOTAL RATE: 26 BREATHS/MINUTE
TOTAL RATE: 29 BREATHS/MINUTE
TOTAL RATE: NORMAL BREATHS/MINUTE
TRIGL SERPL-MCNC: 265 MG/DL (ref 0–150)
TROPONIN T SERPL-MCNC: 0.02 NG/ML (ref 0–0.03)
TROPONIN T SERPL-MCNC: <0.01 NG/ML (ref 0–0.03)
TSH SERPL DL<=0.05 MIU/L-ACNC: 0.21 MIU/ML (ref 0.27–4.2)
UNIT  ABO: NORMAL
UNIT  RH: NORMAL
UPPER ARTERIAL LEFT ARM BRACHIAL SYS MAX: 158 MMHG
UROBILINOGEN UR QL STRIP: ABNORMAL
UROBILINOGEN UR QL STRIP: NORMAL
VANCOMYCIN SERPL-MCNC: 8 MCG/ML (ref 5–40)
VENTILATOR MODE: ABNORMAL
VENTILATOR MODE: AC
VENTILATOR MODE: NORMAL
VIT B12 BLD-MCNC: 984 PG/ML (ref 211–946)
VT ON VENT VENT: 307 ML
VT ON VENT VENT: 400 ML
VT ON VENT VENT: 468 ML
VT ON VENT VENT: 500 ML
VT ON VENT VENT: NORMAL ML
WBC MORPH BLD: NORMAL
WBC MORPH BLD: NORMAL
WBC NRBC COR # BLD: 10.27 10*3/MM3 (ref 3.4–10.8)
WBC NRBC COR # BLD: 10.44 10*3/MM3 (ref 3.4–10.8)
WBC NRBC COR # BLD: 10.71 10*3/MM3 (ref 3.4–10.8)
WBC NRBC COR # BLD: 10.98 10*3/MM3 (ref 3.4–10.8)
WBC NRBC COR # BLD: 11.2 10*3/MM3 (ref 3.4–10.8)
WBC NRBC COR # BLD: 11.3 10*3/MM3 (ref 3.4–10.8)
WBC NRBC COR # BLD: 11.49 10*3/MM3 (ref 3.4–10.8)
WBC NRBC COR # BLD: 11.6 10*3/MM3 (ref 3.4–10.8)
WBC NRBC COR # BLD: 11.67 10*3/MM3 (ref 3.4–10.8)
WBC NRBC COR # BLD: 12.38 10*3/MM3 (ref 3.4–10.8)
WBC NRBC COR # BLD: 13.1 10*3/MM3 (ref 3.4–10.8)
WBC NRBC COR # BLD: 14.44 10*3/MM3 (ref 3.4–10.8)
WBC NRBC COR # BLD: 14.53 10*3/MM3 (ref 3.4–10.8)
WBC NRBC COR # BLD: 14.66 10*3/MM3 (ref 3.4–10.8)
WBC NRBC COR # BLD: 15.72 10*3/MM3 (ref 3.4–10.8)
WBC NRBC COR # BLD: 7.54 10*3/MM3 (ref 3.4–10.8)
WBC NRBC COR # BLD: 7.68 10*3/MM3 (ref 3.4–10.8)
WBC NRBC COR # BLD: 7.81 10*3/MM3 (ref 3.4–10.8)
WBC NRBC COR # BLD: 8.9 10*3/MM3 (ref 3.4–10.8)
WBC NRBC COR # BLD: 9.37 10*3/MM3 (ref 3.4–10.8)
WBC NRBC COR # BLD: 9.78 10*3/MM3 (ref 3.4–10.8)

## 2019-01-01 PROCEDURE — 94799 UNLISTED PULMONARY SVC/PX: CPT

## 2019-01-01 PROCEDURE — 0BC78ZZ EXTIRPATION OF MATTER FROM LEFT MAIN BRONCHUS, VIA NATURAL OR ARTIFICIAL OPENING ENDOSCOPIC: ICD-10-PCS | Performed by: INTERNAL MEDICINE

## 2019-01-01 PROCEDURE — 94640 AIRWAY INHALATION TREATMENT: CPT

## 2019-01-01 PROCEDURE — 82962 GLUCOSE BLOOD TEST: CPT

## 2019-01-01 PROCEDURE — 25010000002 AZITHROMYCIN PER 500 MG: Performed by: INTERNAL MEDICINE

## 2019-01-01 PROCEDURE — 25010000002 ENOXAPARIN PER 10 MG: Performed by: INTERNAL MEDICINE

## 2019-01-01 PROCEDURE — 85025 COMPLETE CBC W/AUTO DIFF WBC: CPT | Performed by: ORTHOPAEDIC SURGERY

## 2019-01-01 PROCEDURE — 25010000002 CEFEPIME PER 500 MG: Performed by: INTERNAL MEDICINE

## 2019-01-01 PROCEDURE — 0SRS03A REPLACEMENT OF LEFT HIP JOINT, FEMORAL SURFACE WITH CERAMIC SYNTHETIC SUBSTITUTE, UNCEMENTED, OPEN APPROACH: ICD-10-PCS | Performed by: ORTHOPAEDIC SURGERY

## 2019-01-01 PROCEDURE — 36430 TRANSFUSION BLD/BLD COMPNT: CPT

## 2019-01-01 PROCEDURE — 97110 THERAPEUTIC EXERCISES: CPT

## 2019-01-01 PROCEDURE — 25010000002 PROPOFOL 1000 MG/ML EMULSION: Performed by: INTERNAL MEDICINE

## 2019-01-01 PROCEDURE — 25010000003 POTASSIUM CHLORIDE 10 MEQ/100ML SOLUTION: Performed by: INTERNAL MEDICINE

## 2019-01-01 PROCEDURE — 85730 THROMBOPLASTIN TIME PARTIAL: CPT | Performed by: ORTHOPAEDIC SURGERY

## 2019-01-01 PROCEDURE — 25010000002 PROPOFOL 10 MG/ML EMULSION: Performed by: ANESTHESIOLOGY

## 2019-01-01 PROCEDURE — 94660 CPAP INITIATION&MGMT: CPT

## 2019-01-01 PROCEDURE — 84132 ASSAY OF SERUM POTASSIUM: CPT | Performed by: INTERNAL MEDICINE

## 2019-01-01 PROCEDURE — 25010000002 ONDANSETRON PER 1 MG: Performed by: INTERNAL MEDICINE

## 2019-01-01 PROCEDURE — 36600 WITHDRAWAL OF ARTERIAL BLOOD: CPT

## 2019-01-01 PROCEDURE — 86900 BLOOD TYPING SEROLOGIC ABO: CPT

## 2019-01-01 PROCEDURE — 85610 PROTHROMBIN TIME: CPT | Performed by: EMERGENCY MEDICINE

## 2019-01-01 PROCEDURE — 83880 ASSAY OF NATRIURETIC PEPTIDE: CPT | Performed by: EMERGENCY MEDICINE

## 2019-01-01 PROCEDURE — 63710000001 PREDNISONE PER 5 MG: Performed by: INTERNAL MEDICINE

## 2019-01-01 PROCEDURE — 87493 C DIFF AMPLIFIED PROBE: CPT | Performed by: ORTHOPAEDIC SURGERY

## 2019-01-01 PROCEDURE — 94668 MNPJ CHEST WALL SBSQ: CPT

## 2019-01-01 PROCEDURE — 25010000002 FENTANYL CITRATE (PF) 100 MCG/2ML SOLUTION: Performed by: ANESTHESIOLOGY

## 2019-01-01 PROCEDURE — 25010000002 MAGNESIUM SULFATE IN D5W 1G/100ML (PREMIX) 1-5 GM/100ML-% SOLUTION: Performed by: INTERNAL MEDICINE

## 2019-01-01 PROCEDURE — 25010000002 METHYLPREDNISOLONE PER 40 MG: Performed by: INTERNAL MEDICINE

## 2019-01-01 PROCEDURE — 5A1945Z RESPIRATORY VENTILATION, 24-96 CONSECUTIVE HOURS: ICD-10-PCS | Performed by: INTERNAL MEDICINE

## 2019-01-01 PROCEDURE — 93922 UPR/L XTREMITY ART 2 LEVELS: CPT

## 2019-01-01 PROCEDURE — 63710000001 PREDNISONE PER 1 MG: Performed by: INTERNAL MEDICINE

## 2019-01-01 PROCEDURE — 82803 BLOOD GASES ANY COMBINATION: CPT

## 2019-01-01 PROCEDURE — 85027 COMPLETE CBC AUTOMATED: CPT | Performed by: INTERNAL MEDICINE

## 2019-01-01 PROCEDURE — 83735 ASSAY OF MAGNESIUM: CPT | Performed by: INTERNAL MEDICINE

## 2019-01-01 PROCEDURE — 25010000002 HYDROMORPHONE PER 4 MG: Performed by: NURSE ANESTHETIST, CERTIFIED REGISTERED

## 2019-01-01 PROCEDURE — 94002 VENT MGMT INPAT INIT DAY: CPT

## 2019-01-01 PROCEDURE — 87040 BLOOD CULTURE FOR BACTERIA: CPT | Performed by: INTERNAL MEDICINE

## 2019-01-01 PROCEDURE — 85007 BL SMEAR W/DIFF WBC COUNT: CPT | Performed by: ORTHOPAEDIC SURGERY

## 2019-01-01 PROCEDURE — 72192 CT PELVIS W/O DYE: CPT

## 2019-01-01 PROCEDURE — 63710000001 PREDNISONE PER 1 MG: Performed by: ORTHOPAEDIC SURGERY

## 2019-01-01 PROCEDURE — 94664 DEMO&/EVAL PT USE INHALER: CPT

## 2019-01-01 PROCEDURE — 87798 DETECT AGENT NOS DNA AMP: CPT | Performed by: INTERNAL MEDICINE

## 2019-01-01 PROCEDURE — 97150 GROUP THERAPEUTIC PROCEDURES: CPT

## 2019-01-01 PROCEDURE — 25010000002 MORPHINE PER 10 MG: Performed by: INTERNAL MEDICINE

## 2019-01-01 PROCEDURE — 84145 PROCALCITONIN (PCT): CPT | Performed by: INTERNAL MEDICINE

## 2019-01-01 PROCEDURE — 85014 HEMATOCRIT: CPT | Performed by: ORTHOPAEDIC SURGERY

## 2019-01-01 PROCEDURE — 0BD78ZX EXTRACTION OF LEFT MAIN BRONCHUS, VIA NATURAL OR ARTIFICIAL OPENING ENDOSCOPIC, DIAGNOSTIC: ICD-10-PCS | Performed by: INTERNAL MEDICINE

## 2019-01-01 PROCEDURE — 25010000002 HYDROCORTISONE SODIUM SUCCINATE 100 MG RECONSTITUTED SOLUTION: Performed by: ANESTHESIOLOGY

## 2019-01-01 PROCEDURE — 25010000002 KETOROLAC TROMETHAMINE PER 15 MG: Performed by: ORTHOPAEDIC SURGERY

## 2019-01-01 PROCEDURE — 86901 BLOOD TYPING SEROLOGIC RH(D): CPT | Performed by: ORTHOPAEDIC SURGERY

## 2019-01-01 PROCEDURE — 73501 X-RAY EXAM HIP UNI 1 VIEW: CPT

## 2019-01-01 PROCEDURE — 25010000002 PROPOFOL 10 MG/ML EMULSION

## 2019-01-01 PROCEDURE — 94003 VENT MGMT INPAT SUBQ DAY: CPT

## 2019-01-01 PROCEDURE — 25010000002 FENTANYL CITRATE (PF) 100 MCG/2ML SOLUTION

## 2019-01-01 PROCEDURE — 85018 HEMOGLOBIN: CPT | Performed by: ORTHOPAEDIC SURGERY

## 2019-01-01 PROCEDURE — 86850 RBC ANTIBODY SCREEN: CPT | Performed by: NURSE PRACTITIONER

## 2019-01-01 PROCEDURE — 63710000001 INSULIN LISPRO (HUMAN) PER 5 UNITS: Performed by: INTERNAL MEDICINE

## 2019-01-01 PROCEDURE — 80048 BASIC METABOLIC PNL TOTAL CA: CPT | Performed by: ORTHOPAEDIC SURGERY

## 2019-01-01 PROCEDURE — 25010000002 CEFTRIAXONE PER 250 MG: Performed by: INTERNAL MEDICINE

## 2019-01-01 PROCEDURE — 25010000002 ONDANSETRON PER 1 MG: Performed by: NURSE ANESTHETIST, CERTIFIED REGISTERED

## 2019-01-01 PROCEDURE — 71045 X-RAY EXAM CHEST 1 VIEW: CPT

## 2019-01-01 PROCEDURE — 25010000002 VANCOMYCIN PER 500 MG: Performed by: INTERNAL MEDICINE

## 2019-01-01 PROCEDURE — 87633 RESP VIRUS 12-25 TARGETS: CPT | Performed by: INTERNAL MEDICINE

## 2019-01-01 PROCEDURE — G0378 HOSPITAL OBSERVATION PER HR: HCPCS

## 2019-01-01 PROCEDURE — 93010 ELECTROCARDIOGRAM REPORT: CPT | Performed by: INTERNAL MEDICINE

## 2019-01-01 PROCEDURE — 80202 ASSAY OF VANCOMYCIN: CPT | Performed by: INTERNAL MEDICINE

## 2019-01-01 PROCEDURE — 25010000002 HYDROMORPHONE PER 4 MG: Performed by: ORTHOPAEDIC SURGERY

## 2019-01-01 PROCEDURE — 93005 ELECTROCARDIOGRAM TRACING: CPT | Performed by: INTERNAL MEDICINE

## 2019-01-01 PROCEDURE — 0BDB8ZX EXTRACTION OF LEFT LOWER LOBE BRONCHUS, VIA NATURAL OR ARTIFICIAL OPENING ENDOSCOPIC, DIAGNOSTIC: ICD-10-PCS | Performed by: INTERNAL MEDICINE

## 2019-01-01 PROCEDURE — 85007 BL SMEAR W/DIFF WBC COUNT: CPT | Performed by: PHYSICIAN ASSISTANT

## 2019-01-01 PROCEDURE — 93970 EXTREMITY STUDY: CPT

## 2019-01-01 PROCEDURE — 25010000002 CEFEPIME 2 G/NS 100 ML SOLUTION: Performed by: INTERNAL MEDICINE

## 2019-01-01 PROCEDURE — 25010000002 METHYLPREDNISOLONE PER 125 MG: Performed by: INTERNAL MEDICINE

## 2019-01-01 PROCEDURE — 97162 PT EVAL MOD COMPLEX 30 MIN: CPT

## 2019-01-01 PROCEDURE — 25010000002 ONDANSETRON PER 1 MG: Performed by: EMERGENCY MEDICINE

## 2019-01-01 PROCEDURE — 25010000003 CEFAZOLIN IN DEXTROSE 2-4 GM/100ML-% SOLUTION: Performed by: ORTHOPAEDIC SURGERY

## 2019-01-01 PROCEDURE — 85025 COMPLETE CBC W/AUTO DIFF WBC: CPT | Performed by: EMERGENCY MEDICINE

## 2019-01-01 PROCEDURE — 86850 RBC ANTIBODY SCREEN: CPT | Performed by: PHYSICIAN ASSISTANT

## 2019-01-01 PROCEDURE — 25010000002 PHENYLEPHRINE PER 1 ML: Performed by: NURSE ANESTHETIST, CERTIFIED REGISTERED

## 2019-01-01 PROCEDURE — 85610 PROTHROMBIN TIME: CPT | Performed by: ORTHOPAEDIC SURGERY

## 2019-01-01 PROCEDURE — 80053 COMPREHEN METABOLIC PANEL: CPT | Performed by: INTERNAL MEDICINE

## 2019-01-01 PROCEDURE — 87581 M.PNEUMON DNA AMP PROBE: CPT | Performed by: INTERNAL MEDICINE

## 2019-01-01 PROCEDURE — 84145 PROCALCITONIN (PCT): CPT | Performed by: HOSPITALIST

## 2019-01-01 PROCEDURE — 87077 CULTURE AEROBIC IDENTIFY: CPT

## 2019-01-01 PROCEDURE — 86900 BLOOD TYPING SEROLOGIC ABO: CPT | Performed by: NURSE PRACTITIONER

## 2019-01-01 PROCEDURE — 86850 RBC ANTIBODY SCREEN: CPT | Performed by: ORTHOPAEDIC SURGERY

## 2019-01-01 PROCEDURE — 80053 COMPREHEN METABOLIC PANEL: CPT | Performed by: EMERGENCY MEDICINE

## 2019-01-01 PROCEDURE — 87070 CULTURE OTHR SPECIMN AEROBIC: CPT | Performed by: INTERNAL MEDICINE

## 2019-01-01 PROCEDURE — C1776 JOINT DEVICE (IMPLANTABLE): HCPCS | Performed by: ORTHOPAEDIC SURGERY

## 2019-01-01 PROCEDURE — 25010000002 VANCOMYCIN 10 G RECONSTITUTED SOLUTION: Performed by: INTERNAL MEDICINE

## 2019-01-01 PROCEDURE — 86901 BLOOD TYPING SEROLOGIC RH(D): CPT | Performed by: NURSE PRACTITIONER

## 2019-01-01 PROCEDURE — 84132 ASSAY OF SERUM POTASSIUM: CPT | Performed by: ORTHOPAEDIC SURGERY

## 2019-01-01 PROCEDURE — 81003 URINALYSIS AUTO W/O SCOPE: CPT | Performed by: ORTHOPAEDIC SURGERY

## 2019-01-01 PROCEDURE — 87075 CULTR BACTERIA EXCEPT BLOOD: CPT | Performed by: ORTHOPAEDIC SURGERY

## 2019-01-01 PROCEDURE — 87899 AGENT NOS ASSAY W/OPTIC: CPT | Performed by: INTERNAL MEDICINE

## 2019-01-01 PROCEDURE — 86900 BLOOD TYPING SEROLOGIC ABO: CPT | Performed by: PHYSICIAN ASSISTANT

## 2019-01-01 PROCEDURE — 0BD48ZX EXTRACTION OF RIGHT UPPER LOBE BRONCHUS, VIA NATURAL OR ARTIFICIAL OPENING ENDOSCOPIC, DIAGNOSTIC: ICD-10-PCS | Performed by: INTERNAL MEDICINE

## 2019-01-01 PROCEDURE — 84443 ASSAY THYROID STIM HORMONE: CPT | Performed by: HOSPITALIST

## 2019-01-01 PROCEDURE — 84478 ASSAY OF TRIGLYCERIDES: CPT | Performed by: INTERNAL MEDICINE

## 2019-01-01 PROCEDURE — 25010000003 BUPIVACAINE LIPOSOME 1.3 % SUSPENSION 20 ML VIAL: Performed by: ORTHOPAEDIC SURGERY

## 2019-01-01 PROCEDURE — 25010000002 PROPOFOL 1000 MG/ML EMULSION: Performed by: EMERGENCY MEDICINE

## 2019-01-01 PROCEDURE — 93925 LOWER EXTREMITY STUDY: CPT

## 2019-01-01 PROCEDURE — 92610 EVALUATE SWALLOWING FUNCTION: CPT | Performed by: SPEECH-LANGUAGE PATHOLOGIST

## 2019-01-01 PROCEDURE — 25010000002 PHENYLEPHRINE PER 1 ML: Performed by: ANESTHESIOLOGY

## 2019-01-01 PROCEDURE — 25010000002 ONDANSETRON PER 1 MG: Performed by: ORTHOPAEDIC SURGERY

## 2019-01-01 PROCEDURE — 25010000002 NEOSTIGMINE PER 0.5 MG: Performed by: ANESTHESIOLOGY

## 2019-01-01 PROCEDURE — 87077 CULTURE AEROBIC IDENTIFY: CPT | Performed by: INTERNAL MEDICINE

## 2019-01-01 PROCEDURE — 87205 SMEAR GRAM STAIN: CPT | Performed by: INTERNAL MEDICINE

## 2019-01-01 PROCEDURE — 25010000002 ONDANSETRON PER 1 MG: Performed by: ANESTHESIOLOGY

## 2019-01-01 PROCEDURE — 87070 CULTURE OTHR SPECIMN AEROBIC: CPT | Performed by: ORTHOPAEDIC SURGERY

## 2019-01-01 PROCEDURE — 80048 BASIC METABOLIC PNL TOTAL CA: CPT | Performed by: INTERNAL MEDICINE

## 2019-01-01 PROCEDURE — 80048 BASIC METABOLIC PNL TOTAL CA: CPT | Performed by: PHYSICIAN ASSISTANT

## 2019-01-01 PROCEDURE — P9016 RBC LEUKOCYTES REDUCED: HCPCS

## 2019-01-01 PROCEDURE — 25010000002 FENTANYL CITRATE (PF) 100 MCG/2ML SOLUTION: Performed by: INTERNAL MEDICINE

## 2019-01-01 PROCEDURE — 99284 EMERGENCY DEPT VISIT MOD MDM: CPT

## 2019-01-01 PROCEDURE — 31500 INSERT EMERGENCY AIRWAY: CPT

## 2019-01-01 PROCEDURE — 0SRB06A REPLACEMENT OF LEFT HIP JOINT WITH OXIDIZED ZIRCONIUM ON POLYETHYLENE SYNTHETIC SUBSTITUTE, UNCEMENTED, OPEN APPROACH: ICD-10-PCS | Performed by: ORTHOPAEDIC SURGERY

## 2019-01-01 PROCEDURE — 81003 URINALYSIS AUTO W/O SCOPE: CPT | Performed by: INTERNAL MEDICINE

## 2019-01-01 PROCEDURE — 25010000002 MIDAZOLAM PER 1 MG: Performed by: ANESTHESIOLOGY

## 2019-01-01 PROCEDURE — 73502 X-RAY EXAM HIP UNI 2-3 VIEWS: CPT

## 2019-01-01 PROCEDURE — 93005 ELECTROCARDIOGRAM TRACING: CPT | Performed by: EMERGENCY MEDICINE

## 2019-01-01 PROCEDURE — 86900 BLOOD TYPING SEROLOGIC ABO: CPT | Performed by: ORTHOPAEDIC SURGERY

## 2019-01-01 PROCEDURE — 99285 EMERGENCY DEPT VISIT HI MDM: CPT

## 2019-01-01 PROCEDURE — 96374 THER/PROPH/DIAG INJ IV PUSH: CPT

## 2019-01-01 PROCEDURE — 87081 CULTURE SCREEN ONLY: CPT | Performed by: INTERNAL MEDICINE

## 2019-01-01 PROCEDURE — 25010000002 LORAZEPAM PER 2 MG: Performed by: EMERGENCY MEDICINE

## 2019-01-01 PROCEDURE — 0BC38ZZ EXTIRPATION OF MATTER FROM RIGHT MAIN BRONCHUS, VIA NATURAL OR ARTIFICIAL OPENING ENDOSCOPIC: ICD-10-PCS | Performed by: INTERNAL MEDICINE

## 2019-01-01 PROCEDURE — 83036 HEMOGLOBIN GLYCOSYLATED A1C: CPT | Performed by: INTERNAL MEDICINE

## 2019-01-01 PROCEDURE — 82607 VITAMIN B-12: CPT | Performed by: HOSPITALIST

## 2019-01-01 PROCEDURE — 87486 CHLMYD PNEUM DNA AMP PROBE: CPT | Performed by: INTERNAL MEDICINE

## 2019-01-01 PROCEDURE — C9290 INJ, BUPIVACAINE LIPOSOME: HCPCS | Performed by: ORTHOPAEDIC SURGERY

## 2019-01-01 PROCEDURE — 85025 COMPLETE CBC W/AUTO DIFF WBC: CPT | Performed by: INTERNAL MEDICINE

## 2019-01-01 PROCEDURE — 74018 RADEX ABDOMEN 1 VIEW: CPT

## 2019-01-01 PROCEDURE — 25810000003 SODIUM CHLORIDE 0.9 % WITH KCL 20 MEQ 20-0.9 MEQ/L-% SOLUTION: Performed by: INTERNAL MEDICINE

## 2019-01-01 PROCEDURE — 0SPS0JZ REMOVAL OF SYNTHETIC SUBSTITUTE FROM LEFT HIP JOINT, FEMORAL SURFACE, OPEN APPROACH: ICD-10-PCS | Performed by: ORTHOPAEDIC SURGERY

## 2019-01-01 PROCEDURE — 84484 ASSAY OF TROPONIN QUANT: CPT | Performed by: EMERGENCY MEDICINE

## 2019-01-01 PROCEDURE — 25010000002 HYDROMORPHONE PER 4 MG: Performed by: EMERGENCY MEDICINE

## 2019-01-01 PROCEDURE — 85025 COMPLETE CBC W/AUTO DIFF WBC: CPT | Performed by: PHYSICIAN ASSISTANT

## 2019-01-01 PROCEDURE — 84484 ASSAY OF TROPONIN QUANT: CPT | Performed by: INTERNAL MEDICINE

## 2019-01-01 PROCEDURE — 25010000002 LORAZEPAM PER 2 MG: Performed by: INTERNAL MEDICINE

## 2019-01-01 PROCEDURE — 86901 BLOOD TYPING SEROLOGIC RH(D): CPT

## 2019-01-01 PROCEDURE — 25010000002 HYDRALAZINE PER 20 MG: Performed by: INTERNAL MEDICINE

## 2019-01-01 PROCEDURE — 0LQM0ZZ REPAIR LEFT UPPER LEG TENDON, OPEN APPROACH: ICD-10-PCS | Performed by: ORTHOPAEDIC SURGERY

## 2019-01-01 PROCEDURE — 25010000002 VANCOMYCIN 1 G RECONSTITUTED SOLUTION: Performed by: ANESTHESIOLOGY

## 2019-01-01 PROCEDURE — 97161 PT EVAL LOW COMPLEX 20 MIN: CPT

## 2019-01-01 PROCEDURE — 71046 X-RAY EXAM CHEST 2 VIEWS: CPT

## 2019-01-01 PROCEDURE — A9270 NON-COVERED ITEM OR SERVICE: HCPCS | Performed by: ORTHOPAEDIC SURGERY

## 2019-01-01 PROCEDURE — 87186 SC STD MICRODIL/AGAR DIL: CPT

## 2019-01-01 PROCEDURE — 94667 MNPJ CHEST WALL 1ST: CPT

## 2019-01-01 PROCEDURE — 86901 BLOOD TYPING SEROLOGIC RH(D): CPT | Performed by: PHYSICIAN ASSISTANT

## 2019-01-01 PROCEDURE — 73700 CT LOWER EXTREMITY W/O DYE: CPT

## 2019-01-01 PROCEDURE — 87186 SC STD MICRODIL/AGAR DIL: CPT | Performed by: INTERNAL MEDICINE

## 2019-01-01 PROCEDURE — 86923 COMPATIBILITY TEST ELECTRIC: CPT

## 2019-01-01 PROCEDURE — 80053 COMPREHEN METABOLIC PANEL: CPT | Performed by: ORTHOPAEDIC SURGERY

## 2019-01-01 PROCEDURE — 82746 ASSAY OF FOLIC ACID SERUM: CPT | Performed by: HOSPITALIST

## 2019-01-01 PROCEDURE — 25010000002 MORPHINE PER 10 MG: Performed by: EMERGENCY MEDICINE

## 2019-01-01 PROCEDURE — 25010000002 METHYLPREDNISOLONE PER 125 MG: Performed by: EMERGENCY MEDICINE

## 2019-01-01 PROCEDURE — 25010000002 VANCOMYCIN PER 500 MG: Performed by: ORTHOPAEDIC SURGERY

## 2019-01-01 PROCEDURE — 99214 OFFICE O/P EST MOD 30 MIN: CPT

## 2019-01-01 PROCEDURE — 25010000002 VANCOMYCIN 750 MG RECONSTITUTED SOLUTION: Performed by: INTERNAL MEDICINE

## 2019-01-01 PROCEDURE — 25010000002 PROPOFOL 10 MG/ML EMULSION: Performed by: INTERNAL MEDICINE

## 2019-01-01 PROCEDURE — 0BH17EZ INSERTION OF ENDOTRACHEAL AIRWAY INTO TRACHEA, VIA NATURAL OR ARTIFICIAL OPENING: ICD-10-PCS | Performed by: INTERNAL MEDICINE

## 2019-01-01 PROCEDURE — 82272 OCCULT BLD FECES 1-3 TESTS: CPT | Performed by: INTERNAL MEDICINE

## 2019-01-01 PROCEDURE — 36415 COLL VENOUS BLD VENIPUNCTURE: CPT

## 2019-01-01 PROCEDURE — 25010000002 PROPOFOL 10 MG/ML EMULSION: Performed by: NURSE ANESTHETIST, CERTIFIED REGISTERED

## 2019-01-01 PROCEDURE — 63710000001 MUPIROCIN 2 % OINTMENT: Performed by: ORTHOPAEDIC SURGERY

## 2019-01-01 PROCEDURE — 87205 SMEAR GRAM STAIN: CPT | Performed by: ORTHOPAEDIC SURGERY

## 2019-01-01 PROCEDURE — 96375 TX/PRO/DX INJ NEW DRUG ADDON: CPT

## 2019-01-01 PROCEDURE — 25010000002 PROPOFOL 10 MG/ML EMULSION: Performed by: EMERGENCY MEDICINE

## 2019-01-01 PROCEDURE — 99291 CRITICAL CARE FIRST HOUR: CPT

## 2019-01-01 DEVICE — IMPLANTABLE DEVICE: Type: IMPLANTABLE DEVICE | Site: HIP | Status: FUNCTIONAL

## 2019-01-01 DEVICE — R3 20 DEGREE XLPE ACETABULAR LINER                                    32MM INNER DIAMETER X OUTER DIAMETER 48MM
Type: IMPLANTABLE DEVICE | Site: HIP | Status: FUNCTIONAL
Brand: R3

## 2019-01-01 DEVICE — SUT FW 5 W .5 CIR CUT NDL 48M AR7211: Type: IMPLANTABLE DEVICE | Site: HIP | Status: FUNCTIONAL

## 2019-01-01 DEVICE — REFLECTION SPHERICAL HEAD SCREW 30MM
Type: IMPLANTABLE DEVICE | Site: HIP | Status: FUNCTIONAL
Brand: REFLECTION

## 2019-01-01 DEVICE — R3 3 HOLE ACETABULAR SHELL 48MM
Type: IMPLANTABLE DEVICE | Site: HIP | Status: FUNCTIONAL
Brand: R3 ACETABULAR

## 2019-01-01 DEVICE — SUT FW #2 W/TPR NDL 1/2 CIR 38IN 97CM 26.5MM BLU: Type: IMPLANTABLE DEVICE | Site: HIP | Status: FUNCTIONAL

## 2019-01-01 DEVICE — POLARSTEM LATERAL NON-CEMENTED                                    WITH TI/HA 2
Type: IMPLANTABLE DEVICE | Site: HIP | Status: NON-FUNCTIONAL
Brand: POLARSTEM
Removed: 2019-06-12

## 2019-01-01 DEVICE — OXINIUM FEMORAL HEAD 12/14 TAPER                                    32MM +0
Type: IMPLANTABLE DEVICE | Site: HIP | Status: NON-FUNCTIONAL
Brand: OXINIUM
Removed: 2019-06-12

## 2019-01-01 DEVICE — REFLECTION SPHERICAL HEAD SCREW 40MM
Type: IMPLANTABLE DEVICE | Site: HIP | Status: FUNCTIONAL
Brand: REFLECTION

## 2019-01-01 RX ORDER — ASPIRIN 81 MG/1
81 TABLET ORAL DAILY
Status: DISCONTINUED | OUTPATIENT
Start: 2019-01-01 | End: 2019-01-01 | Stop reason: HOSPADM

## 2019-01-01 RX ORDER — ACETAMINOPHEN 325 MG/1
650 TABLET ORAL EVERY 4 HOURS PRN
Status: DISCONTINUED | OUTPATIENT
Start: 2019-01-01 | End: 2019-01-01 | Stop reason: HOSPADM

## 2019-01-01 RX ORDER — HYDROMORPHONE HCL 110MG/55ML
PATIENT CONTROLLED ANALGESIA SYRINGE INTRAVENOUS AS NEEDED
Status: DISCONTINUED | OUTPATIENT
Start: 2019-01-01 | End: 2019-01-01 | Stop reason: SURG

## 2019-01-01 RX ORDER — SUCRALFATE ORAL 1 G/10ML
500 SUSPENSION ORAL
Status: DISCONTINUED | OUTPATIENT
Start: 2019-01-01 | End: 2019-01-01 | Stop reason: HOSPADM

## 2019-01-01 RX ORDER — WOUND DRESSING ADHESIVE - LIQUID
LIQUID MISCELLANEOUS AS NEEDED
Status: DISCONTINUED | OUTPATIENT
Start: 2019-01-01 | End: 2019-01-01 | Stop reason: HOSPADM

## 2019-01-01 RX ORDER — EPHEDRINE SULFATE 50 MG/ML
INJECTION, SOLUTION INTRAVENOUS AS NEEDED
Status: DISCONTINUED | OUTPATIENT
Start: 2019-01-01 | End: 2019-01-01 | Stop reason: SURG

## 2019-01-01 RX ORDER — SUCRALFATE ORAL 1 G/10ML
1 SUSPENSION ORAL
Status: DISCONTINUED | OUTPATIENT
Start: 2019-01-01 | End: 2019-01-01 | Stop reason: HOSPADM

## 2019-01-01 RX ORDER — MORPHINE SULFATE 2 MG/ML
1 INJECTION, SOLUTION INTRAMUSCULAR; INTRAVENOUS ONCE
Status: COMPLETED | OUTPATIENT
Start: 2019-01-01 | End: 2019-01-01

## 2019-01-01 RX ORDER — ACETAMINOPHEN 650 MG/1
650 SUPPOSITORY RECTAL EVERY 4 HOURS PRN
Status: CANCELLED | OUTPATIENT
Start: 2019-01-01

## 2019-01-01 RX ORDER — GLYCOPYRROLATE 0.2 MG/ML
0.2 INJECTION INTRAMUSCULAR; INTRAVENOUS
Status: DISCONTINUED | OUTPATIENT
Start: 2019-01-01 | End: 2019-01-01 | Stop reason: HOSPADM

## 2019-01-01 RX ORDER — DICYCLOMINE HYDROCHLORIDE 10 MG/1
10 CAPSULE ORAL 4 TIMES DAILY
Status: DISCONTINUED | OUTPATIENT
Start: 2019-01-01 | End: 2019-01-01 | Stop reason: HOSPADM

## 2019-01-01 RX ORDER — SODIUM CHLORIDE 0.9 % (FLUSH) 0.9 %
3 SYRINGE (ML) INJECTION EVERY 12 HOURS SCHEDULED
Status: DISCONTINUED | OUTPATIENT
Start: 2019-01-01 | End: 2019-01-01 | Stop reason: HOSPADM

## 2019-01-01 RX ORDER — SODIUM CHLORIDE AND POTASSIUM CHLORIDE 150; 900 MG/100ML; MG/100ML
50 INJECTION, SOLUTION INTRAVENOUS CONTINUOUS
Status: DISCONTINUED | OUTPATIENT
Start: 2019-01-01 | End: 2019-01-01

## 2019-01-01 RX ORDER — ALPRAZOLAM 0.5 MG/1
0.5 TABLET ORAL 3 TIMES DAILY
Status: DISCONTINUED | OUTPATIENT
Start: 2019-01-01 | End: 2019-01-01

## 2019-01-01 RX ORDER — MELOXICAM 15 MG/1
15 TABLET ORAL DAILY
Status: CANCELLED | OUTPATIENT
Start: 2019-01-01

## 2019-01-01 RX ORDER — MORPHINE SULFATE 20 MG/ML
5 SOLUTION ORAL
Status: DISCONTINUED | OUTPATIENT
Start: 2019-01-01 | End: 2019-01-01 | Stop reason: HOSPADM

## 2019-01-01 RX ORDER — PROMETHAZINE HYDROCHLORIDE 6.25 MG/5ML
6.25 SYRUP ORAL EVERY 4 HOURS PRN
Status: DISCONTINUED | OUTPATIENT
Start: 2019-01-01 | End: 2019-01-01 | Stop reason: HOSPADM

## 2019-01-01 RX ORDER — DIPHENOXYLATE HYDROCHLORIDE AND ATROPINE SULFATE 2.5; .025 MG/1; MG/1
1 TABLET ORAL
Status: CANCELLED | OUTPATIENT
Start: 2019-01-01

## 2019-01-01 RX ORDER — HYDRALAZINE HYDROCHLORIDE 20 MG/ML
20 INJECTION INTRAMUSCULAR; INTRAVENOUS EVERY 6 HOURS PRN
Status: DISCONTINUED | OUTPATIENT
Start: 2019-01-01 | End: 2019-01-01

## 2019-01-01 RX ORDER — SODIUM CHLORIDE, SODIUM LACTATE, POTASSIUM CHLORIDE, CALCIUM CHLORIDE 600; 310; 30; 20 MG/100ML; MG/100ML; MG/100ML; MG/100ML
100 INJECTION, SOLUTION INTRAVENOUS CONTINUOUS
Status: DISCONTINUED | OUTPATIENT
Start: 2019-01-01 | End: 2019-01-01 | Stop reason: HOSPADM

## 2019-01-01 RX ORDER — DIPHENOXYLATE HYDROCHLORIDE AND ATROPINE SULFATE 2.5; .025 MG/1; MG/1
1 TABLET ORAL
Status: DISCONTINUED | OUTPATIENT
Start: 2019-01-01 | End: 2019-01-01 | Stop reason: HOSPADM

## 2019-01-01 RX ORDER — ATORVASTATIN CALCIUM 10 MG/1
10 TABLET, FILM COATED ORAL EVERY EVENING
Status: DISCONTINUED | OUTPATIENT
Start: 2019-01-01 | End: 2019-01-01 | Stop reason: HOSPADM

## 2019-01-01 RX ORDER — PREDNISONE 20 MG/1
20 TABLET ORAL DAILY
Status: DISCONTINUED | OUTPATIENT
Start: 2019-01-01 | End: 2019-01-01

## 2019-01-01 RX ORDER — SODIUM CHLORIDE, SODIUM LACTATE, POTASSIUM CHLORIDE, CALCIUM CHLORIDE 600; 310; 30; 20 MG/100ML; MG/100ML; MG/100ML; MG/100ML
100 INJECTION, SOLUTION INTRAVENOUS CONTINUOUS
Status: DISCONTINUED | OUTPATIENT
Start: 2019-01-01 | End: 2019-01-01

## 2019-01-01 RX ORDER — LIDOCAINE HYDROCHLORIDE 20 MG/ML
INJECTION, SOLUTION INFILTRATION; PERINEURAL AS NEEDED
Status: DISCONTINUED | OUTPATIENT
Start: 2019-01-01 | End: 2019-01-01 | Stop reason: SURG

## 2019-01-01 RX ORDER — ATORVASTATIN CALCIUM 10 MG/1
10 TABLET, FILM COATED ORAL EVERY EVENING
Status: DISCONTINUED | OUTPATIENT
Start: 2019-01-01 | End: 2019-01-01

## 2019-01-01 RX ORDER — MIDAZOLAM HYDROCHLORIDE 1 MG/ML
1 INJECTION INTRAMUSCULAR; INTRAVENOUS
Status: DISCONTINUED | OUTPATIENT
Start: 2019-01-01 | End: 2019-01-01 | Stop reason: HOSPADM

## 2019-01-01 RX ORDER — BISACODYL 10 MG
10 SUPPOSITORY, RECTAL RECTAL DAILY
Status: DISCONTINUED | OUTPATIENT
Start: 2019-01-01 | End: 2019-01-01

## 2019-01-01 RX ORDER — PROMETHAZINE HYDROCHLORIDE 6.25 MG/5ML
6.25 SYRUP ORAL EVERY 4 HOURS PRN
Status: CANCELLED | OUTPATIENT
Start: 2019-01-01

## 2019-01-01 RX ORDER — TRANEXAMIC ACID 100 MG/ML
INJECTION, SOLUTION INTRAVENOUS AS NEEDED
Status: DISCONTINUED | OUTPATIENT
Start: 2019-01-01 | End: 2019-01-01 | Stop reason: SURG

## 2019-01-01 RX ORDER — MORPHINE SULFATE 2 MG/ML
1 INJECTION, SOLUTION INTRAMUSCULAR; INTRAVENOUS ONCE
Status: DISCONTINUED | OUTPATIENT
Start: 2019-01-01 | End: 2019-01-01

## 2019-01-01 RX ORDER — CARVEDILOL 6.25 MG/1
6.25 TABLET ORAL 2 TIMES DAILY
Status: DISCONTINUED | OUTPATIENT
Start: 2019-01-01 | End: 2019-01-01 | Stop reason: HOSPADM

## 2019-01-01 RX ORDER — ASPIRIN 325 MG
325 TABLET, DELAYED RELEASE (ENTERIC COATED) ORAL DAILY
Status: DISCONTINUED | OUTPATIENT
Start: 2019-01-01 | End: 2019-01-01

## 2019-01-01 RX ORDER — CHLORHEXIDINE GLUCONATE 500 MG/1
CLOTH TOPICAL
Status: ON HOLD | COMMUNITY
End: 2019-01-01

## 2019-01-01 RX ORDER — ASPIRIN 81 MG/1
81 TABLET ORAL DAILY
Qty: 42 TABLET | Refills: 0 | Status: SHIPPED | OUTPATIENT
Start: 2019-01-01 | End: 2019-08-01

## 2019-01-01 RX ORDER — ATORVASTATIN CALCIUM 10 MG/1
10 TABLET, FILM COATED ORAL NIGHTLY
Status: DISCONTINUED | OUTPATIENT
Start: 2019-01-01 | End: 2019-01-01 | Stop reason: HOSPADM

## 2019-01-01 RX ORDER — HYDROCODONE BITARTRATE AND ACETAMINOPHEN 5; 325 MG/1; MG/1
1 TABLET ORAL EVERY 4 HOURS PRN
Status: DISCONTINUED | OUTPATIENT
Start: 2019-01-01 | End: 2019-01-01

## 2019-01-01 RX ORDER — PROMETHAZINE HYDROCHLORIDE 25 MG/ML
6.25 INJECTION, SOLUTION INTRAMUSCULAR; INTRAVENOUS
Status: DISCONTINUED | OUTPATIENT
Start: 2019-01-01 | End: 2019-01-01 | Stop reason: HOSPADM

## 2019-01-01 RX ORDER — LORAZEPAM 2 MG/ML
1 INJECTION INTRAMUSCULAR
Status: CANCELLED | OUTPATIENT
Start: 2019-01-01 | End: 2019-07-11

## 2019-01-01 RX ORDER — SODIUM CHLORIDE, SODIUM LACTATE, POTASSIUM CHLORIDE, CALCIUM CHLORIDE 600; 310; 30; 20 MG/100ML; MG/100ML; MG/100ML; MG/100ML
9 INJECTION, SOLUTION INTRAVENOUS CONTINUOUS
Status: DISCONTINUED | OUTPATIENT
Start: 2019-01-01 | End: 2019-01-01 | Stop reason: SDUPTHER

## 2019-01-01 RX ORDER — ONDANSETRON 4 MG/1
4 TABLET, FILM COATED ORAL EVERY 6 HOURS PRN
Status: DISCONTINUED | OUTPATIENT
Start: 2019-01-01 | End: 2019-01-01 | Stop reason: HOSPADM

## 2019-01-01 RX ORDER — BUSPIRONE HYDROCHLORIDE 10 MG/1
10 TABLET ORAL 2 TIMES DAILY
COMMUNITY

## 2019-01-01 RX ORDER — NITROGLYCERIN 0.4 MG/1
0.4 TABLET SUBLINGUAL TAKE AS DIRECTED
Status: DISCONTINUED | OUTPATIENT
Start: 2019-01-01 | End: 2019-01-01 | Stop reason: HOSPADM

## 2019-01-01 RX ORDER — HYDRALAZINE HYDROCHLORIDE 20 MG/ML
5 INJECTION INTRAMUSCULAR; INTRAVENOUS
Status: DISCONTINUED | OUTPATIENT
Start: 2019-01-01 | End: 2019-01-01 | Stop reason: HOSPADM

## 2019-01-01 RX ORDER — PREDNISONE 20 MG/1
20 TABLET ORAL DAILY
COMMUNITY
Start: 2019-01-01

## 2019-01-01 RX ORDER — BISACODYL 10 MG
10 SUPPOSITORY, RECTAL RECTAL DAILY PRN
Status: DISCONTINUED | OUTPATIENT
Start: 2019-01-01 | End: 2019-01-01 | Stop reason: HOSPADM

## 2019-01-01 RX ORDER — GLYCOPYRROLATE 0.2 MG/ML
0.4 INJECTION INTRAMUSCULAR; INTRAVENOUS
Status: CANCELLED | OUTPATIENT
Start: 2019-01-01

## 2019-01-01 RX ORDER — MULTIVITAMIN WITH FOLIC ACID 400 MCG
1 TABLET ORAL DAILY
COMMUNITY
Start: 2017-02-22

## 2019-01-01 RX ORDER — LORAZEPAM 2 MG/ML
1 CONCENTRATE ORAL
Status: CANCELLED | OUTPATIENT
Start: 2019-01-01 | End: 2019-07-11

## 2019-01-01 RX ORDER — LORAZEPAM 2 MG/ML
0.5 CONCENTRATE ORAL
Status: CANCELLED | OUTPATIENT
Start: 2019-01-01 | End: 2019-07-11

## 2019-01-01 RX ORDER — PANTOPRAZOLE SODIUM 40 MG/1
40 TABLET, DELAYED RELEASE ORAL DAILY
COMMUNITY
Start: 2018-03-30

## 2019-01-01 RX ORDER — ACETAMINOPHEN 650 MG/1
650 SUPPOSITORY RECTAL EVERY 4 HOURS PRN
Status: DISCONTINUED | OUTPATIENT
Start: 2019-01-01 | End: 2019-01-01 | Stop reason: HOSPADM

## 2019-01-01 RX ORDER — GLYCOPYRROLATE 0.2 MG/ML
0.4 INJECTION INTRAMUSCULAR; INTRAVENOUS
Status: DISCONTINUED | OUTPATIENT
Start: 2019-01-01 | End: 2019-01-01 | Stop reason: HOSPADM

## 2019-01-01 RX ORDER — MORPHINE SULFATE 20 MG/ML
10 SOLUTION ORAL
Status: DISCONTINUED | OUTPATIENT
Start: 2019-01-01 | End: 2019-01-01 | Stop reason: HOSPADM

## 2019-01-01 RX ORDER — CEFTRIAXONE SODIUM 1 G/50ML
1 INJECTION, SOLUTION INTRAVENOUS EVERY 24 HOURS
Status: DISCONTINUED | OUTPATIENT
Start: 2019-01-01 | End: 2019-01-01

## 2019-01-01 RX ORDER — LORAZEPAM 2 MG/ML
2 INJECTION INTRAMUSCULAR
Status: CANCELLED | OUTPATIENT
Start: 2019-01-01 | End: 2019-07-11

## 2019-01-01 RX ORDER — DEXMEDETOMIDINE HYDROCHLORIDE 4 UG/ML
.2-1.5 INJECTION, SOLUTION INTRAVENOUS
Status: DISCONTINUED | OUTPATIENT
Start: 2019-01-01 | End: 2019-01-01

## 2019-01-01 RX ORDER — ACETAMINOPHEN 650 MG/1
650 SUPPOSITORY RECTAL EVERY 4 HOURS PRN
Status: DISCONTINUED | OUTPATIENT
Start: 2019-01-01 | End: 2019-01-01

## 2019-01-01 RX ORDER — ASPIRIN 81 MG/1
81 TABLET ORAL DAILY
Qty: 42 TABLET | Refills: 0 | Status: SHIPPED | OUTPATIENT
Start: 2019-01-01 | End: 2019-01-01

## 2019-01-01 RX ORDER — LIDOCAINE HYDROCHLORIDE 40 MG/ML
SOLUTION TOPICAL
Status: COMPLETED
Start: 2019-01-01 | End: 2019-01-01

## 2019-01-01 RX ORDER — LOSARTAN POTASSIUM 50 MG/1
50 TABLET ORAL DAILY
Status: ON HOLD | COMMUNITY
Start: 2019-01-01 | End: 2019-01-01 | Stop reason: SDUPTHER

## 2019-01-01 RX ORDER — PROPOFOL 10 MG/ML
VIAL (ML) INTRAVENOUS AS NEEDED
Status: DISCONTINUED | OUTPATIENT
Start: 2019-01-01 | End: 2019-01-01 | Stop reason: SURG

## 2019-01-01 RX ORDER — ALBUTEROL SULFATE 2.5 MG/3ML
2.5 SOLUTION RESPIRATORY (INHALATION)
Status: DISPENSED | OUTPATIENT
Start: 2019-01-01 | End: 2019-01-01

## 2019-01-01 RX ORDER — PROPOFOL 10 MG/ML
VIAL (ML) INTRAVENOUS
Status: COMPLETED
Start: 2019-01-01 | End: 2019-01-01

## 2019-01-01 RX ORDER — EPHEDRINE SULFATE 50 MG/ML
5 INJECTION, SOLUTION INTRAVENOUS ONCE AS NEEDED
Status: DISCONTINUED | OUTPATIENT
Start: 2019-01-01 | End: 2019-01-01 | Stop reason: HOSPADM

## 2019-01-01 RX ORDER — ONDANSETRON 2 MG/ML
4 INJECTION INTRAMUSCULAR; INTRAVENOUS EVERY 6 HOURS PRN
Status: DISCONTINUED | OUTPATIENT
Start: 2019-01-01 | End: 2019-01-01 | Stop reason: HOSPADM

## 2019-01-01 RX ORDER — METHYLPREDNISOLONE SODIUM SUCCINATE 40 MG/ML
40 INJECTION, POWDER, LYOPHILIZED, FOR SOLUTION INTRAMUSCULAR; INTRAVENOUS EVERY 8 HOURS
Status: DISCONTINUED | OUTPATIENT
Start: 2019-01-01 | End: 2019-01-01

## 2019-01-01 RX ORDER — ALBUTEROL SULFATE 90 UG/1
6 AEROSOL, METERED RESPIRATORY (INHALATION)
Status: DISCONTINUED | OUTPATIENT
Start: 2019-01-01 | End: 2019-01-01

## 2019-01-01 RX ORDER — HALOPERIDOL 2 MG/ML
1 SOLUTION ORAL EVERY 4 HOURS PRN
Status: CANCELLED | OUTPATIENT
Start: 2019-01-01

## 2019-01-01 RX ORDER — SODIUM CHLORIDE 9 MG/ML
100 INJECTION, SOLUTION INTRAVENOUS CONTINUOUS
Status: DISCONTINUED | OUTPATIENT
Start: 2019-01-01 | End: 2019-01-01 | Stop reason: HOSPADM

## 2019-01-01 RX ORDER — PROMETHAZINE HYDROCHLORIDE 12.5 MG/1
6.25 SUPPOSITORY RECTAL EVERY 4 HOURS PRN
Status: CANCELLED | OUTPATIENT
Start: 2019-01-01

## 2019-01-01 RX ORDER — NALOXONE HCL 0.4 MG/ML
0.2 VIAL (ML) INJECTION AS NEEDED
Status: DISCONTINUED | OUTPATIENT
Start: 2019-01-01 | End: 2019-01-01 | Stop reason: HOSPADM

## 2019-01-01 RX ORDER — POTASSIUM CHLORIDE 7.45 MG/ML
10 INJECTION INTRAVENOUS
Status: DISCONTINUED | OUTPATIENT
Start: 2019-01-01 | End: 2019-01-01

## 2019-01-01 RX ORDER — ASPIRIN 81 MG/1
81 TABLET ORAL DAILY
Status: DISCONTINUED | OUTPATIENT
Start: 2019-01-01 | End: 2019-01-01

## 2019-01-01 RX ORDER — HYDROCODONE BITARTRATE AND ACETAMINOPHEN 5; 325 MG/1; MG/1
1 TABLET ORAL EVERY 4 HOURS PRN
COMMUNITY

## 2019-01-01 RX ORDER — SODIUM CHLORIDE 0.9 % (FLUSH) 0.9 %
3-10 SYRINGE (ML) INJECTION AS NEEDED
Status: DISCONTINUED | OUTPATIENT
Start: 2019-01-01 | End: 2019-01-01 | Stop reason: HOSPADM

## 2019-01-01 RX ORDER — DIPHENHYDRAMINE HCL 25 MG
25 CAPSULE ORAL EVERY 6 HOURS PRN
Status: DISCONTINUED | OUTPATIENT
Start: 2019-01-01 | End: 2019-01-01 | Stop reason: HOSPADM

## 2019-01-01 RX ORDER — DIPHENHYDRAMINE HYDROCHLORIDE 50 MG/ML
6.25 INJECTION INTRAMUSCULAR; INTRAVENOUS
Status: DISCONTINUED | OUTPATIENT
Start: 2019-01-01 | End: 2019-01-01 | Stop reason: HOSPADM

## 2019-01-01 RX ORDER — SCOLOPAMINE TRANSDERMAL SYSTEM 1 MG/1
1 PATCH, EXTENDED RELEASE TRANSDERMAL
Status: CANCELLED | OUTPATIENT
Start: 2019-01-01

## 2019-01-01 RX ORDER — CARVEDILOL 6.25 MG/1
6.25 TABLET ORAL 2 TIMES DAILY
COMMUNITY
Start: 2019-01-01

## 2019-01-01 RX ORDER — SODIUM CHLORIDE 0.9 % (FLUSH) 0.9 %
10 SYRINGE (ML) INJECTION AS NEEDED
Status: DISCONTINUED | OUTPATIENT
Start: 2019-01-01 | End: 2019-01-01 | Stop reason: HOSPADM

## 2019-01-01 RX ORDER — FLUMAZENIL 0.1 MG/ML
0.2 INJECTION INTRAVENOUS AS NEEDED
Status: DISCONTINUED | OUTPATIENT
Start: 2019-01-01 | End: 2019-01-01 | Stop reason: HOSPADM

## 2019-01-01 RX ORDER — LIDOCAINE HYDROCHLORIDE 10 MG/ML
0.5 INJECTION, SOLUTION EPIDURAL; INFILTRATION; INTRACAUDAL; PERINEURAL ONCE AS NEEDED
Status: DISCONTINUED | OUTPATIENT
Start: 2019-01-01 | End: 2019-01-01 | Stop reason: HOSPADM

## 2019-01-01 RX ORDER — FAMOTIDINE 10 MG/ML
INJECTION, SOLUTION INTRAVENOUS
Status: COMPLETED
Start: 2019-01-01 | End: 2019-01-01

## 2019-01-01 RX ORDER — GUAIFENESIN 600 MG/1
1200 TABLET, EXTENDED RELEASE ORAL 2 TIMES DAILY
COMMUNITY

## 2019-01-01 RX ORDER — PROMETHAZINE HYDROCHLORIDE 25 MG/1
25 TABLET ORAL ONCE AS NEEDED
Status: DISCONTINUED | OUTPATIENT
Start: 2019-01-01 | End: 2019-01-01 | Stop reason: HOSPADM

## 2019-01-01 RX ORDER — PREDNISONE 20 MG/1
40 TABLET ORAL
Status: DISCONTINUED | OUTPATIENT
Start: 2019-01-01 | End: 2019-01-01

## 2019-01-01 RX ORDER — MORPHINE SULFATE 20 MG/ML
20 SOLUTION ORAL
Status: CANCELLED | OUTPATIENT
Start: 2019-01-01 | End: 2019-07-11

## 2019-01-01 RX ORDER — PROMETHAZINE HYDROCHLORIDE 25 MG/ML
12.5 INJECTION, SOLUTION INTRAMUSCULAR; INTRAVENOUS ONCE AS NEEDED
Status: DISCONTINUED | OUTPATIENT
Start: 2019-01-01 | End: 2019-01-01 | Stop reason: HOSPADM

## 2019-01-01 RX ORDER — LABETALOL HYDROCHLORIDE 5 MG/ML
5 INJECTION, SOLUTION INTRAVENOUS
Status: DISCONTINUED | OUTPATIENT
Start: 2019-01-01 | End: 2019-01-01 | Stop reason: HOSPADM

## 2019-01-01 RX ORDER — FENTANYL CITRATE 50 UG/ML
50 INJECTION, SOLUTION INTRAMUSCULAR; INTRAVENOUS
Status: DISCONTINUED | OUTPATIENT
Start: 2019-01-01 | End: 2019-01-01 | Stop reason: HOSPADM

## 2019-01-01 RX ORDER — HYDROCODONE BITARTRATE AND ACETAMINOPHEN 7.5; 325 MG/1; MG/1
1 TABLET ORAL ONCE AS NEEDED
Status: DISCONTINUED | OUTPATIENT
Start: 2019-01-01 | End: 2019-01-01 | Stop reason: HOSPADM

## 2019-01-01 RX ORDER — ACETAMINOPHEN 500 MG
1000 TABLET ORAL ONCE
Status: COMPLETED | OUTPATIENT
Start: 2019-01-01 | End: 2019-01-01

## 2019-01-01 RX ORDER — DIPHENHYDRAMINE HCL 25 MG
25 CAPSULE ORAL EVERY 6 HOURS PRN
Status: CANCELLED | OUTPATIENT
Start: 2019-01-01

## 2019-01-01 RX ORDER — IPRATROPIUM BROMIDE AND ALBUTEROL SULFATE 2.5; .5 MG/3ML; MG/3ML
3 SOLUTION RESPIRATORY (INHALATION)
Status: DISCONTINUED | OUTPATIENT
Start: 2019-01-01 | End: 2019-01-01

## 2019-01-01 RX ORDER — CEFAZOLIN SODIUM 2 G/100ML
2 INJECTION, SOLUTION INTRAVENOUS EVERY 8 HOURS
Status: COMPLETED | OUTPATIENT
Start: 2019-01-01 | End: 2019-01-01

## 2019-01-01 RX ORDER — LORAZEPAM 2 MG/ML
1 INJECTION INTRAMUSCULAR
Status: DISCONTINUED | OUTPATIENT
Start: 2019-01-01 | End: 2019-01-01 | Stop reason: HOSPADM

## 2019-01-01 RX ORDER — HALOPERIDOL 5 MG/ML
1 INJECTION INTRAMUSCULAR EVERY 4 HOURS PRN
Status: CANCELLED | OUTPATIENT
Start: 2019-01-01

## 2019-01-01 RX ORDER — LOPERAMIDE HYDROCHLORIDE 2 MG/1
2 CAPSULE ORAL 4 TIMES DAILY PRN
Status: DISCONTINUED | OUTPATIENT
Start: 2019-01-01 | End: 2019-01-01 | Stop reason: HOSPADM

## 2019-01-01 RX ORDER — BUSPIRONE HYDROCHLORIDE 10 MG/1
10 TABLET ORAL 2 TIMES DAILY
Status: DISCONTINUED | OUTPATIENT
Start: 2019-01-01 | End: 2019-01-01 | Stop reason: HOSPADM

## 2019-01-01 RX ORDER — HYDROCODONE BITARTRATE AND ACETAMINOPHEN 5; 325 MG/1; MG/1
1 TABLET ORAL EVERY 6 HOURS PRN
COMMUNITY
Start: 2019-01-01 | End: 2019-01-01 | Stop reason: HOSPADM

## 2019-01-01 RX ORDER — POTASSIUM CHLORIDE 1.5 G/1.77G
40 POWDER, FOR SOLUTION ORAL AS NEEDED
Status: DISCONTINUED | OUTPATIENT
Start: 2019-01-01 | End: 2019-01-01 | Stop reason: HOSPADM

## 2019-01-01 RX ORDER — ACETAMINOPHEN 500 MG
1000 TABLET ORAL ONCE
Status: DISCONTINUED | OUTPATIENT
Start: 2019-01-01 | End: 2019-01-01 | Stop reason: HOSPADM

## 2019-01-01 RX ORDER — DIPHENHYDRAMINE HYDROCHLORIDE 50 MG/ML
25 INJECTION INTRAMUSCULAR; INTRAVENOUS EVERY 6 HOURS PRN
Status: DISCONTINUED | OUTPATIENT
Start: 2019-01-01 | End: 2019-01-01 | Stop reason: HOSPADM

## 2019-01-01 RX ORDER — PROMETHAZINE HYDROCHLORIDE 25 MG/1
25 SUPPOSITORY RECTAL ONCE AS NEEDED
Status: DISCONTINUED | OUTPATIENT
Start: 2019-01-01 | End: 2019-01-01 | Stop reason: HOSPADM

## 2019-01-01 RX ORDER — MORPHINE SULFATE 2 MG/ML
4 INJECTION, SOLUTION INTRAMUSCULAR; INTRAVENOUS
Status: CANCELLED | OUTPATIENT
Start: 2019-01-01 | End: 2019-07-11

## 2019-01-01 RX ORDER — DIPHENHYDRAMINE HCL 25 MG
25 CAPSULE ORAL
Status: DISCONTINUED | OUTPATIENT
Start: 2019-01-01 | End: 2019-01-01 | Stop reason: HOSPADM

## 2019-01-01 RX ORDER — SENNA AND DOCUSATE SODIUM 50; 8.6 MG/1; MG/1
2 TABLET, FILM COATED ORAL ONCE
Status: COMPLETED | OUTPATIENT
Start: 2019-01-01 | End: 2019-01-01

## 2019-01-01 RX ORDER — KETOROLAC TROMETHAMINE 30 MG/ML
15 INJECTION, SOLUTION INTRAMUSCULAR; INTRAVENOUS EVERY 6 HOURS
Status: COMPLETED | OUTPATIENT
Start: 2019-01-01 | End: 2019-01-01

## 2019-01-01 RX ORDER — PANTOPRAZOLE SODIUM 40 MG/1
40 TABLET, DELAYED RELEASE ORAL DAILY
Status: DISCONTINUED | OUTPATIENT
Start: 2019-01-01 | End: 2019-01-01 | Stop reason: HOSPADM

## 2019-01-01 RX ORDER — POTASSIUM CHLORIDE 750 MG/1
40 CAPSULE, EXTENDED RELEASE ORAL AS NEEDED
Status: DISCONTINUED | OUTPATIENT
Start: 2019-01-01 | End: 2019-01-01 | Stop reason: HOSPADM

## 2019-01-01 RX ORDER — HYDROCODONE BITARTRATE AND ACETAMINOPHEN 7.5; 325 MG/1; MG/1
1 TABLET ORAL EVERY 4 HOURS PRN
Status: DISCONTINUED | OUTPATIENT
Start: 2019-01-01 | End: 2019-01-01 | Stop reason: HOSPADM

## 2019-01-01 RX ORDER — LOSARTAN POTASSIUM 50 MG/1
50 TABLET ORAL DAILY
Status: DISCONTINUED | OUTPATIENT
Start: 2019-01-01 | End: 2019-01-01 | Stop reason: HOSPADM

## 2019-01-01 RX ORDER — METHYLPREDNISOLONE SODIUM SUCCINATE 40 MG/ML
40 INJECTION, POWDER, LYOPHILIZED, FOR SOLUTION INTRAMUSCULAR; INTRAVENOUS DAILY
Status: DISCONTINUED | OUTPATIENT
Start: 2019-01-01 | End: 2019-01-01

## 2019-01-01 RX ORDER — FENTANYL CITRATE 50 UG/ML
25 INJECTION, SOLUTION INTRAMUSCULAR; INTRAVENOUS
Status: DISCONTINUED | OUTPATIENT
Start: 2019-01-01 | End: 2019-01-01

## 2019-01-01 RX ORDER — NALOXONE HCL 0.4 MG/ML
0.1 VIAL (ML) INJECTION
Status: DISCONTINUED | OUTPATIENT
Start: 2019-01-01 | End: 2019-01-01 | Stop reason: HOSPADM

## 2019-01-01 RX ORDER — ACETAMINOPHEN 160 MG/5ML
650 SOLUTION ORAL EVERY 4 HOURS PRN
Status: DISCONTINUED | OUTPATIENT
Start: 2019-01-01 | End: 2019-01-01 | Stop reason: HOSPADM

## 2019-01-01 RX ORDER — ROFLUMILAST 500 UG/1
500 TABLET ORAL DAILY
Status: DISCONTINUED | OUTPATIENT
Start: 2019-01-01 | End: 2019-01-01 | Stop reason: HOSPADM

## 2019-01-01 RX ORDER — FENTANYL CITRATE 50 UG/ML
INJECTION, SOLUTION INTRAMUSCULAR; INTRAVENOUS
Status: COMPLETED
Start: 2019-01-01 | End: 2019-01-01

## 2019-01-01 RX ORDER — DICYCLOMINE HYDROCHLORIDE 10 MG/1
10 CAPSULE ORAL 3 TIMES DAILY
Status: DISCONTINUED | OUTPATIENT
Start: 2019-01-01 | End: 2019-01-01 | Stop reason: HOSPADM

## 2019-01-01 RX ORDER — LIDOCAINE HYDROCHLORIDE 40 MG/ML
SOLUTION TOPICAL ONCE
Status: COMPLETED | OUTPATIENT
Start: 2019-01-01 | End: 2019-01-01

## 2019-01-01 RX ORDER — IPRATROPIUM BROMIDE AND ALBUTEROL SULFATE 2.5; .5 MG/3ML; MG/3ML
3 SOLUTION RESPIRATORY (INHALATION) 4 TIMES DAILY PRN
COMMUNITY
Start: 2018-03-30

## 2019-01-01 RX ORDER — POTASSIUM CHLORIDE 7.45 MG/ML
10 INJECTION INTRAVENOUS
Status: DISCONTINUED | OUTPATIENT
Start: 2019-01-01 | End: 2019-01-01 | Stop reason: SDUPTHER

## 2019-01-01 RX ORDER — HYDROMORPHONE HYDROCHLORIDE 1 MG/ML
0.5 INJECTION, SOLUTION INTRAMUSCULAR; INTRAVENOUS; SUBCUTANEOUS ONCE
Status: COMPLETED | OUTPATIENT
Start: 2019-01-01 | End: 2019-01-01

## 2019-01-01 RX ORDER — BUDESONIDE 0.5 MG/2ML
0.5 INHALANT ORAL 2 TIMES DAILY
Status: DISCONTINUED | OUTPATIENT
Start: 2019-01-01 | End: 2019-01-01 | Stop reason: HOSPADM

## 2019-01-01 RX ORDER — VANCOMYCIN HYDROCHLORIDE 1 G/200ML
20 INJECTION, SOLUTION INTRAVENOUS ONCE
Status: COMPLETED | OUTPATIENT
Start: 2019-01-01 | End: 2019-01-01

## 2019-01-01 RX ORDER — NALOXONE HCL 0.4 MG/ML
0.1 VIAL (ML) INJECTION
Status: DISCONTINUED | OUTPATIENT
Start: 2019-01-01 | End: 2019-01-01

## 2019-01-01 RX ORDER — MAGNESIUM SULFATE 1 G/100ML
1 INJECTION INTRAVENOUS
Status: COMPLETED | OUTPATIENT
Start: 2019-01-01 | End: 2019-01-01

## 2019-01-01 RX ORDER — SODIUM CHLORIDE, SODIUM LACTATE, POTASSIUM CHLORIDE, CALCIUM CHLORIDE 600; 310; 30; 20 MG/100ML; MG/100ML; MG/100ML; MG/100ML
9 INJECTION, SOLUTION INTRAVENOUS CONTINUOUS
Status: DISCONTINUED | OUTPATIENT
Start: 2019-01-01 | End: 2019-01-01 | Stop reason: HOSPADM

## 2019-01-01 RX ORDER — DIPHENHYDRAMINE HYDROCHLORIDE 50 MG/ML
25 INJECTION INTRAMUSCULAR; INTRAVENOUS EVERY 6 HOURS PRN
Status: CANCELLED | OUTPATIENT
Start: 2019-01-01

## 2019-01-01 RX ORDER — SODIUM CHLORIDE 9 MG/ML
125 INJECTION, SOLUTION INTRAVENOUS CONTINUOUS
Status: DISCONTINUED | OUTPATIENT
Start: 2019-01-01 | End: 2019-01-01

## 2019-01-01 RX ORDER — HYDROCODONE BITARTRATE AND ACETAMINOPHEN 5; 325 MG/1; MG/1
2 TABLET ORAL EVERY 4 HOURS PRN
Status: DISCONTINUED | OUTPATIENT
Start: 2019-01-01 | End: 2019-01-01 | Stop reason: HOSPADM

## 2019-01-01 RX ORDER — ONDANSETRON 2 MG/ML
4 INJECTION INTRAMUSCULAR; INTRAVENOUS EVERY 6 HOURS PRN
Status: DISCONTINUED | OUTPATIENT
Start: 2019-01-01 | End: 2019-01-01

## 2019-01-01 RX ORDER — ROFLUMILAST 500 UG/1
500 TABLET ORAL DAILY
COMMUNITY

## 2019-01-01 RX ORDER — CEPHALEXIN 500 MG/1
500 CAPSULE ORAL EVERY 12 HOURS SCHEDULED
Status: DISCONTINUED | OUTPATIENT
Start: 2019-01-01 | End: 2019-01-01

## 2019-01-01 RX ORDER — ONDANSETRON 2 MG/ML
4 INJECTION INTRAMUSCULAR; INTRAVENOUS ONCE
Status: COMPLETED | OUTPATIENT
Start: 2019-01-01 | End: 2019-01-01

## 2019-01-01 RX ORDER — MORPHINE SULFATE 2 MG/ML
4 INJECTION, SOLUTION INTRAMUSCULAR; INTRAVENOUS
Status: DISCONTINUED | OUTPATIENT
Start: 2019-01-01 | End: 2019-01-01 | Stop reason: HOSPADM

## 2019-01-01 RX ORDER — DEXTROSE MONOHYDRATE 25 G/50ML
25 INJECTION, SOLUTION INTRAVENOUS
Status: DISCONTINUED | OUTPATIENT
Start: 2019-01-01 | End: 2019-01-01

## 2019-01-01 RX ORDER — ONDANSETRON 2 MG/ML
4 INJECTION INTRAMUSCULAR; INTRAVENOUS ONCE AS NEEDED
Status: DISCONTINUED | OUTPATIENT
Start: 2019-01-01 | End: 2019-01-01 | Stop reason: HOSPADM

## 2019-01-01 RX ORDER — SUCRALFATE 1 G/10ML
1 SUSPENSION ORAL
COMMUNITY
Start: 2019-01-01

## 2019-01-01 RX ORDER — LOSARTAN POTASSIUM 25 MG/1
25 TABLET ORAL DAILY
Status: DISCONTINUED | OUTPATIENT
Start: 2019-01-01 | End: 2019-01-01 | Stop reason: HOSPADM

## 2019-01-01 RX ORDER — ONDANSETRON 2 MG/ML
INJECTION INTRAMUSCULAR; INTRAVENOUS AS NEEDED
Status: DISCONTINUED | OUTPATIENT
Start: 2019-01-01 | End: 2019-01-01 | Stop reason: SURG

## 2019-01-01 RX ORDER — ALBUTEROL SULFATE 2.5 MG/3ML
2.5 SOLUTION RESPIRATORY (INHALATION)
Status: DISCONTINUED | OUTPATIENT
Start: 2019-01-01 | End: 2019-01-01 | Stop reason: HOSPADM

## 2019-01-01 RX ORDER — LORAZEPAM 2 MG/ML
2 CONCENTRATE ORAL
Status: CANCELLED | OUTPATIENT
Start: 2019-01-01 | End: 2019-07-11

## 2019-01-01 RX ORDER — FAMOTIDINE 40 MG/1
40 TABLET, FILM COATED ORAL DAILY
Status: DISCONTINUED | OUTPATIENT
Start: 2019-01-01 | End: 2019-01-01 | Stop reason: HOSPADM

## 2019-01-01 RX ORDER — LABETALOL HYDROCHLORIDE 5 MG/ML
20 INJECTION, SOLUTION INTRAVENOUS EVERY 6 HOURS PRN
Status: DISCONTINUED | OUTPATIENT
Start: 2019-01-01 | End: 2019-01-01 | Stop reason: HOSPADM

## 2019-01-01 RX ORDER — ONDANSETRON 4 MG/1
4 TABLET, FILM COATED ORAL EVERY 6 HOURS PRN
Status: DISCONTINUED | OUTPATIENT
Start: 2019-01-01 | End: 2019-01-01

## 2019-01-01 RX ORDER — PROMETHAZINE HYDROCHLORIDE 25 MG/1
6.25 TABLET ORAL EVERY 4 HOURS PRN
Status: DISCONTINUED | OUTPATIENT
Start: 2019-01-01 | End: 2019-01-01 | Stop reason: HOSPADM

## 2019-01-01 RX ORDER — GLYCOPYRROLATE 0.2 MG/ML
0.2 INJECTION INTRAMUSCULAR; INTRAVENOUS
Status: CANCELLED | OUTPATIENT
Start: 2019-01-01

## 2019-01-01 RX ORDER — FUROSEMIDE 40 MG/1
40 TABLET ORAL DAILY
Status: DISCONTINUED | OUTPATIENT
Start: 2019-01-01 | End: 2019-01-01 | Stop reason: HOSPADM

## 2019-01-01 RX ORDER — MIDAZOLAM HYDROCHLORIDE 1 MG/ML
2 INJECTION INTRAMUSCULAR; INTRAVENOUS
Status: DISCONTINUED | OUTPATIENT
Start: 2019-01-01 | End: 2019-01-01 | Stop reason: HOSPADM

## 2019-01-01 RX ORDER — CEFDINIR 300 MG/1
300 CAPSULE ORAL 2 TIMES DAILY
Qty: 4 CAPSULE | Refills: 0 | Status: SHIPPED | OUTPATIENT
Start: 2019-01-01 | End: 2019-01-01

## 2019-01-01 RX ORDER — LORAZEPAM 2 MG/ML
2 INJECTION INTRAMUSCULAR
Status: DISCONTINUED | OUTPATIENT
Start: 2019-01-01 | End: 2019-01-01 | Stop reason: HOSPADM

## 2019-01-01 RX ORDER — HALOPERIDOL 1 MG/1
1 TABLET ORAL EVERY 4 HOURS PRN
Status: CANCELLED | OUTPATIENT
Start: 2019-01-01

## 2019-01-01 RX ORDER — DEXMEDETOMIDINE HYDROCHLORIDE 4 UG/ML
.2-1.5 INJECTION, SOLUTION INTRAVENOUS CONTINUOUS
Status: DISCONTINUED | OUTPATIENT
Start: 2019-01-01 | End: 2019-01-01

## 2019-01-01 RX ORDER — IPRATROPIUM BROMIDE AND ALBUTEROL SULFATE 2.5; .5 MG/3ML; MG/3ML
3 SOLUTION RESPIRATORY (INHALATION) EVERY 4 HOURS PRN
Status: DISCONTINUED | OUTPATIENT
Start: 2019-01-01 | End: 2019-01-01 | Stop reason: HOSPADM

## 2019-01-01 RX ORDER — FUROSEMIDE 40 MG/1
40 TABLET ORAL DAILY
COMMUNITY
Start: 2018-05-02

## 2019-01-01 RX ORDER — DILTIAZEM HYDROCHLORIDE 5 MG/ML
10 INJECTION INTRAVENOUS ONCE
Status: COMPLETED | OUTPATIENT
Start: 2019-01-01 | End: 2019-01-01

## 2019-01-01 RX ORDER — MORPHINE SULFATE 2 MG/ML
2 INJECTION, SOLUTION INTRAMUSCULAR; INTRAVENOUS ONCE
Status: COMPLETED | OUTPATIENT
Start: 2019-01-01 | End: 2019-01-01

## 2019-01-01 RX ORDER — PREDNISONE 20 MG/1
20 TABLET ORAL DAILY
Status: DISCONTINUED | OUTPATIENT
Start: 2019-01-01 | End: 2019-01-01 | Stop reason: HOSPADM

## 2019-01-01 RX ORDER — FENTANYL CITRATE 50 UG/ML
25 INJECTION, SOLUTION INTRAMUSCULAR; INTRAVENOUS
Status: DISCONTINUED | OUTPATIENT
Start: 2019-01-01 | End: 2019-01-01 | Stop reason: HOSPADM

## 2019-01-01 RX ORDER — GUAIFENESIN 600 MG/1
1200 TABLET, EXTENDED RELEASE ORAL EVERY 12 HOURS SCHEDULED
Status: DISCONTINUED | OUTPATIENT
Start: 2019-01-01 | End: 2019-01-01 | Stop reason: HOSPADM

## 2019-01-01 RX ORDER — SERTRALINE HYDROCHLORIDE 100 MG/1
900 TABLET, FILM COATED ORAL DAILY
Status: DISCONTINUED | OUTPATIENT
Start: 2019-01-01 | End: 2019-01-01 | Stop reason: HOSPADM

## 2019-01-01 RX ORDER — HYDROCODONE BITARTRATE AND ACETAMINOPHEN 5; 325 MG/1; MG/1
1 TABLET ORAL EVERY 4 HOURS PRN
Status: DISCONTINUED | OUTPATIENT
Start: 2019-01-01 | End: 2019-01-01 | Stop reason: HOSPADM

## 2019-01-01 RX ORDER — HYDROMORPHONE HYDROCHLORIDE 1 MG/ML
0.25 INJECTION, SOLUTION INTRAMUSCULAR; INTRAVENOUS; SUBCUTANEOUS EVERY 4 HOURS PRN
Status: DISCONTINUED | OUTPATIENT
Start: 2019-01-01 | End: 2019-01-01 | Stop reason: HOSPADM

## 2019-01-01 RX ORDER — PREDNISONE 10 MG/1
10 TABLET ORAL DAILY
Status: DISCONTINUED | OUTPATIENT
Start: 2019-01-01 | End: 2019-01-01

## 2019-01-01 RX ORDER — IPRATROPIUM BROMIDE AND ALBUTEROL SULFATE 2.5; .5 MG/3ML; MG/3ML
3 SOLUTION RESPIRATORY (INHALATION) EVERY 6 HOURS PRN
Status: DISCONTINUED | OUTPATIENT
Start: 2019-01-01 | End: 2019-01-01 | Stop reason: HOSPADM

## 2019-01-01 RX ORDER — FAMOTIDINE 10 MG/ML
20 INJECTION, SOLUTION INTRAVENOUS ONCE
Status: COMPLETED | OUTPATIENT
Start: 2019-01-01 | End: 2019-01-01

## 2019-01-01 RX ORDER — PROMETHAZINE HYDROCHLORIDE 12.5 MG/1
6.25 SUPPOSITORY RECTAL EVERY 4 HOURS PRN
Status: DISCONTINUED | OUTPATIENT
Start: 2019-01-01 | End: 2019-01-01 | Stop reason: HOSPADM

## 2019-01-01 RX ORDER — HALOPERIDOL 5 MG/ML
1 INJECTION INTRAMUSCULAR EVERY 4 HOURS PRN
Status: DISCONTINUED | OUTPATIENT
Start: 2019-01-01 | End: 2019-01-01 | Stop reason: HOSPADM

## 2019-01-01 RX ORDER — SCOLOPAMINE TRANSDERMAL SYSTEM 1 MG/1
1 PATCH, EXTENDED RELEASE TRANSDERMAL
Status: DISCONTINUED | OUTPATIENT
Start: 2019-01-01 | End: 2019-01-01 | Stop reason: HOSPADM

## 2019-01-01 RX ORDER — CHLORHEXIDINE GLUCONATE 0.12 MG/ML
15 RINSE ORAL EVERY 12 HOURS SCHEDULED
Status: DISCONTINUED | OUTPATIENT
Start: 2019-01-01 | End: 2019-01-01

## 2019-01-01 RX ORDER — MORPHINE SULFATE 2 MG/ML
2 INJECTION, SOLUTION INTRAMUSCULAR; INTRAVENOUS
Status: CANCELLED | OUTPATIENT
Start: 2019-01-01 | End: 2019-07-11

## 2019-01-01 RX ORDER — OXYCODONE AND ACETAMINOPHEN 7.5; 325 MG/1; MG/1
1 TABLET ORAL ONCE AS NEEDED
Status: DISCONTINUED | OUTPATIENT
Start: 2019-01-01 | End: 2019-01-01 | Stop reason: HOSPADM

## 2019-01-01 RX ORDER — HYDROMORPHONE HYDROCHLORIDE 1 MG/ML
0.5 INJECTION, SOLUTION INTRAMUSCULAR; INTRAVENOUS; SUBCUTANEOUS
Status: DISCONTINUED | OUTPATIENT
Start: 2019-01-01 | End: 2019-01-01 | Stop reason: HOSPADM

## 2019-01-01 RX ORDER — ACETAMINOPHEN 325 MG/1
325 TABLET ORAL EVERY 4 HOURS PRN
Status: DISCONTINUED | OUTPATIENT
Start: 2019-01-01 | End: 2019-01-01 | Stop reason: HOSPADM

## 2019-01-01 RX ORDER — SODIUM CHLORIDE 0.9 % (FLUSH) 0.9 %
1-10 SYRINGE (ML) INJECTION AS NEEDED
Status: DISCONTINUED | OUTPATIENT
Start: 2019-01-01 | End: 2019-01-01 | Stop reason: HOSPADM

## 2019-01-01 RX ORDER — PANTOPRAZOLE SODIUM 40 MG/1
40 TABLET, DELAYED RELEASE ORAL DAILY
Status: DISCONTINUED | OUTPATIENT
Start: 2019-01-01 | End: 2019-01-01

## 2019-01-01 RX ORDER — MORPHINE SULFATE 10 MG/ML
6 INJECTION INTRAMUSCULAR; INTRAVENOUS; SUBCUTANEOUS
Status: CANCELLED | OUTPATIENT
Start: 2019-01-01 | End: 2019-07-11

## 2019-01-01 RX ORDER — HALOPERIDOL 1 MG/1
1 TABLET ORAL EVERY 4 HOURS PRN
Status: DISCONTINUED | OUTPATIENT
Start: 2019-01-01 | End: 2019-01-01 | Stop reason: HOSPADM

## 2019-01-01 RX ORDER — IPRATROPIUM BROMIDE AND ALBUTEROL SULFATE 2.5; .5 MG/3ML; MG/3ML
3 SOLUTION RESPIRATORY (INHALATION)
Status: DISCONTINUED | OUTPATIENT
Start: 2019-01-01 | End: 2019-01-01 | Stop reason: HOSPADM

## 2019-01-01 RX ORDER — CARVEDILOL 6.25 MG/1
6.25 TABLET ORAL 2 TIMES DAILY
Status: DISCONTINUED | OUTPATIENT
Start: 2019-01-01 | End: 2019-01-01

## 2019-01-01 RX ORDER — POTASSIUM CHLORIDE 750 MG/1
40 CAPSULE, EXTENDED RELEASE ORAL AS NEEDED
Status: DISCONTINUED | OUTPATIENT
Start: 2019-01-01 | End: 2019-01-01 | Stop reason: SDUPTHER

## 2019-01-01 RX ORDER — DIPHENHYDRAMINE HYDROCHLORIDE 50 MG/ML
12.5 INJECTION INTRAMUSCULAR; INTRAVENOUS
Status: DISCONTINUED | OUTPATIENT
Start: 2019-01-01 | End: 2019-01-01 | Stop reason: HOSPADM

## 2019-01-01 RX ORDER — ALBUTEROL SULFATE 2.5 MG/3ML
2.5 SOLUTION RESPIRATORY (INHALATION) EVERY 4 HOURS PRN
Status: DISCONTINUED | OUTPATIENT
Start: 2019-01-01 | End: 2019-01-01 | Stop reason: HOSPADM

## 2019-01-01 RX ORDER — CEFAZOLIN SODIUM 2 G/100ML
2 INJECTION, SOLUTION INTRAVENOUS EVERY 8 HOURS
Status: CANCELLED | OUTPATIENT
Start: 2019-01-01 | End: 2019-01-01

## 2019-01-01 RX ORDER — OXYCODONE HYDROCHLORIDE AND ACETAMINOPHEN 5; 325 MG/1; MG/1
2 TABLET ORAL EVERY 4 HOURS PRN
Status: DISCONTINUED | OUTPATIENT
Start: 2019-01-01 | End: 2019-01-01

## 2019-01-01 RX ORDER — SODIUM CHLORIDE 0.9 % (FLUSH) 0.9 %
3 SYRINGE (ML) INJECTION EVERY 12 HOURS SCHEDULED
Status: DISCONTINUED | OUTPATIENT
Start: 2019-01-01 | End: 2019-01-01

## 2019-01-01 RX ORDER — PROMETHAZINE HYDROCHLORIDE 25 MG/ML
6.25 INJECTION, SOLUTION INTRAMUSCULAR; INTRAVENOUS EVERY 4 HOURS PRN
Status: CANCELLED | OUTPATIENT
Start: 2019-01-01

## 2019-01-01 RX ORDER — VANCOMYCIN HYDROCHLORIDE 1 G/200ML
1000 INJECTION, SOLUTION INTRAVENOUS EVERY 24 HOURS
Status: DISCONTINUED | OUTPATIENT
Start: 2019-01-01 | End: 2019-01-01

## 2019-01-01 RX ORDER — ALBUTEROL SULFATE 2.5 MG/3ML
2.5 SOLUTION RESPIRATORY (INHALATION) EVERY 4 HOURS PRN
Status: DISCONTINUED | OUTPATIENT
Start: 2019-01-01 | End: 2019-01-01

## 2019-01-01 RX ORDER — ALBUTEROL SULFATE 90 UG/1
2 AEROSOL, METERED RESPIRATORY (INHALATION) EVERY 4 HOURS PRN
COMMUNITY

## 2019-01-01 RX ORDER — ATORVASTATIN CALCIUM 10 MG/1
10 TABLET, FILM COATED ORAL EVERY EVENING
COMMUNITY
Start: 2017-01-06

## 2019-01-01 RX ORDER — MORPHINE SULFATE 20 MG/ML
5 SOLUTION ORAL
Status: CANCELLED | OUTPATIENT
Start: 2019-01-01 | End: 2019-07-11

## 2019-01-01 RX ORDER — LORAZEPAM 2 MG/ML
0.5 INJECTION INTRAMUSCULAR
Status: DISCONTINUED | OUTPATIENT
Start: 2019-01-01 | End: 2019-01-01 | Stop reason: HOSPADM

## 2019-01-01 RX ORDER — ALPRAZOLAM 0.25 MG/1
0.25 TABLET ORAL 3 TIMES DAILY PRN
Status: DISCONTINUED | OUTPATIENT
Start: 2019-01-01 | End: 2019-01-01 | Stop reason: HOSPADM

## 2019-01-01 RX ORDER — IPRATROPIUM BROMIDE AND ALBUTEROL SULFATE 2.5; .5 MG/3ML; MG/3ML
SOLUTION RESPIRATORY (INHALATION)
Status: COMPLETED
Start: 2019-01-01 | End: 2019-01-01

## 2019-01-01 RX ORDER — VANCOMYCIN HYDROCHLORIDE 1 G/200ML
1000 INJECTION, SOLUTION INTRAVENOUS ONCE
Status: COMPLETED | OUTPATIENT
Start: 2019-01-01 | End: 2019-01-01

## 2019-01-01 RX ORDER — ARFORMOTEROL TARTRATE 15 UG/2ML
15 SOLUTION RESPIRATORY (INHALATION)
Status: DISCONTINUED | OUTPATIENT
Start: 2019-01-01 | End: 2019-01-01

## 2019-01-01 RX ORDER — CARVEDILOL 3.12 MG/1
6.25 TABLET ORAL 2 TIMES DAILY
Status: DISCONTINUED | OUTPATIENT
Start: 2019-01-01 | End: 2019-01-01 | Stop reason: HOSPADM

## 2019-01-01 RX ORDER — CEFAZOLIN SODIUM 2 G/100ML
2 INJECTION, SOLUTION INTRAVENOUS ONCE
Status: COMPLETED | OUTPATIENT
Start: 2019-01-01 | End: 2019-01-01

## 2019-01-01 RX ORDER — ROCURONIUM BROMIDE 10 MG/ML
INJECTION, SOLUTION INTRAVENOUS AS NEEDED
Status: DISCONTINUED | OUTPATIENT
Start: 2019-01-01 | End: 2019-01-01 | Stop reason: SURG

## 2019-01-01 RX ORDER — PREDNISONE 20 MG/1
20 TABLET ORAL
Status: DISCONTINUED | OUTPATIENT
Start: 2019-01-01 | End: 2019-01-01 | Stop reason: HOSPADM

## 2019-01-01 RX ORDER — DOCUSATE SODIUM 100 MG/1
100 CAPSULE, LIQUID FILLED ORAL 2 TIMES DAILY PRN
Status: DISCONTINUED | OUTPATIENT
Start: 2019-01-01 | End: 2019-01-01 | Stop reason: HOSPADM

## 2019-01-01 RX ORDER — LORAZEPAM 2 MG/ML
1 INJECTION INTRAMUSCULAR ONCE
Status: COMPLETED | OUTPATIENT
Start: 2019-01-01 | End: 2019-01-01

## 2019-01-01 RX ORDER — ACETYLCYSTEINE 200 MG/ML
4 SOLUTION ORAL; RESPIRATORY (INHALATION)
Status: DISCONTINUED | OUTPATIENT
Start: 2019-01-01 | End: 2019-01-01

## 2019-01-01 RX ORDER — PROMETHAZINE HYDROCHLORIDE 25 MG/ML
6.25 INJECTION, SOLUTION INTRAMUSCULAR; INTRAVENOUS EVERY 4 HOURS PRN
Status: DISCONTINUED | OUTPATIENT
Start: 2019-01-01 | End: 2019-01-01 | Stop reason: HOSPADM

## 2019-01-01 RX ORDER — HALOPERIDOL 2 MG/ML
1 SOLUTION ORAL EVERY 4 HOURS PRN
Status: DISCONTINUED | OUTPATIENT
Start: 2019-01-01 | End: 2019-01-01 | Stop reason: HOSPADM

## 2019-01-01 RX ORDER — PROMETHAZINE HYDROCHLORIDE 25 MG/1
6.25 TABLET ORAL EVERY 4 HOURS PRN
Status: CANCELLED | OUTPATIENT
Start: 2019-01-01

## 2019-01-01 RX ORDER — SENNA AND DOCUSATE SODIUM 50; 8.6 MG/1; MG/1
2 TABLET, FILM COATED ORAL NIGHTLY
Status: DISCONTINUED | OUTPATIENT
Start: 2019-01-01 | End: 2019-01-01 | Stop reason: HOSPADM

## 2019-01-01 RX ORDER — BISACODYL 5 MG/1
10 TABLET, DELAYED RELEASE ORAL DAILY PRN
Status: DISCONTINUED | OUTPATIENT
Start: 2019-01-01 | End: 2019-01-01 | Stop reason: HOSPADM

## 2019-01-01 RX ORDER — POTASSIUM CHLORIDE 750 MG/1
20 CAPSULE, EXTENDED RELEASE ORAL 2 TIMES DAILY
Status: DISCONTINUED | OUTPATIENT
Start: 2019-01-01 | End: 2019-01-01 | Stop reason: HOSPADM

## 2019-01-01 RX ORDER — NALOXONE HCL 0.4 MG/ML
0.4 VIAL (ML) INJECTION AS NEEDED
Status: DISCONTINUED | OUTPATIENT
Start: 2019-01-01 | End: 2019-01-01 | Stop reason: HOSPADM

## 2019-01-01 RX ORDER — METRONIDAZOLE 500 MG/1
500 TABLET ORAL EVERY 12 HOURS SCHEDULED
Status: DISCONTINUED | OUTPATIENT
Start: 2019-01-01 | End: 2019-01-01 | Stop reason: HOSPADM

## 2019-01-01 RX ORDER — MORPHINE SULFATE 2 MG/ML
2 INJECTION, SOLUTION INTRAMUSCULAR; INTRAVENOUS
Status: DISCONTINUED | OUTPATIENT
Start: 2019-01-01 | End: 2019-01-01 | Stop reason: HOSPADM

## 2019-01-01 RX ORDER — VANCOMYCIN HYDROCHLORIDE 1 G/20ML
INJECTION, POWDER, LYOPHILIZED, FOR SOLUTION INTRAVENOUS AS NEEDED
Status: DISCONTINUED | OUTPATIENT
Start: 2019-01-01 | End: 2019-01-01 | Stop reason: SURG

## 2019-01-01 RX ORDER — HYDROCODONE BITARTRATE AND ACETAMINOPHEN 7.5; 325 MG/1; MG/1
1-2 TABLET ORAL EVERY 4 HOURS PRN
Qty: 60 TABLET | Refills: 0 | Status: SHIPPED | OUTPATIENT
Start: 2019-01-01 | End: 2019-01-01

## 2019-01-01 RX ORDER — DICYCLOMINE HYDROCHLORIDE 20 MG/1
80 TABLET ORAL
Qty: 120 | Refills: 1 | Status: ACTIVE
Start: 2019-01-01

## 2019-01-01 RX ORDER — NITROGLYCERIN 0.4 MG/1
0.4 TABLET SUBLINGUAL TAKE AS DIRECTED
COMMUNITY
Start: 2018-02-28

## 2019-01-01 RX ORDER — SENNA AND DOCUSATE SODIUM 50; 8.6 MG/1; MG/1
2 TABLET, FILM COATED ORAL NIGHTLY
Status: DISCONTINUED | OUTPATIENT
Start: 2019-01-01 | End: 2019-01-01

## 2019-01-01 RX ORDER — ACETAMINOPHEN 325 MG/1
650 TABLET ORAL ONCE AS NEEDED
Status: DISCONTINUED | OUTPATIENT
Start: 2019-01-01 | End: 2019-01-01 | Stop reason: HOSPADM

## 2019-01-01 RX ORDER — BUSPIRONE HYDROCHLORIDE 10 MG/1
10 TABLET ORAL EVERY 12 HOURS SCHEDULED
Status: DISCONTINUED | OUTPATIENT
Start: 2019-01-01 | End: 2019-01-01

## 2019-01-01 RX ORDER — HYDROMORPHONE HYDROCHLORIDE 1 MG/ML
1 INJECTION, SOLUTION INTRAMUSCULAR; INTRAVENOUS; SUBCUTANEOUS EVERY 4 HOURS PRN
Status: DISCONTINUED | OUTPATIENT
Start: 2019-01-01 | End: 2019-01-01 | Stop reason: HOSPADM

## 2019-01-01 RX ORDER — FERROUS SULFATE 325(65) MG
325 TABLET ORAL
Status: DISCONTINUED | OUTPATIENT
Start: 2019-01-01 | End: 2019-01-01 | Stop reason: HOSPADM

## 2019-01-01 RX ORDER — NITROGLYCERIN 0.4 MG/1
0.4 TABLET SUBLINGUAL
Status: DISCONTINUED | OUTPATIENT
Start: 2019-01-01 | End: 2019-01-01 | Stop reason: HOSPADM

## 2019-01-01 RX ORDER — BUDESONIDE 0.5 MG/2ML
0.5 INHALANT ORAL
Status: DISCONTINUED | OUTPATIENT
Start: 2019-01-01 | End: 2019-01-01 | Stop reason: HOSPADM

## 2019-01-01 RX ORDER — LORAZEPAM 2 MG/ML
1 CONCENTRATE ORAL
Status: DISCONTINUED | OUTPATIENT
Start: 2019-01-01 | End: 2019-01-01 | Stop reason: HOSPADM

## 2019-01-01 RX ORDER — DILTIAZEM HCL IN NACL,ISO-OSM 125 MG/125
5-15 PLASTIC BAG, INJECTION (ML) INTRAVENOUS
Status: DISCONTINUED | OUTPATIENT
Start: 2019-01-01 | End: 2019-01-01

## 2019-01-01 RX ORDER — LANSOPRAZOLE
30 KIT EVERY MORNING
Status: DISCONTINUED | OUTPATIENT
Start: 2019-01-01 | End: 2019-01-01

## 2019-01-01 RX ORDER — ACETAMINOPHEN 160 MG/5ML
650 SOLUTION ORAL EVERY 4 HOURS PRN
Status: CANCELLED | OUTPATIENT
Start: 2019-01-01

## 2019-01-01 RX ORDER — METHYLPREDNISOLONE SODIUM SUCCINATE 125 MG/2ML
80 INJECTION, POWDER, LYOPHILIZED, FOR SOLUTION INTRAMUSCULAR; INTRAVENOUS EVERY 6 HOURS
Status: DISCONTINUED | OUTPATIENT
Start: 2019-01-01 | End: 2019-01-01

## 2019-01-01 RX ORDER — HYDROCODONE BITARTRATE AND ACETAMINOPHEN 5; 325 MG/1; MG/1
1 TABLET ORAL EVERY 6 HOURS PRN
Status: DISCONTINUED | OUTPATIENT
Start: 2019-01-01 | End: 2019-01-01 | Stop reason: HOSPADM

## 2019-01-01 RX ORDER — LORAZEPAM 2 MG/ML
0.5 INJECTION INTRAMUSCULAR
Status: CANCELLED | OUTPATIENT
Start: 2019-01-01 | End: 2019-07-11

## 2019-01-01 RX ORDER — HYDROCODONE BITARTRATE AND ACETAMINOPHEN 7.5; 325 MG/1; MG/1
2 TABLET ORAL EVERY 4 HOURS PRN
Status: DISCONTINUED | OUTPATIENT
Start: 2019-01-01 | End: 2019-01-01 | Stop reason: HOSPADM

## 2019-01-01 RX ORDER — IPRATROPIUM BROMIDE AND ALBUTEROL SULFATE 2.5; .5 MG/3ML; MG/3ML
3 SOLUTION RESPIRATORY (INHALATION) ONCE
Status: COMPLETED | OUTPATIENT
Start: 2019-01-01 | End: 2019-01-01

## 2019-01-01 RX ORDER — POTASSIUM CHLORIDE 1.5 G/1.77G
40 POWDER, FOR SOLUTION ORAL AS NEEDED
Status: DISCONTINUED | OUTPATIENT
Start: 2019-01-01 | End: 2019-01-01

## 2019-01-01 RX ORDER — HYDROMORPHONE HYDROCHLORIDE 1 MG/ML
0.5 INJECTION, SOLUTION INTRAMUSCULAR; INTRAVENOUS; SUBCUTANEOUS
Status: CANCELLED | OUTPATIENT
Start: 2019-01-01 | End: 2019-07-11

## 2019-01-01 RX ORDER — LOSARTAN POTASSIUM 50 MG/1
25 TABLET ORAL DAILY
Start: 2019-01-01

## 2019-01-01 RX ORDER — ALPRAZOLAM 0.5 MG/1
0.5 TABLET ORAL 3 TIMES DAILY PRN
Status: DISCONTINUED | OUTPATIENT
Start: 2019-01-01 | End: 2019-01-01

## 2019-01-01 RX ORDER — ASPIRIN 81 MG/1
81 TABLET ORAL DAILY
Qty: 42 TABLET | Refills: 0 | Status: ON HOLD | OUTPATIENT
Start: 2019-01-01 | End: 2019-01-01 | Stop reason: SDUPTHER

## 2019-01-01 RX ORDER — ACETAMINOPHEN 325 MG/1
650 TABLET ORAL EVERY 4 HOURS PRN
Status: DISCONTINUED | OUTPATIENT
Start: 2019-01-01 | End: 2019-01-01

## 2019-01-01 RX ORDER — MORPHINE SULFATE 10 MG/ML
6 INJECTION INTRAMUSCULAR; INTRAVENOUS; SUBCUTANEOUS
Status: DISCONTINUED | OUTPATIENT
Start: 2019-01-01 | End: 2019-01-01 | Stop reason: HOSPADM

## 2019-01-01 RX ORDER — POTASSIUM CHLORIDE 750 MG/1
10 TABLET, FILM COATED, EXTENDED RELEASE ORAL DAILY
COMMUNITY
Start: 2019-01-01

## 2019-01-01 RX ORDER — POTASSIUM CHLORIDE 1.5 G/1.77G
40 POWDER, FOR SOLUTION ORAL AS NEEDED
Status: DISCONTINUED | OUTPATIENT
Start: 2019-01-01 | End: 2019-01-01 | Stop reason: SDUPTHER

## 2019-01-01 RX ORDER — HYDROCODONE BITARTRATE AND ACETAMINOPHEN 7.5; 325 MG/1; MG/1
1-2 TABLET ORAL EVERY 4 HOURS PRN
Qty: 60 TABLET | Refills: 0 | Status: SHIPPED | OUTPATIENT
Start: 2019-01-01 | End: 2019-01-01 | Stop reason: HOSPADM

## 2019-01-01 RX ORDER — ACETAMINOPHEN 325 MG/1
650 TABLET ORAL EVERY 4 HOURS PRN
Status: CANCELLED | OUTPATIENT
Start: 2019-01-01

## 2019-01-01 RX ORDER — BUDESONIDE 0.5 MG/2ML
0.5 INHALANT ORAL 2 TIMES DAILY
COMMUNITY
Start: 2018-01-01

## 2019-01-01 RX ORDER — GLYCOPYRROLATE 0.2 MG/ML
INJECTION INTRAMUSCULAR; INTRAVENOUS AS NEEDED
Status: DISCONTINUED | OUTPATIENT
Start: 2019-01-01 | End: 2019-01-01 | Stop reason: SURG

## 2019-01-01 RX ORDER — METHYLPREDNISOLONE SODIUM SUCCINATE 125 MG/2ML
60 INJECTION, POWDER, LYOPHILIZED, FOR SOLUTION INTRAMUSCULAR; INTRAVENOUS EVERY 12 HOURS
Status: DISCONTINUED | OUTPATIENT
Start: 2019-01-01 | End: 2019-01-01

## 2019-01-01 RX ORDER — ALBUTEROL SULFATE 90 UG/1
AEROSOL, METERED RESPIRATORY (INHALATION)
Status: COMPLETED
Start: 2019-01-01 | End: 2019-01-01

## 2019-01-01 RX ORDER — DICYCLOMINE HCL 20 MG
20 TABLET ORAL
COMMUNITY
Start: 2019-01-01

## 2019-01-01 RX ORDER — LORAZEPAM 2 MG/ML
0.5 CONCENTRATE ORAL
Status: DISCONTINUED | OUTPATIENT
Start: 2019-01-01 | End: 2019-01-01 | Stop reason: HOSPADM

## 2019-01-01 RX ORDER — AZITHROMYCIN 250 MG/1
250 TABLET, FILM COATED ORAL DAILY
COMMUNITY
Start: 2019-01-01 | End: 2019-01-01 | Stop reason: HOSPADM

## 2019-01-01 RX ORDER — PROPOFOL 10 MG/ML
50 VIAL (ML) INTRAVENOUS ONCE
Status: COMPLETED | OUTPATIENT
Start: 2019-01-01 | End: 2019-01-01

## 2019-01-01 RX ORDER — HYDROMORPHONE HYDROCHLORIDE 1 MG/ML
0.25 INJECTION, SOLUTION INTRAMUSCULAR; INTRAVENOUS; SUBCUTANEOUS EVERY 4 HOURS PRN
Status: DISCONTINUED | OUTPATIENT
Start: 2019-01-01 | End: 2019-01-01

## 2019-01-01 RX ORDER — MORPHINE SULFATE 20 MG/ML
20 SOLUTION ORAL
Status: DISCONTINUED | OUTPATIENT
Start: 2019-01-01 | End: 2019-01-01 | Stop reason: HOSPADM

## 2019-01-01 RX ORDER — POTASSIUM CHLORIDE 750 MG/1
40 CAPSULE, EXTENDED RELEASE ORAL AS NEEDED
Status: DISCONTINUED | OUTPATIENT
Start: 2019-01-01 | End: 2019-01-01

## 2019-01-01 RX ORDER — LORAZEPAM 2 MG/ML
2 CONCENTRATE ORAL
Status: DISCONTINUED | OUTPATIENT
Start: 2019-01-01 | End: 2019-01-01 | Stop reason: HOSPADM

## 2019-01-01 RX ORDER — METHYLPREDNISOLONE SODIUM SUCCINATE 125 MG/2ML
125 INJECTION, POWDER, LYOPHILIZED, FOR SOLUTION INTRAMUSCULAR; INTRAVENOUS ONCE
Status: COMPLETED | OUTPATIENT
Start: 2019-01-01 | End: 2019-01-01

## 2019-01-01 RX ORDER — PANTOPRAZOLE SODIUM 40 MG/1
40 TABLET, DELAYED RELEASE ORAL
Status: DISCONTINUED | OUTPATIENT
Start: 2019-01-01 | End: 2019-01-01 | Stop reason: HOSPADM

## 2019-01-01 RX ORDER — OXYCODONE HYDROCHLORIDE AND ACETAMINOPHEN 5; 325 MG/1; MG/1
1 TABLET ORAL EVERY 4 HOURS PRN
Status: DISCONTINUED | OUTPATIENT
Start: 2019-01-01 | End: 2019-01-01

## 2019-01-01 RX ORDER — ALPRAZOLAM 0.5 MG/1
0.25 TABLET ORAL 3 TIMES DAILY PRN
Status: DISCONTINUED | OUTPATIENT
Start: 2019-01-01 | End: 2019-01-01 | Stop reason: HOSPADM

## 2019-01-01 RX ORDER — NICOTINE POLACRILEX 4 MG
15 LOZENGE BUCCAL
Status: DISCONTINUED | OUTPATIENT
Start: 2019-01-01 | End: 2019-01-01

## 2019-01-01 RX ORDER — SODIUM CHLORIDE 0.9 % (FLUSH) 0.9 %
3-10 SYRINGE (ML) INJECTION AS NEEDED
Status: DISCONTINUED | OUTPATIENT
Start: 2019-01-01 | End: 2019-01-01

## 2019-01-01 RX ORDER — OXYCODONE AND ACETAMINOPHEN 7.5; 325 MG/1; MG/1
1 TABLET ORAL EVERY 4 HOURS PRN
Status: DISCONTINUED | OUTPATIENT
Start: 2019-01-01 | End: 2019-01-01 | Stop reason: HOSPADM

## 2019-01-01 RX ORDER — MORPHINE SULFATE 20 MG/ML
10 SOLUTION ORAL
Status: CANCELLED | OUTPATIENT
Start: 2019-01-01 | End: 2019-07-11

## 2019-01-01 RX ORDER — HYDROCODONE BITARTRATE AND ACETAMINOPHEN 5; 325 MG/1; MG/1
1 TABLET ORAL EVERY 4 HOURS PRN
Qty: 20 TABLET | Refills: 0 | Status: SHIPPED | OUTPATIENT
Start: 2019-01-01 | End: 2019-01-01 | Stop reason: HOSPADM

## 2019-01-01 RX ORDER — ALPRAZOLAM 0.5 MG/1
0.25 TABLET ORAL 3 TIMES DAILY PRN
COMMUNITY
Start: 2019-01-01

## 2019-01-01 RX ORDER — PREDNISONE 20 MG/1
20 TABLET ORAL
Status: DISCONTINUED | OUTPATIENT
Start: 2019-01-01 | End: 2019-01-01

## 2019-01-01 RX ORDER — FENTANYL CITRATE 50 UG/ML
INJECTION, SOLUTION INTRAMUSCULAR; INTRAVENOUS AS NEEDED
Status: DISCONTINUED | OUTPATIENT
Start: 2019-01-01 | End: 2019-01-01 | Stop reason: SURG

## 2019-01-01 RX ORDER — IPRATROPIUM BROMIDE AND ALBUTEROL SULFATE 2.5; .5 MG/3ML; MG/3ML
3 SOLUTION RESPIRATORY (INHALATION) EVERY 6 HOURS PRN
Status: DISCONTINUED | OUTPATIENT
Start: 2019-01-01 | End: 2019-01-01

## 2019-01-01 RX ORDER — SODIUM CHLORIDE FOR INHALATION 7 %
4 VIAL, NEBULIZER (ML) INHALATION
Status: DISCONTINUED | OUTPATIENT
Start: 2019-01-01 | End: 2019-01-01

## 2019-01-01 RX ADMIN — FENTANYL CITRATE 25 MCG: 50 INJECTION INTRAMUSCULAR; INTRAVENOUS at 03:40

## 2019-01-01 RX ADMIN — FENTANYL CITRATE 25 MCG: 50 INJECTION INTRAMUSCULAR; INTRAVENOUS at 18:16

## 2019-01-01 RX ADMIN — GUAIFENESIN 1200 MG: 600 TABLET, EXTENDED RELEASE ORAL at 20:46

## 2019-01-01 RX ADMIN — BUSPIRONE HYDROCHLORIDE 10 MG: 10 TABLET ORAL at 20:14

## 2019-01-01 RX ADMIN — SERTRALINE 50 MG: 50 TABLET, FILM COATED ORAL at 08:07

## 2019-01-01 RX ADMIN — ROFLUMILAST 500 MCG: 500 TABLET ORAL at 08:22

## 2019-01-01 RX ADMIN — CARVEDILOL 6.25 MG: 6.25 TABLET, FILM COATED ORAL at 14:46

## 2019-01-01 RX ADMIN — HYDROCODONE BITARTRATE AND ACETAMINOPHEN 2 TABLET: 7.5; 325 TABLET ORAL at 05:35

## 2019-01-01 RX ADMIN — HYDROCODONE BITARTRATE AND ACETAMINOPHEN 1 TABLET: 7.5; 325 TABLET ORAL at 13:06

## 2019-01-01 RX ADMIN — HYDROCODONE BITARTRATE AND ACETAMINOPHEN 1 TABLET: 7.5; 325 TABLET ORAL at 02:40

## 2019-01-01 RX ADMIN — FENTANYL CITRATE 25 MCG: 50 INJECTION INTRAMUSCULAR; INTRAVENOUS at 21:53

## 2019-01-01 RX ADMIN — PANTOPRAZOLE SODIUM 40 MG: 40 TABLET, DELAYED RELEASE ORAL at 09:35

## 2019-01-01 RX ADMIN — ALBUTEROL SULFATE 6 PUFF: 90 AEROSOL, METERED RESPIRATORY (INHALATION) at 06:48

## 2019-01-01 RX ADMIN — GUAIFENESIN 1200 MG: 600 TABLET, EXTENDED RELEASE ORAL at 20:38

## 2019-01-01 RX ADMIN — POTASSIUM CHLORIDE 40 MEQ: 750 CAPSULE, EXTENDED RELEASE ORAL at 06:48

## 2019-01-01 RX ADMIN — ARFORMOTEROL TARTRATE 15 MCG: 15 SOLUTION RESPIRATORY (INHALATION) at 07:30

## 2019-01-01 RX ADMIN — BUSPIRONE HYDROCHLORIDE 10 MG: 10 TABLET ORAL at 21:23

## 2019-01-01 RX ADMIN — PANTOPRAZOLE SODIUM 40 MG: 40 TABLET, DELAYED RELEASE ORAL at 06:07

## 2019-01-01 RX ADMIN — FUROSEMIDE 40 MG: 40 TABLET ORAL at 08:03

## 2019-01-01 RX ADMIN — BUSPIRONE HYDROCHLORIDE 10 MG: 10 TABLET ORAL at 08:34

## 2019-01-01 RX ADMIN — GLYCOPYRROLATE 0.4 MG: 0.2 INJECTION INTRAMUSCULAR; INTRAVENOUS at 12:30

## 2019-01-01 RX ADMIN — FUROSEMIDE 40 MG: 40 TABLET ORAL at 14:04

## 2019-01-01 RX ADMIN — CARVEDILOL 6.25 MG: 6.25 TABLET, FILM COATED ORAL at 08:15

## 2019-01-01 RX ADMIN — BUDESONIDE 0.5 MG: 0.5 INHALANT RESPIRATORY (INHALATION) at 08:49

## 2019-01-01 RX ADMIN — ARFORMOTEROL TARTRATE 15 MCG: 15 SOLUTION RESPIRATORY (INHALATION) at 08:49

## 2019-01-01 RX ADMIN — FERROUS SULFATE TAB 325 MG (65 MG ELEMENTAL FE) 325 MG: 325 (65 FE) TAB at 08:09

## 2019-01-01 RX ADMIN — ATORVASTATIN CALCIUM 10 MG: 10 TABLET, FILM COATED ORAL at 20:29

## 2019-01-01 RX ADMIN — SUCRALFATE 1 G: 1 SUSPENSION ORAL at 20:14

## 2019-01-01 RX ADMIN — BUSPIRONE HYDROCHLORIDE 10 MG: 10 TABLET ORAL at 20:08

## 2019-01-01 RX ADMIN — CEFEPIME HYDROCHLORIDE 2 G: 2 INJECTION, POWDER, FOR SOLUTION INTRAVENOUS at 05:21

## 2019-01-01 RX ADMIN — FUROSEMIDE 40 MG: 40 TABLET ORAL at 08:06

## 2019-01-01 RX ADMIN — BUSPIRONE HYDROCHLORIDE 10 MG: 10 TABLET ORAL at 08:20

## 2019-01-01 RX ADMIN — HYDROMORPHONE HYDROCHLORIDE 0.5 MG: 1 INJECTION, SOLUTION INTRAMUSCULAR; INTRAVENOUS; SUBCUTANEOUS at 20:47

## 2019-01-01 RX ADMIN — GUAIFENESIN 1200 MG: 600 TABLET, EXTENDED RELEASE ORAL at 21:22

## 2019-01-01 RX ADMIN — IPRATROPIUM BROMIDE AND ALBUTEROL SULFATE 3 ML: 2.5; .5 SOLUTION RESPIRATORY (INHALATION) at 23:30

## 2019-01-01 RX ADMIN — HYDROCODONE BITARTRATE AND ACETAMINOPHEN 1 TABLET: 5; 325 TABLET ORAL at 17:03

## 2019-01-01 RX ADMIN — BUSPIRONE HYDROCHLORIDE 10 MG: 10 TABLET ORAL at 20:04

## 2019-01-01 RX ADMIN — PROPOFOL 40 MCG/KG/MIN: 10 INJECTION, EMULSION INTRAVENOUS at 03:26

## 2019-01-01 RX ADMIN — BUDESONIDE 0.5 MG: 0.5 INHALANT RESPIRATORY (INHALATION) at 19:47

## 2019-01-01 RX ADMIN — GUAIFENESIN 1200 MG: 600 TABLET, EXTENDED RELEASE ORAL at 09:35

## 2019-01-01 RX ADMIN — SERTRALINE 50 MG: 50 TABLET, FILM COATED ORAL at 14:46

## 2019-01-01 RX ADMIN — ROFLUMILAST 500 MCG: 500 TABLET ORAL at 08:09

## 2019-01-01 RX ADMIN — ALPRAZOLAM 0.25 MG: 0.25 TABLET ORAL at 22:34

## 2019-01-01 RX ADMIN — BUDESONIDE 0.5 MG: 0.5 INHALANT RESPIRATORY (INHALATION) at 07:29

## 2019-01-01 RX ADMIN — BUSPIRONE HYDROCHLORIDE 10 MG: 10 TABLET ORAL at 08:07

## 2019-01-01 RX ADMIN — IPRATROPIUM BROMIDE AND ALBUTEROL SULFATE 3 ML: 2.5; .5 SOLUTION RESPIRATORY (INHALATION) at 18:50

## 2019-01-01 RX ADMIN — SERTRALINE 50 MG: 50 TABLET, FILM COATED ORAL at 08:08

## 2019-01-01 RX ADMIN — POTASSIUM CHLORIDE 10 MEQ: 7.46 INJECTION, SOLUTION INTRAVENOUS at 02:25

## 2019-01-01 RX ADMIN — POTASSIUM CHLORIDE 20 MEQ: 750 CAPSULE, EXTENDED RELEASE ORAL at 08:05

## 2019-01-01 RX ADMIN — FERROUS SULFATE TAB 325 MG (65 MG ELEMENTAL FE) 325 MG: 325 (65 FE) TAB at 08:43

## 2019-01-01 RX ADMIN — HYDROCODONE BITARTRATE AND ACETAMINOPHEN 2 TABLET: 7.5; 325 TABLET ORAL at 20:13

## 2019-01-01 RX ADMIN — MORPHINE SULFATE 4 MG: 2 INJECTION, SOLUTION INTRAMUSCULAR; INTRAVENOUS at 16:45

## 2019-01-01 RX ADMIN — ENOXAPARIN SODIUM 50 MG: 60 INJECTION SUBCUTANEOUS at 20:17

## 2019-01-01 RX ADMIN — ALBUTEROL SULFATE 6 PUFF: 90 AEROSOL, METERED RESPIRATORY (INHALATION) at 04:21

## 2019-01-01 RX ADMIN — PROPOFOL 30 MCG/KG/MIN: 10 INJECTION, EMULSION INTRAVENOUS at 14:02

## 2019-01-01 RX ADMIN — METHYLPREDNISOLONE SODIUM SUCCINATE 40 MG: 40 INJECTION, POWDER, FOR SOLUTION INTRAMUSCULAR; INTRAVENOUS at 17:02

## 2019-01-01 RX ADMIN — FUROSEMIDE 40 MG: 40 TABLET ORAL at 10:19

## 2019-01-01 RX ADMIN — KETOROLAC TROMETHAMINE 15 MG: 30 INJECTION, SOLUTION INTRAMUSCULAR at 14:11

## 2019-01-01 RX ADMIN — FENTANYL CITRATE 25 MCG: 50 INJECTION INTRAMUSCULAR; INTRAVENOUS at 22:46

## 2019-01-01 RX ADMIN — DICYCLOMINE HYDROCHLORIDE 10 MG: 10 CAPSULE ORAL at 08:19

## 2019-01-01 RX ADMIN — ALPRAZOLAM 0.5 MG: 0.5 TABLET ORAL at 08:34

## 2019-01-01 RX ADMIN — POTASSIUM CHLORIDE 20 MEQ: 750 CAPSULE, EXTENDED RELEASE ORAL at 08:59

## 2019-01-01 RX ADMIN — CARVEDILOL 6.25 MG: 3.12 TABLET, FILM COATED ORAL at 07:38

## 2019-01-01 RX ADMIN — FUROSEMIDE 40 MG: 40 TABLET ORAL at 08:36

## 2019-01-01 RX ADMIN — BUSPIRONE HYDROCHLORIDE 10 MG: 10 TABLET ORAL at 20:29

## 2019-01-01 RX ADMIN — VANCOMYCIN HYDROCHLORIDE 1 G: 1 INJECTION, POWDER, LYOPHILIZED, FOR SOLUTION INTRAVENOUS at 16:35

## 2019-01-01 RX ADMIN — CARVEDILOL 6.25 MG: 6.25 TABLET, FILM COATED ORAL at 00:42

## 2019-01-01 RX ADMIN — BUDESONIDE 0.5 MG: 0.5 INHALANT RESPIRATORY (INHALATION) at 06:52

## 2019-01-01 RX ADMIN — VANCOMYCIN HYDROCHLORIDE 1250 MG: 10 INJECTION, POWDER, LYOPHILIZED, FOR SOLUTION INTRAVENOUS at 15:41

## 2019-01-01 RX ADMIN — ALPRAZOLAM 0.25 MG: 0.25 TABLET ORAL at 11:07

## 2019-01-01 RX ADMIN — SERTRALINE 50 MG: 50 TABLET, FILM COATED ORAL at 08:34

## 2019-01-01 RX ADMIN — PHENYLEPHRINE HYDROCHLORIDE 100 MCG: 10 INJECTION INTRAVENOUS at 08:28

## 2019-01-01 RX ADMIN — HYDROCODONE BITARTRATE AND ACETAMINOPHEN 1 TABLET: 7.5; 325 TABLET ORAL at 08:28

## 2019-01-01 RX ADMIN — TIOTROPIUM BROMIDE INHALATION SPRAY 2 PUFF: 3.12 SPRAY, METERED RESPIRATORY (INHALATION) at 07:30

## 2019-01-01 RX ADMIN — IPRATROPIUM BROMIDE AND ALBUTEROL SULFATE 3 ML: 2.5; .5 SOLUTION RESPIRATORY (INHALATION) at 03:45

## 2019-01-01 RX ADMIN — BUSPIRONE HYDROCHLORIDE 10 MG: 10 TABLET ORAL at 08:06

## 2019-01-01 RX ADMIN — PREDNISONE 20 MG: 20 TABLET ORAL at 12:15

## 2019-01-01 RX ADMIN — FERROUS SULFATE TAB 325 MG (65 MG ELEMENTAL FE) 325 MG: 325 (65 FE) TAB at 08:22

## 2019-01-01 RX ADMIN — CEFAZOLIN SODIUM 2 G: 2 INJECTION, SOLUTION INTRAVENOUS at 16:09

## 2019-01-01 RX ADMIN — POTASSIUM CHLORIDE 40 MEQ: 750 CAPSULE, EXTENDED RELEASE ORAL at 08:27

## 2019-01-01 RX ADMIN — KETOROLAC TROMETHAMINE 15 MG: 30 INJECTION, SOLUTION INTRAMUSCULAR at 19:35

## 2019-01-01 RX ADMIN — DEXMEDETOMIDINE HYDROCHLORIDE 0.5 MCG/KG/HR: 100 INJECTION, SOLUTION, CONCENTRATE INTRAVENOUS at 06:02

## 2019-01-01 RX ADMIN — METHYLPREDNISOLONE SODIUM SUCCINATE 40 MG: 125 INJECTION, POWDER, FOR SOLUTION INTRAMUSCULAR; INTRAVENOUS at 16:29

## 2019-01-01 RX ADMIN — ALBUTEROL SULFATE 6 PUFF: 90 AEROSOL, METERED RESPIRATORY (INHALATION) at 23:26

## 2019-01-01 RX ADMIN — GUAIFENESIN 1200 MG: 600 TABLET, EXTENDED RELEASE ORAL at 08:06

## 2019-01-01 RX ADMIN — HYDROCODONE BITARTRATE AND ACETAMINOPHEN 1 TABLET: 7.5; 325 TABLET ORAL at 12:11

## 2019-01-01 RX ADMIN — ROFLUMILAST 500 MCG: 500 TABLET ORAL at 08:04

## 2019-01-01 RX ADMIN — ALBUTEROL SULFATE 2.5 MG: 2.5 SOLUTION RESPIRATORY (INHALATION) at 17:13

## 2019-01-01 RX ADMIN — HYDROMORPHONE HYDROCHLORIDE 0.5 MG: 1 INJECTION, SOLUTION INTRAMUSCULAR; INTRAVENOUS; SUBCUTANEOUS at 10:43

## 2019-01-01 RX ADMIN — LOPERAMIDE HYDROCHLORIDE 2 MG: 2 CAPSULE ORAL at 12:35

## 2019-01-01 RX ADMIN — ALBUTEROL SULFATE 2.5 MG: 2.5 SOLUTION RESPIRATORY (INHALATION) at 07:37

## 2019-01-01 RX ADMIN — ACETYLCYSTEINE 4 ML: 200 SOLUTION ORAL; RESPIRATORY (INHALATION) at 06:34

## 2019-01-01 RX ADMIN — AZITHROMYCIN MONOHYDRATE 500 MG: 500 INJECTION, POWDER, LYOPHILIZED, FOR SOLUTION INTRAVENOUS at 10:32

## 2019-01-01 RX ADMIN — HYDROCODONE BITARTRATE AND ACETAMINOPHEN 1 TABLET: 7.5; 325 TABLET ORAL at 07:42

## 2019-01-01 RX ADMIN — CARVEDILOL 6.25 MG: 6.25 TABLET, FILM COATED ORAL at 21:28

## 2019-01-01 RX ADMIN — SERTRALINE 50 MG: 50 TABLET, FILM COATED ORAL at 09:34

## 2019-01-01 RX ADMIN — BUSPIRONE HYDROCHLORIDE 10 MG: 10 TABLET ORAL at 20:46

## 2019-01-01 RX ADMIN — PROPOFOL 35 MCG/KG/MIN: 10 INJECTION, EMULSION INTRAVENOUS at 20:26

## 2019-01-01 RX ADMIN — ALBUTEROL SULFATE 2.5 MG: 2.5 SOLUTION RESPIRATORY (INHALATION) at 22:32

## 2019-01-01 RX ADMIN — GUAIFENESIN 1200 MG: 600 TABLET, EXTENDED RELEASE ORAL at 21:03

## 2019-01-01 RX ADMIN — PHENYLEPHRINE HYDROCHLORIDE 100 MCG: 10 INJECTION INTRAVENOUS at 16:43

## 2019-01-01 RX ADMIN — DEXMEDETOMIDINE HYDROCHLORIDE 1.2 MCG/KG/HR: 100 INJECTION, SOLUTION, CONCENTRATE INTRAVENOUS at 08:08

## 2019-01-01 RX ADMIN — BUSPIRONE HYDROCHLORIDE 10 MG: 10 TABLET ORAL at 21:27

## 2019-01-01 RX ADMIN — SUCRALFATE 1 G: 1 SUSPENSION ORAL at 20:13

## 2019-01-01 RX ADMIN — SUCRALFATE 0.5 G: 1 SUSPENSION ORAL at 17:10

## 2019-01-01 RX ADMIN — SUCRALFATE 1 G: 1 SUSPENSION ORAL at 14:13

## 2019-01-01 RX ADMIN — DICYCLOMINE HYDROCHLORIDE 10 MG: 10 CAPSULE ORAL at 21:03

## 2019-01-01 RX ADMIN — FUROSEMIDE 40 MG: 40 TABLET ORAL at 08:19

## 2019-01-01 RX ADMIN — CARVEDILOL 6.25 MG: 6.25 TABLET, FILM COATED ORAL at 20:38

## 2019-01-01 RX ADMIN — ALPRAZOLAM 0.25 MG: 0.25 TABLET ORAL at 08:12

## 2019-01-01 RX ADMIN — BUSPIRONE HYDROCHLORIDE 10 MG: 10 TABLET ORAL at 00:43

## 2019-01-01 RX ADMIN — BUDESONIDE 0.5 MG: 0.5 INHALANT RESPIRATORY (INHALATION) at 10:48

## 2019-01-01 RX ADMIN — SODIUM CHLORIDE, PRESERVATIVE FREE 3 ML: 5 INJECTION INTRAVENOUS at 08:52

## 2019-01-01 RX ADMIN — OXYCODONE HYDROCHLORIDE AND ACETAMINOPHEN 1 TABLET: 7.5; 325 TABLET ORAL at 20:17

## 2019-01-01 RX ADMIN — CARVEDILOL 6.25 MG: 3.12 TABLET, FILM COATED ORAL at 20:13

## 2019-01-01 RX ADMIN — SUCRALFATE 0.5 G: 1 SUSPENSION ORAL at 06:18

## 2019-01-01 RX ADMIN — DEXMEDETOMIDINE HYDROCHLORIDE 1.2 MCG/KG/HR: 100 INJECTION, SOLUTION, CONCENTRATE INTRAVENOUS at 00:35

## 2019-01-01 RX ADMIN — PANTOPRAZOLE SODIUM 40 MG: 40 TABLET, DELAYED RELEASE ORAL at 08:19

## 2019-01-01 RX ADMIN — ALPRAZOLAM 0.25 MG: 0.25 TABLET ORAL at 20:38

## 2019-01-01 RX ADMIN — ALPRAZOLAM 0.25 MG: 0.25 TABLET ORAL at 05:13

## 2019-01-01 RX ADMIN — ROFLUMILAST 500 MCG: 500 TABLET ORAL at 08:58

## 2019-01-01 RX ADMIN — ENOXAPARIN SODIUM 40 MG: 40 INJECTION SUBCUTANEOUS at 21:23

## 2019-01-01 RX ADMIN — BUDESONIDE 0.5 MG: 0.5 INHALANT RESPIRATORY (INHALATION) at 21:00

## 2019-01-01 RX ADMIN — PHENYLEPHRINE HYDROCHLORIDE 100 MCG: 10 INJECTION INTRAVENOUS at 16:11

## 2019-01-01 RX ADMIN — MORPHINE SULFATE 4 MG: 2 INJECTION, SOLUTION INTRAMUSCULAR; INTRAVENOUS at 01:41

## 2019-01-01 RX ADMIN — BUDESONIDE 0.5 MG: 0.5 INHALANT RESPIRATORY (INHALATION) at 19:43

## 2019-01-01 RX ADMIN — HYDROCODONE BITARTRATE AND ACETAMINOPHEN 2 TABLET: 7.5; 325 TABLET ORAL at 03:36

## 2019-01-01 RX ADMIN — CARVEDILOL 6.25 MG: 6.25 TABLET, FILM COATED ORAL at 10:02

## 2019-01-01 RX ADMIN — ALPRAZOLAM 0.25 MG: 0.25 TABLET ORAL at 03:45

## 2019-01-01 RX ADMIN — IPRATROPIUM BROMIDE AND ALBUTEROL SULFATE 3 ML: 2.5; .5 SOLUTION RESPIRATORY (INHALATION) at 16:06

## 2019-01-01 RX ADMIN — FENTANYL CITRATE 25 MCG: 50 INJECTION INTRAMUSCULAR; INTRAVENOUS at 23:33

## 2019-01-01 RX ADMIN — IPRATROPIUM BROMIDE AND ALBUTEROL SULFATE 3 ML: 2.5; .5 SOLUTION RESPIRATORY (INHALATION) at 10:44

## 2019-01-01 RX ADMIN — METHYLPREDNISOLONE SODIUM SUCCINATE 40 MG: 125 INJECTION, POWDER, FOR SOLUTION INTRAMUSCULAR; INTRAVENOUS at 18:56

## 2019-01-01 RX ADMIN — POTASSIUM CHLORIDE 40 MEQ: 1.5 POWDER, FOR SOLUTION ORAL at 06:27

## 2019-01-01 RX ADMIN — BUDESONIDE 0.5 MG: 0.5 INHALANT RESPIRATORY (INHALATION) at 07:12

## 2019-01-01 RX ADMIN — METHYLPREDNISOLONE SODIUM SUCCINATE 40 MG: 40 INJECTION, POWDER, FOR SOLUTION INTRAMUSCULAR; INTRAVENOUS at 16:46

## 2019-01-01 RX ADMIN — METHYLPREDNISOLONE SODIUM SUCCINATE 40 MG: 40 INJECTION, POWDER, FOR SOLUTION INTRAMUSCULAR; INTRAVENOUS at 00:31

## 2019-01-01 RX ADMIN — ALBUTEROL SULFATE 6 PUFF: 90 AEROSOL, METERED RESPIRATORY (INHALATION) at 14:49

## 2019-01-01 RX ADMIN — OXYCODONE HYDROCHLORIDE AND ACETAMINOPHEN 1 TABLET: 7.5; 325 TABLET ORAL at 16:09

## 2019-01-01 RX ADMIN — TIOTROPIUM BROMIDE INHALATION SPRAY 2 PUFF: 3.12 SPRAY, METERED RESPIRATORY (INHALATION) at 07:38

## 2019-01-01 RX ADMIN — FUROSEMIDE 40 MG: 40 TABLET ORAL at 14:23

## 2019-01-01 RX ADMIN — SENNOSIDES,DOCUSATE SODIUM 2 TABLET: 50; 8.6 TABLET, FILM COATED ORAL at 20:07

## 2019-01-01 RX ADMIN — CEFEPIME HYDROCHLORIDE 2 G: 2 INJECTION, POWDER, FOR SOLUTION INTRAVENOUS at 05:37

## 2019-01-01 RX ADMIN — CARVEDILOL 6.25 MG: 6.25 TABLET, FILM COATED ORAL at 20:11

## 2019-01-01 RX ADMIN — HYDROCODONE BITARTRATE AND ACETAMINOPHEN 1 TABLET: 7.5; 325 TABLET ORAL at 01:20

## 2019-01-01 RX ADMIN — HYDROCODONE BITARTRATE AND ACETAMINOPHEN 1 TABLET: 7.5; 325 TABLET ORAL at 02:53

## 2019-01-01 RX ADMIN — CHLORHEXIDINE GLUCONATE 15 ML: 1.2 RINSE ORAL at 08:12

## 2019-01-01 RX ADMIN — IPRATROPIUM BROMIDE AND ALBUTEROL SULFATE 3 ML: 2.5; .5 SOLUTION RESPIRATORY (INHALATION) at 11:40

## 2019-01-01 RX ADMIN — PANTOPRAZOLE SODIUM 40 MG: 40 TABLET, DELAYED RELEASE ORAL at 05:31

## 2019-01-01 RX ADMIN — PROPOFOL 15 MCG/KG/MIN: 10 INJECTION, EMULSION INTRAVENOUS at 16:25

## 2019-01-01 RX ADMIN — ALBUTEROL SULFATE 6 PUFF: 90 AEROSOL, METERED RESPIRATORY (INHALATION) at 14:30

## 2019-01-01 RX ADMIN — HYDROCODONE BITARTRATE AND ACETAMINOPHEN 2 TABLET: 5; 325 TABLET ORAL at 04:31

## 2019-01-01 RX ADMIN — IPRATROPIUM BROMIDE AND ALBUTEROL SULFATE 3 ML: 2.5; .5 SOLUTION RESPIRATORY (INHALATION) at 17:09

## 2019-01-01 RX ADMIN — ROFLUMILAST 500 MCG: 500 TABLET ORAL at 08:57

## 2019-01-01 RX ADMIN — LOPERAMIDE HYDROCHLORIDE 2 MG: 2 CAPSULE ORAL at 20:36

## 2019-01-01 RX ADMIN — CARVEDILOL 6.25 MG: 6.25 TABLET, FILM COATED ORAL at 08:50

## 2019-01-01 RX ADMIN — SERTRALINE 50 MG: 50 TABLET, FILM COATED ORAL at 08:17

## 2019-01-01 RX ADMIN — SODIUM CHLORIDE, PRESERVATIVE FREE 3 ML: 5 INJECTION INTRAVENOUS at 20:05

## 2019-01-01 RX ADMIN — PREDNISONE 20 MG: 20 TABLET ORAL at 08:07

## 2019-01-01 RX ADMIN — DICYCLOMINE HYDROCHLORIDE 10 MG: 10 CAPSULE ORAL at 17:26

## 2019-01-01 RX ADMIN — SODIUM CHLORIDE, PRESERVATIVE FREE 3 ML: 5 INJECTION INTRAVENOUS at 21:24

## 2019-01-01 RX ADMIN — HYDROCODONE BITARTRATE AND ACETAMINOPHEN 1 TABLET: 5; 325 TABLET ORAL at 08:17

## 2019-01-01 RX ADMIN — DEXMEDETOMIDINE HYDROCHLORIDE 0.2 MCG/KG/HR: 100 INJECTION, SOLUTION INTRAVENOUS at 06:08

## 2019-01-01 RX ADMIN — ALBUTEROL SULFATE 6 PUFF: 90 AEROSOL, METERED RESPIRATORY (INHALATION) at 07:01

## 2019-01-01 RX ADMIN — PREDNISONE 20 MG: 20 TABLET ORAL at 08:01

## 2019-01-01 RX ADMIN — FUROSEMIDE 40 MG: 40 TABLET ORAL at 08:17

## 2019-01-01 RX ADMIN — DICYCLOMINE HYDROCHLORIDE 10 MG: 10 CAPSULE ORAL at 20:40

## 2019-01-01 RX ADMIN — SODIUM CHLORIDE, PRESERVATIVE FREE 3 ML: 5 INJECTION INTRAVENOUS at 08:18

## 2019-01-01 RX ADMIN — DICYCLOMINE HYDROCHLORIDE 10 MG: 10 CAPSULE ORAL at 21:25

## 2019-01-01 RX ADMIN — ALPRAZOLAM 0.5 MG: 0.5 TABLET ORAL at 20:11

## 2019-01-01 RX ADMIN — SODIUM CHLORIDE, PRESERVATIVE FREE 3 ML: 5 INJECTION INTRAVENOUS at 21:12

## 2019-01-01 RX ADMIN — METHYLPREDNISOLONE SODIUM SUCCINATE 40 MG: 40 INJECTION, POWDER, FOR SOLUTION INTRAMUSCULAR; INTRAVENOUS at 08:34

## 2019-01-01 RX ADMIN — CARVEDILOL 6.25 MG: 3.12 TABLET, FILM COATED ORAL at 08:22

## 2019-01-01 RX ADMIN — POTASSIUM CHLORIDE 10 MEQ: 7.46 INJECTION, SOLUTION INTRAVENOUS at 03:26

## 2019-01-01 RX ADMIN — MORPHINE SULFATE 4 MG: 2 INJECTION, SOLUTION INTRAMUSCULAR; INTRAVENOUS at 04:57

## 2019-01-01 RX ADMIN — SUCRALFATE 0.5 G: 1 SUSPENSION ORAL at 06:07

## 2019-01-01 RX ADMIN — ALPRAZOLAM 0.25 MG: 0.5 TABLET ORAL at 00:47

## 2019-01-01 RX ADMIN — MORPHINE SULFATE 2 MG: 2 INJECTION, SOLUTION INTRAMUSCULAR; INTRAVENOUS at 12:24

## 2019-01-01 RX ADMIN — FAMOTIDINE 20 MG: 10 INJECTION INTRAVENOUS at 21:53

## 2019-01-01 RX ADMIN — ALBUTEROL SULFATE: 90 AEROSOL, METERED RESPIRATORY (INHALATION) at 16:00

## 2019-01-01 RX ADMIN — PROPOFOL 50 MG: 10 INJECTION, EMULSION INTRAVENOUS at 17:37

## 2019-01-01 RX ADMIN — BUSPIRONE HYDROCHLORIDE 10 MG: 10 TABLET ORAL at 21:28

## 2019-01-01 RX ADMIN — GUAIFENESIN 1200 MG: 600 TABLET, EXTENDED RELEASE ORAL at 08:20

## 2019-01-01 RX ADMIN — INSULIN LISPRO 2 UNITS: 100 INJECTION, SOLUTION INTRAVENOUS; SUBCUTANEOUS at 12:52

## 2019-01-01 RX ADMIN — METHYLPREDNISOLONE SODIUM SUCCINATE 80 MG: 125 INJECTION, POWDER, FOR SOLUTION INTRAMUSCULAR; INTRAVENOUS at 09:50

## 2019-01-01 RX ADMIN — SODIUM CHLORIDE, POTASSIUM CHLORIDE, SODIUM LACTATE AND CALCIUM CHLORIDE 9 ML/HR: 600; 310; 30; 20 INJECTION, SOLUTION INTRAVENOUS at 21:54

## 2019-01-01 RX ADMIN — SODIUM CHLORIDE, POTASSIUM CHLORIDE, SODIUM LACTATE AND CALCIUM CHLORIDE 100 ML/HR: 600; 310; 30; 20 INJECTION, SOLUTION INTRAVENOUS at 20:53

## 2019-01-01 RX ADMIN — SODIUM CHLORIDE 0.5 MCG/KG/HR: 9 INJECTION, SOLUTION INTRAVENOUS at 06:18

## 2019-01-01 RX ADMIN — ALPRAZOLAM 0.25 MG: 0.25 TABLET ORAL at 06:07

## 2019-01-01 RX ADMIN — BUDESONIDE 0.5 MG: 0.5 INHALANT RESPIRATORY (INHALATION) at 07:37

## 2019-01-01 RX ADMIN — AZITHROMYCIN MONOHYDRATE 500 MG: 500 INJECTION, POWDER, LYOPHILIZED, FOR SOLUTION INTRAVENOUS at 09:00

## 2019-01-01 RX ADMIN — IPRATROPIUM BROMIDE AND ALBUTEROL SULFATE 3 ML: 2.5; .5 SOLUTION RESPIRATORY (INHALATION) at 19:43

## 2019-01-01 RX ADMIN — HYDROCODONE BITARTRATE AND ACETAMINOPHEN 2 TABLET: 7.5; 325 TABLET ORAL at 02:54

## 2019-01-01 RX ADMIN — ROCURONIUM BROMIDE 30 MG: 10 INJECTION INTRAVENOUS at 16:05

## 2019-01-01 RX ADMIN — HYDROCODONE BITARTRATE AND ACETAMINOPHEN 1 TABLET: 7.5; 325 TABLET ORAL at 21:24

## 2019-01-01 RX ADMIN — SUCRALFATE 0.5 G: 1 SUSPENSION ORAL at 11:32

## 2019-01-01 RX ADMIN — BUDESONIDE 0.5 MG: 0.5 INHALANT RESPIRATORY (INHALATION) at 07:38

## 2019-01-01 RX ADMIN — PREDNISONE 20 MG: 20 TABLET ORAL at 14:14

## 2019-01-01 RX ADMIN — SODIUM CHLORIDE, PRESERVATIVE FREE 3 ML: 5 INJECTION INTRAVENOUS at 09:47

## 2019-01-01 RX ADMIN — BUSPIRONE HYDROCHLORIDE 10 MG: 10 TABLET ORAL at 08:35

## 2019-01-01 RX ADMIN — ALBUTEROL SULFATE 6 PUFF: 90 AEROSOL, METERED RESPIRATORY (INHALATION) at 16:04

## 2019-01-01 RX ADMIN — ASPIRIN 81 MG: 81 TABLET, DELAYED RELEASE ORAL at 07:38

## 2019-01-01 RX ADMIN — CEPHALEXIN 500 MG: 500 CAPSULE ORAL at 20:38

## 2019-01-01 RX ADMIN — BUDESONIDE 0.5 MG: 0.5 INHALANT RESPIRATORY (INHALATION) at 11:17

## 2019-01-01 RX ADMIN — LOSARTAN POTASSIUM 25 MG: 25 TABLET, FILM COATED ORAL at 08:20

## 2019-01-01 RX ADMIN — GUAIFENESIN 1200 MG: 600 TABLET, EXTENDED RELEASE ORAL at 08:43

## 2019-01-01 RX ADMIN — DILTIAZEM HYDROCHLORIDE 10 MG: 5 INJECTION INTRAVENOUS at 11:17

## 2019-01-01 RX ADMIN — PREDNISONE 10 MG: 10 TABLET ORAL at 08:36

## 2019-01-01 RX ADMIN — SUCRALFATE 1 G: 1 SUSPENSION ORAL at 01:43

## 2019-01-01 RX ADMIN — ALPRAZOLAM 0.25 MG: 0.25 TABLET ORAL at 20:36

## 2019-01-01 RX ADMIN — DICYCLOMINE HYDROCHLORIDE 10 MG: 10 CAPSULE ORAL at 16:29

## 2019-01-01 RX ADMIN — GLYCOPYRROLATE 0.4 MG: 0.2 INJECTION INTRAMUSCULAR; INTRAVENOUS at 01:40

## 2019-01-01 RX ADMIN — ROFLUMILAST 500 MCG: 500 TABLET ORAL at 08:01

## 2019-01-01 RX ADMIN — CARVEDILOL 6.25 MG: 6.25 TABLET, FILM COATED ORAL at 08:43

## 2019-01-01 RX ADMIN — SUCRALFATE 1 G: 1 SUSPENSION ORAL at 08:27

## 2019-01-01 RX ADMIN — ALPRAZOLAM 0.5 MG: 0.5 TABLET ORAL at 16:15

## 2019-01-01 RX ADMIN — BUSPIRONE HYDROCHLORIDE 10 MG: 10 TABLET ORAL at 08:19

## 2019-01-01 RX ADMIN — CARVEDILOL 6.25 MG: 6.25 TABLET, FILM COATED ORAL at 09:34

## 2019-01-01 RX ADMIN — CEFEPIME HYDROCHLORIDE 2 G: 2 INJECTION, POWDER, FOR SOLUTION INTRAVENOUS at 04:46

## 2019-01-01 RX ADMIN — HYDROCODONE BITARTRATE AND ACETAMINOPHEN 2 TABLET: 7.5; 325 TABLET ORAL at 13:38

## 2019-01-01 RX ADMIN — IPRATROPIUM BROMIDE AND ALBUTEROL SULFATE 3 ML: 2.5; .5 SOLUTION RESPIRATORY (INHALATION) at 19:46

## 2019-01-01 RX ADMIN — ATORVASTATIN CALCIUM 10 MG: 10 TABLET, FILM COATED ORAL at 20:13

## 2019-01-01 RX ADMIN — SUCRALFATE 1 G: 1 SUSPENSION ORAL at 19:08

## 2019-01-01 RX ADMIN — SENNOSIDES,DOCUSATE SODIUM 2 TABLET: 50; 8.6 TABLET, FILM COATED ORAL at 21:23

## 2019-01-01 RX ADMIN — CARVEDILOL 6.25 MG: 6.25 TABLET, FILM COATED ORAL at 20:19

## 2019-01-01 RX ADMIN — CHLORHEXIDINE GLUCONATE 15 ML: 1.2 RINSE ORAL at 08:51

## 2019-01-01 RX ADMIN — SUCRALFATE 1 G: 1 SUSPENSION ORAL at 18:37

## 2019-01-01 RX ADMIN — SUCRALFATE 0.5 G: 1 SUSPENSION ORAL at 21:21

## 2019-01-01 RX ADMIN — HYDROCODONE BITARTRATE AND ACETAMINOPHEN 1 TABLET: 7.5; 325 TABLET ORAL at 14:21

## 2019-01-01 RX ADMIN — LOSARTAN POTASSIUM 50 MG: 50 TABLET, FILM COATED ORAL at 08:26

## 2019-01-01 RX ADMIN — ALPRAZOLAM 0.25 MG: 0.25 TABLET ORAL at 15:29

## 2019-01-01 RX ADMIN — ATORVASTATIN CALCIUM 10 MG: 10 TABLET, FILM COATED ORAL at 21:24

## 2019-01-01 RX ADMIN — IPRATROPIUM BROMIDE AND ALBUTEROL SULFATE 3 ML: 2.5; .5 SOLUTION RESPIRATORY (INHALATION) at 16:01

## 2019-01-01 RX ADMIN — ALPRAZOLAM 0.5 MG: 0.5 TABLET ORAL at 17:24

## 2019-01-01 RX ADMIN — TRANEXAMIC ACID 1000 MG: 100 INJECTION, SOLUTION INTRAVENOUS at 16:26

## 2019-01-01 RX ADMIN — HYDROCODONE BITARTRATE AND ACETAMINOPHEN 2 TABLET: 7.5; 325 TABLET ORAL at 21:04

## 2019-01-01 RX ADMIN — HYDROCODONE BITARTRATE AND ACETAMINOPHEN 2 TABLET: 7.5; 325 TABLET ORAL at 13:49

## 2019-01-01 RX ADMIN — CARVEDILOL 6.25 MG: 6.25 TABLET, FILM COATED ORAL at 20:46

## 2019-01-01 RX ADMIN — CARVEDILOL 6.25 MG: 6.25 TABLET, FILM COATED ORAL at 21:03

## 2019-01-01 RX ADMIN — POTASSIUM CHLORIDE 20 MEQ: 750 CAPSULE, EXTENDED RELEASE ORAL at 08:43

## 2019-01-01 RX ADMIN — GUAIFENESIN 1200 MG: 600 TABLET, EXTENDED RELEASE ORAL at 21:27

## 2019-01-01 RX ADMIN — ONDANSETRON HYDROCHLORIDE 4 MG: 2 SOLUTION INTRAMUSCULAR; INTRAVENOUS at 20:53

## 2019-01-01 RX ADMIN — SUCRALFATE 0.5 G: 1 SUSPENSION ORAL at 20:39

## 2019-01-01 RX ADMIN — ARFORMOTEROL TARTRATE 15 MCG: 15 SOLUTION RESPIRATORY (INHALATION) at 08:25

## 2019-01-01 RX ADMIN — POTASSIUM CHLORIDE 10 MEQ: 7.46 INJECTION, SOLUTION INTRAVENOUS at 00:13

## 2019-01-01 RX ADMIN — BUSPIRONE HYDROCHLORIDE 10 MG: 10 TABLET ORAL at 08:01

## 2019-01-01 RX ADMIN — ALBUTEROL SULFATE 6 PUFF: 90 AEROSOL, METERED RESPIRATORY (INHALATION) at 19:26

## 2019-01-01 RX ADMIN — MORPHINE SULFATE 4 MG: 2 INJECTION, SOLUTION INTRAMUSCULAR; INTRAVENOUS at 21:01

## 2019-01-01 RX ADMIN — IPRATROPIUM BROMIDE AND ALBUTEROL SULFATE 3 ML: 2.5; .5 SOLUTION RESPIRATORY (INHALATION) at 15:13

## 2019-01-01 RX ADMIN — CARVEDILOL 6.25 MG: 6.25 TABLET, FILM COATED ORAL at 21:23

## 2019-01-01 RX ADMIN — HYDROCODONE BITARTRATE AND ACETAMINOPHEN 1 TABLET: 5; 325 TABLET ORAL at 13:32

## 2019-01-01 RX ADMIN — FERROUS SULFATE TAB 325 MG (65 MG ELEMENTAL FE) 325 MG: 325 (65 FE) TAB at 08:27

## 2019-01-01 RX ADMIN — ALPRAZOLAM 0.25 MG: 0.25 TABLET ORAL at 21:30

## 2019-01-01 RX ADMIN — CARVEDILOL 6.25 MG: 6.25 TABLET, FILM COATED ORAL at 08:59

## 2019-01-01 RX ADMIN — ATORVASTATIN CALCIUM 10 MG: 10 TABLET, FILM COATED ORAL at 20:15

## 2019-01-01 RX ADMIN — LOSARTAN POTASSIUM 25 MG: 25 TABLET, FILM COATED ORAL at 08:03

## 2019-01-01 RX ADMIN — LANSOPRAZOLE 30 MG: KIT at 08:00

## 2019-01-01 RX ADMIN — PREDNISONE 10 MG: 10 TABLET ORAL at 08:43

## 2019-01-01 RX ADMIN — IPRATROPIUM BROMIDE AND ALBUTEROL SULFATE 3 ML: 2.5; .5 SOLUTION RESPIRATORY (INHALATION) at 15:04

## 2019-01-01 RX ADMIN — POTASSIUM CHLORIDE 10 MEQ: 7.46 INJECTION, SOLUTION INTRAVENOUS at 04:39

## 2019-01-01 RX ADMIN — FERROUS SULFATE TAB 325 MG (65 MG ELEMENTAL FE) 325 MG: 325 (65 FE) TAB at 14:15

## 2019-01-01 RX ADMIN — FENTANYL CITRATE 25 MCG: 50 INJECTION INTRAMUSCULAR; INTRAVENOUS at 04:45

## 2019-01-01 RX ADMIN — SUCRALFATE 1 G: 1 SUSPENSION ORAL at 08:18

## 2019-01-01 RX ADMIN — BUSPIRONE HYDROCHLORIDE 10 MG: 10 TABLET ORAL at 20:38

## 2019-01-01 RX ADMIN — SUCRALFATE 0.5 G: 1 SUSPENSION ORAL at 05:31

## 2019-01-01 RX ADMIN — PROPOFOL 25 MCG/KG/MIN: 10 INJECTION, EMULSION INTRAVENOUS at 17:11

## 2019-01-01 RX ADMIN — SUCRALFATE 0.5 G: 1 SUSPENSION ORAL at 11:20

## 2019-01-01 RX ADMIN — CEPHALEXIN 500 MG: 500 CAPSULE ORAL at 20:36

## 2019-01-01 RX ADMIN — GUAIFENESIN 1200 MG: 600 TABLET, EXTENDED RELEASE ORAL at 08:18

## 2019-01-01 RX ADMIN — FERROUS SULFATE TAB 325 MG (65 MG ELEMENTAL FE) 325 MG: 325 (65 FE) TAB at 08:59

## 2019-01-01 RX ADMIN — DEXMEDETOMIDINE HYDROCHLORIDE 1.2 MCG/KG/HR: 100 INJECTION, SOLUTION, CONCENTRATE INTRAVENOUS at 13:24

## 2019-01-01 RX ADMIN — SUCRALFATE 0.5 G: 1 SUSPENSION ORAL at 10:36

## 2019-01-01 RX ADMIN — MORPHINE SULFATE 2 MG: 2 INJECTION, SOLUTION INTRAMUSCULAR; INTRAVENOUS at 13:59

## 2019-01-01 RX ADMIN — HYDROMORPHONE HYDROCHLORIDE 0.25 MG: 1 INJECTION, SOLUTION INTRAMUSCULAR; INTRAVENOUS; SUBCUTANEOUS at 04:30

## 2019-01-01 RX ADMIN — SUCRALFATE 1 G: 1 SUSPENSION ORAL at 12:15

## 2019-01-01 RX ADMIN — DICYCLOMINE HYDROCHLORIDE 10 MG: 10 CAPSULE ORAL at 20:46

## 2019-01-01 RX ADMIN — FERROUS SULFATE TAB 325 MG (65 MG ELEMENTAL FE) 325 MG: 325 (65 FE) TAB at 08:19

## 2019-01-01 RX ADMIN — SUCRALFATE 0.5 G: 1 SUSPENSION ORAL at 11:44

## 2019-01-01 RX ADMIN — PROPOFOL 35 MCG/KG/MIN: 10 INJECTION, EMULSION INTRAVENOUS at 21:16

## 2019-01-01 RX ADMIN — PANTOPRAZOLE SODIUM 40 MG: 40 TABLET, DELAYED RELEASE ORAL at 08:16

## 2019-01-01 RX ADMIN — HYDROCODONE BITARTRATE AND ACETAMINOPHEN 1 TABLET: 7.5; 325 TABLET ORAL at 14:12

## 2019-01-01 RX ADMIN — PANTOPRAZOLE SODIUM 40 MG: 40 TABLET, DELAYED RELEASE ORAL at 06:21

## 2019-01-01 RX ADMIN — ENOXAPARIN SODIUM 50 MG: 60 INJECTION SUBCUTANEOUS at 08:17

## 2019-01-01 RX ADMIN — LOPERAMIDE HYDROCHLORIDE 2 MG: 2 CAPSULE ORAL at 19:31

## 2019-01-01 RX ADMIN — SERTRALINE 50 MG: 50 TABLET, FILM COATED ORAL at 08:22

## 2019-01-01 RX ADMIN — SUCRALFATE 0.5 G: 1 SUSPENSION ORAL at 12:17

## 2019-01-01 RX ADMIN — SUCRALFATE 0.5 G: 1 SUSPENSION ORAL at 21:27

## 2019-01-01 RX ADMIN — HYDROCODONE BITARTRATE AND ACETAMINOPHEN 1 TABLET: 5; 325 TABLET ORAL at 20:08

## 2019-01-01 RX ADMIN — ONDANSETRON 4 MG: 2 INJECTION INTRAMUSCULAR; INTRAVENOUS at 12:23

## 2019-01-01 RX ADMIN — ALPRAZOLAM 0.25 MG: 0.25 TABLET ORAL at 20:37

## 2019-01-01 RX ADMIN — ALPRAZOLAM 0.25 MG: 0.25 TABLET ORAL at 08:23

## 2019-01-01 RX ADMIN — LANSOPRAZOLE 30 MG: KIT at 14:46

## 2019-01-01 RX ADMIN — FUROSEMIDE 40 MG: 40 TABLET ORAL at 08:08

## 2019-01-01 RX ADMIN — SUCRALFATE 0.5 G: 1 SUSPENSION ORAL at 06:21

## 2019-01-01 RX ADMIN — FAMOTIDINE 20 MG: 10 INJECTION, SOLUTION INTRAVENOUS at 21:53

## 2019-01-01 RX ADMIN — SUCRALFATE 0.5 G: 1 SUSPENSION ORAL at 21:03

## 2019-01-01 RX ADMIN — HYDROCODONE BITARTRATE AND ACETAMINOPHEN 1 TABLET: 7.5; 325 TABLET ORAL at 14:03

## 2019-01-01 RX ADMIN — POTASSIUM CHLORIDE 20 MEQ: 750 CAPSULE, EXTENDED RELEASE ORAL at 08:56

## 2019-01-01 RX ADMIN — FENTANYL CITRATE 25 MCG: 50 INJECTION INTRAMUSCULAR; INTRAVENOUS at 02:11

## 2019-01-01 RX ADMIN — BUSPIRONE HYDROCHLORIDE 10 MG: 10 TABLET ORAL at 21:24

## 2019-01-01 RX ADMIN — ASPIRIN 81 MG: 81 TABLET, DELAYED RELEASE ORAL at 08:09

## 2019-01-01 RX ADMIN — BUSPIRONE HYDROCHLORIDE 10 MG: 10 TABLET ORAL at 09:34

## 2019-01-01 RX ADMIN — OXYCODONE HYDROCHLORIDE AND ACETAMINOPHEN 1 TABLET: 7.5; 325 TABLET ORAL at 09:59

## 2019-01-01 RX ADMIN — GUAIFENESIN 1200 MG: 600 TABLET, EXTENDED RELEASE ORAL at 08:19

## 2019-01-01 RX ADMIN — PROPOFOL 30 MCG/KG/MIN: 10 INJECTION, EMULSION INTRAVENOUS at 04:12

## 2019-01-01 RX ADMIN — PROPOFOL 10 MCG/KG/MIN: 10 INJECTION, EMULSION INTRAVENOUS at 17:48

## 2019-01-01 RX ADMIN — GLYCOPYRROLATE 0.4 MG: 0.2 INJECTION INTRAMUSCULAR; INTRAVENOUS at 16:47

## 2019-01-01 RX ADMIN — HYDROCODONE BITARTRATE AND ACETAMINOPHEN 2 TABLET: 7.5; 325 TABLET ORAL at 02:29

## 2019-01-01 RX ADMIN — HYDROCODONE BITARTRATE AND ACETAMINOPHEN 1 TABLET: 5; 325 TABLET ORAL at 20:37

## 2019-01-01 RX ADMIN — HYDROCODONE BITARTRATE AND ACETAMINOPHEN 1 TABLET: 5; 325 TABLET ORAL at 14:12

## 2019-01-01 RX ADMIN — ONDANSETRON 4 MG: 2 INJECTION INTRAMUSCULAR; INTRAVENOUS at 21:38

## 2019-01-01 RX ADMIN — ALBUTEROL SULFATE 6 PUFF: 90 AEROSOL, METERED RESPIRATORY (INHALATION) at 04:39

## 2019-01-01 RX ADMIN — LIDOCAINE HYDROCHLORIDE 40 MG: 20 INJECTION, SOLUTION INFILTRATION; PERINEURAL at 08:13

## 2019-01-01 RX ADMIN — SODIUM CHLORIDE, PRESERVATIVE FREE 3 ML: 5 INJECTION INTRAVENOUS at 20:12

## 2019-01-01 RX ADMIN — SODIUM CHLORIDE, PRESERVATIVE FREE 3 ML: 5 INJECTION INTRAVENOUS at 21:35

## 2019-01-01 RX ADMIN — DEXMEDETOMIDINE HYDROCHLORIDE 0.6 MCG/KG/HR: 100 INJECTION, SOLUTION INTRAVENOUS at 23:03

## 2019-01-01 RX ADMIN — OXYCODONE HYDROCHLORIDE AND ACETAMINOPHEN 1 TABLET: 7.5; 325 TABLET ORAL at 21:09

## 2019-01-01 RX ADMIN — HYDROCODONE BITARTRATE AND ACETAMINOPHEN 1 TABLET: 7.5; 325 TABLET ORAL at 10:09

## 2019-01-01 RX ADMIN — ASPIRIN 81 MG: 81 TABLET, COATED ORAL at 08:20

## 2019-01-01 RX ADMIN — METRONIDAZOLE 500 MG: 500 TABLET, FILM COATED ORAL at 08:59

## 2019-01-01 RX ADMIN — LANSOPRAZOLE 30 MG: KIT at 07:06

## 2019-01-01 RX ADMIN — HYDROCODONE BITARTRATE AND ACETAMINOPHEN 2 TABLET: 7.5; 325 TABLET ORAL at 08:19

## 2019-01-01 RX ADMIN — MAGNESIUM SULFATE HEPTAHYDRATE 1 G: 1 INJECTION, SOLUTION INTRAVENOUS at 16:15

## 2019-01-01 RX ADMIN — IPRATROPIUM BROMIDE AND ALBUTEROL SULFATE 3 ML: 2.5; .5 SOLUTION RESPIRATORY (INHALATION) at 07:37

## 2019-01-01 RX ADMIN — METRONIDAZOLE 500 MG: 500 TABLET, FILM COATED ORAL at 08:20

## 2019-01-01 RX ADMIN — PROPOFOL 45 MCG/KG/MIN: 10 INJECTION, EMULSION INTRAVENOUS at 08:39

## 2019-01-01 RX ADMIN — INSULIN LISPRO 4 UNITS: 100 INJECTION, SOLUTION INTRAVENOUS; SUBCUTANEOUS at 06:26

## 2019-01-01 RX ADMIN — OXYCODONE HYDROCHLORIDE AND ACETAMINOPHEN 1 TABLET: 7.5; 325 TABLET ORAL at 10:36

## 2019-01-01 RX ADMIN — PREDNISONE 20 MG: 20 TABLET ORAL at 08:27

## 2019-01-01 RX ADMIN — CARVEDILOL 6.25 MG: 6.25 TABLET, FILM COATED ORAL at 20:08

## 2019-01-01 RX ADMIN — CHLORHEXIDINE GLUCONATE 15 ML: 1.2 RINSE ORAL at 21:04

## 2019-01-01 RX ADMIN — POTASSIUM CHLORIDE 20 MEQ: 750 CAPSULE, EXTENDED RELEASE ORAL at 08:36

## 2019-01-01 RX ADMIN — BUDESONIDE 0.5 MG: 0.5 INHALANT RESPIRATORY (INHALATION) at 21:23

## 2019-01-01 RX ADMIN — PANTOPRAZOLE SODIUM 40 MG: 40 TABLET, DELAYED RELEASE ORAL at 05:04

## 2019-01-01 RX ADMIN — CEFEPIME HYDROCHLORIDE 2 G: 2 INJECTION, POWDER, FOR SOLUTION INTRAVENOUS at 17:03

## 2019-01-01 RX ADMIN — HYDROCODONE BITARTRATE AND ACETAMINOPHEN 2 TABLET: 7.5; 325 TABLET ORAL at 04:13

## 2019-01-01 RX ADMIN — HYDROCODONE BITARTRATE AND ACETAMINOPHEN 1 TABLET: 5; 325 TABLET ORAL at 15:42

## 2019-01-01 RX ADMIN — GUAIFENESIN 1200 MG: 600 TABLET, EXTENDED RELEASE ORAL at 20:36

## 2019-01-01 RX ADMIN — FENTANYL CITRATE 50 MCG: 50 INJECTION INTRAMUSCULAR; INTRAVENOUS at 16:03

## 2019-01-01 RX ADMIN — HYDROCODONE BITARTRATE AND ACETAMINOPHEN 1 TABLET: 5; 325 TABLET ORAL at 09:01

## 2019-01-01 RX ADMIN — ALBUTEROL SULFATE 2.5 MG: 2.5 SOLUTION RESPIRATORY (INHALATION) at 08:21

## 2019-01-01 RX ADMIN — METHYLPREDNISOLONE SODIUM SUCCINATE 40 MG: 40 INJECTION, POWDER, FOR SOLUTION INTRAMUSCULAR; INTRAVENOUS at 08:05

## 2019-01-01 RX ADMIN — HYDROCODONE BITARTRATE AND ACETAMINOPHEN 2 TABLET: 7.5; 325 TABLET ORAL at 08:26

## 2019-01-01 RX ADMIN — METHYLPREDNISOLONE SODIUM SUCCINATE 60 MG: 125 INJECTION, POWDER, FOR SOLUTION INTRAMUSCULAR; INTRAVENOUS at 01:44

## 2019-01-01 RX ADMIN — PREDNISONE 10 MG: 10 TABLET ORAL at 08:07

## 2019-01-01 RX ADMIN — ASPIRIN 81 MG: 81 TABLET, DELAYED RELEASE ORAL at 12:05

## 2019-01-01 RX ADMIN — LOSARTAN POTASSIUM 25 MG: 25 TABLET, FILM COATED ORAL at 08:06

## 2019-01-01 RX ADMIN — POTASSIUM CHLORIDE 40 MEQ: 750 CAPSULE, EXTENDED RELEASE ORAL at 10:19

## 2019-01-01 RX ADMIN — METHYLPREDNISOLONE SODIUM SUCCINATE 40 MG: 125 INJECTION, POWDER, FOR SOLUTION INTRAMUSCULAR; INTRAVENOUS at 10:36

## 2019-01-01 RX ADMIN — TIOTROPIUM BROMIDE INHALATION SPRAY 2 PUFF: 3.12 SPRAY, METERED RESPIRATORY (INHALATION) at 07:16

## 2019-01-01 RX ADMIN — IPRATROPIUM BROMIDE AND ALBUTEROL SULFATE 3 ML: 2.5; .5 SOLUTION RESPIRATORY (INHALATION) at 03:30

## 2019-01-01 RX ADMIN — DEXMEDETOMIDINE HYDROCHLORIDE 1 MCG/KG/HR: 100 INJECTION, SOLUTION, CONCENTRATE INTRAVENOUS at 18:22

## 2019-01-01 RX ADMIN — PROPOFOL 80 MG: 10 INJECTION, EMULSION INTRAVENOUS at 16:04

## 2019-01-01 RX ADMIN — DEXMEDETOMIDINE HYDROCHLORIDE 1.2 MCG/KG/HR: 100 INJECTION, SOLUTION, CONCENTRATE INTRAVENOUS at 23:32

## 2019-01-01 RX ADMIN — ASPIRIN 81 MG: 81 TABLET, COATED ORAL at 08:01

## 2019-01-01 RX ADMIN — CEFTRIAXONE SODIUM 1 G: 1 INJECTION, SOLUTION INTRAVENOUS at 12:15

## 2019-01-01 RX ADMIN — ROFLUMILAST 500 MCG: 500 TABLET ORAL at 08:20

## 2019-01-01 RX ADMIN — MORPHINE SULFATE 4 MG: 2 INJECTION, SOLUTION INTRAMUSCULAR; INTRAVENOUS at 21:22

## 2019-01-01 RX ADMIN — BUDESONIDE 0.5 MG: 0.5 INHALANT RESPIRATORY (INHALATION) at 08:53

## 2019-01-01 RX ADMIN — CARVEDILOL 6.25 MG: 6.25 TABLET, FILM COATED ORAL at 20:58

## 2019-01-01 RX ADMIN — SERTRALINE 50 MG: 50 TABLET, FILM COATED ORAL at 08:03

## 2019-01-01 RX ADMIN — HYDROCODONE BITARTRATE AND ACETAMINOPHEN 2 TABLET: 7.5; 325 TABLET ORAL at 20:29

## 2019-01-01 RX ADMIN — CARVEDILOL 6.25 MG: 6.25 TABLET, FILM COATED ORAL at 08:34

## 2019-01-01 RX ADMIN — ALBUTEROL SULFATE 6 PUFF: 90 AEROSOL, METERED RESPIRATORY (INHALATION) at 15:25

## 2019-01-01 RX ADMIN — EPHEDRINE SULFATE 5 MG: 50 INJECTION INTRAMUSCULAR; INTRAVENOUS; SUBCUTANEOUS at 08:26

## 2019-01-01 RX ADMIN — LOPERAMIDE HYDROCHLORIDE 2 MG: 2 CAPSULE ORAL at 06:52

## 2019-01-01 RX ADMIN — SUCRALFATE 0.5 G: 1 SUSPENSION ORAL at 06:52

## 2019-01-01 RX ADMIN — HYDROCODONE BITARTRATE AND ACETAMINOPHEN 2 TABLET: 7.5; 325 TABLET ORAL at 01:06

## 2019-01-01 RX ADMIN — CEFTRIAXONE SODIUM 1 G: 1 INJECTION, SOLUTION INTRAVENOUS at 12:52

## 2019-01-01 RX ADMIN — CEFEPIME HYDROCHLORIDE 2 G: 2 INJECTION, POWDER, FOR SOLUTION INTRAVENOUS at 19:13

## 2019-01-01 RX ADMIN — IPRATROPIUM BROMIDE AND ALBUTEROL SULFATE 3 ML: 2.5; .5 SOLUTION RESPIRATORY (INHALATION) at 16:33

## 2019-01-01 RX ADMIN — SODIUM CHLORIDE 125 ML/HR: 9 INJECTION, SOLUTION INTRAVENOUS at 21:16

## 2019-01-01 RX ADMIN — FERROUS SULFATE TAB 325 MG (65 MG ELEMENTAL FE) 325 MG: 325 (65 FE) TAB at 07:39

## 2019-01-01 RX ADMIN — OXYCODONE HYDROCHLORIDE AND ACETAMINOPHEN 1 TABLET: 7.5; 325 TABLET ORAL at 02:59

## 2019-01-01 RX ADMIN — FENTANYL CITRATE 25 MCG: 50 INJECTION INTRAMUSCULAR; INTRAVENOUS at 14:44

## 2019-01-01 RX ADMIN — SUCRALFATE 1 G: 1 SUSPENSION ORAL at 11:22

## 2019-01-01 RX ADMIN — POTASSIUM CHLORIDE 40 MEQ: 750 CAPSULE, EXTENDED RELEASE ORAL at 12:11

## 2019-01-01 RX ADMIN — PROPOFOL 100 MG: 10 INJECTION, EMULSION INTRAVENOUS at 22:06

## 2019-01-01 RX ADMIN — DICYCLOMINE HYDROCHLORIDE 10 MG: 10 CAPSULE ORAL at 20:52

## 2019-01-01 RX ADMIN — MIDAZOLAM 1 MG: 1 INJECTION INTRAMUSCULAR; INTRAVENOUS at 15:35

## 2019-01-01 RX ADMIN — LANSOPRAZOLE 30 MG: KIT at 06:27

## 2019-01-01 RX ADMIN — CARVEDILOL 6.25 MG: 6.25 TABLET, FILM COATED ORAL at 08:00

## 2019-01-01 RX ADMIN — TIOTROPIUM BROMIDE INHALATION SPRAY 2 PUFF: 3.12 SPRAY, METERED RESPIRATORY (INHALATION) at 11:18

## 2019-01-01 RX ADMIN — PREDNISONE 20 MG: 20 TABLET ORAL at 08:56

## 2019-01-01 RX ADMIN — ALPRAZOLAM 0.25 MG: 0.25 TABLET ORAL at 12:57

## 2019-01-01 RX ADMIN — FENTANYL CITRATE 25 MCG: 50 INJECTION INTRAMUSCULAR; INTRAVENOUS at 02:36

## 2019-01-01 RX ADMIN — HYDROCODONE BITARTRATE AND ACETAMINOPHEN 1 TABLET: 7.5; 325 TABLET ORAL at 18:13

## 2019-01-01 RX ADMIN — METHYLPREDNISOLONE SODIUM SUCCINATE 40 MG: 40 INJECTION, POWDER, FOR SOLUTION INTRAMUSCULAR; INTRAVENOUS at 08:39

## 2019-01-01 RX ADMIN — BUDESONIDE 0.5 MG: 0.5 INHALANT RESPIRATORY (INHALATION) at 22:33

## 2019-01-01 RX ADMIN — FUROSEMIDE 40 MG: 40 TABLET ORAL at 14:14

## 2019-01-01 RX ADMIN — ALBUTEROL SULFATE 2.5 MG: 2.5 SOLUTION RESPIRATORY (INHALATION) at 17:12

## 2019-01-01 RX ADMIN — LANSOPRAZOLE 30 MG: KIT at 06:26

## 2019-01-01 RX ADMIN — SODIUM CHLORIDE 125 ML/HR: 9 INJECTION, SOLUTION INTRAVENOUS at 13:32

## 2019-01-01 RX ADMIN — ALPRAZOLAM 0.5 MG: 0.5 TABLET ORAL at 08:51

## 2019-01-01 RX ADMIN — ALBUTEROL SULFATE 6 PUFF: 90 AEROSOL, METERED RESPIRATORY (INHALATION) at 10:40

## 2019-01-01 RX ADMIN — NEOSTIGMINE METHYLSULFATE 2 MG: 1 INJECTION INTRAMUSCULAR; INTRAVENOUS; SUBCUTANEOUS at 17:45

## 2019-01-01 RX ADMIN — SODIUM CHLORIDE, PRESERVATIVE FREE 3 ML: 5 INJECTION INTRAVENOUS at 20:06

## 2019-01-01 RX ADMIN — DICYCLOMINE HYDROCHLORIDE 10 MG: 10 CAPSULE ORAL at 08:04

## 2019-01-01 RX ADMIN — ENOXAPARIN SODIUM 40 MG: 40 INJECTION SUBCUTANEOUS at 21:04

## 2019-01-01 RX ADMIN — FUROSEMIDE 40 MG: 40 TABLET ORAL at 09:35

## 2019-01-01 RX ADMIN — SERTRALINE 50 MG: 50 TABLET, FILM COATED ORAL at 07:39

## 2019-01-01 RX ADMIN — CARVEDILOL 6.25 MG: 6.25 TABLET, FILM COATED ORAL at 20:52

## 2019-01-01 RX ADMIN — HYDROCODONE BITARTRATE AND ACETAMINOPHEN 1 TABLET: 5; 325 TABLET ORAL at 00:43

## 2019-01-01 RX ADMIN — KETOROLAC TROMETHAMINE 15 MG: 30 INJECTION, SOLUTION INTRAMUSCULAR at 11:44

## 2019-01-01 RX ADMIN — POTASSIUM CHLORIDE 40 MEQ: 750 CAPSULE, EXTENDED RELEASE ORAL at 12:16

## 2019-01-01 RX ADMIN — DICYCLOMINE HYDROCHLORIDE 10 MG: 10 CAPSULE ORAL at 20:17

## 2019-01-01 RX ADMIN — BUSPIRONE HYDROCHLORIDE 10 MG: 10 TABLET ORAL at 21:03

## 2019-01-01 RX ADMIN — HYDROMORPHONE HYDROCHLORIDE 0.5 MG: 1 INJECTION, SOLUTION INTRAMUSCULAR; INTRAVENOUS; SUBCUTANEOUS at 11:18

## 2019-01-01 RX ADMIN — SUCRALFATE 1 G: 1 SUSPENSION ORAL at 12:12

## 2019-01-01 RX ADMIN — DICYCLOMINE HYDROCHLORIDE 10 MG: 10 CAPSULE ORAL at 17:03

## 2019-01-01 RX ADMIN — CEFAZOLIN SODIUM 2 G: 2 INJECTION, SOLUTION INTRAVENOUS at 23:11

## 2019-01-01 RX ADMIN — HYDROCODONE BITARTRATE AND ACETAMINOPHEN 2 TABLET: 7.5; 325 TABLET ORAL at 21:21

## 2019-01-01 RX ADMIN — ALPRAZOLAM 0.5 MG: 0.5 TABLET ORAL at 09:34

## 2019-01-01 RX ADMIN — IPRATROPIUM BROMIDE AND ALBUTEROL SULFATE 3 ML: 2.5; .5 SOLUTION RESPIRATORY (INHALATION) at 06:52

## 2019-01-01 RX ADMIN — CHLORHEXIDINE GLUCONATE 15 ML: 1.2 RINSE ORAL at 20:36

## 2019-01-01 RX ADMIN — ROFLUMILAST 500 MCG: 500 TABLET ORAL at 08:43

## 2019-01-01 RX ADMIN — ALBUTEROL SULFATE 6 PUFF: 90 AEROSOL, METERED RESPIRATORY (INHALATION) at 21:47

## 2019-01-01 RX ADMIN — BUDESONIDE 0.5 MG: 0.5 INHALANT RESPIRATORY (INHALATION) at 21:25

## 2019-01-01 RX ADMIN — METRONIDAZOLE 500 MG: 500 TABLET, FILM COATED ORAL at 21:28

## 2019-01-01 RX ADMIN — SUCRALFATE 0.5 G: 1 SUSPENSION ORAL at 16:47

## 2019-01-01 RX ADMIN — BUSPIRONE HYDROCHLORIDE 10 MG: 10 TABLET ORAL at 14:15

## 2019-01-01 RX ADMIN — BISACODYL 10 MG: 10 SUPPOSITORY RECTAL at 09:19

## 2019-01-01 RX ADMIN — ACETYLCYSTEINE 4 ML: 200 SOLUTION ORAL; RESPIRATORY (INHALATION) at 19:09

## 2019-01-01 RX ADMIN — BUDESONIDE 0.5 MG: 0.5 INHALANT RESPIRATORY (INHALATION) at 12:16

## 2019-01-01 RX ADMIN — IPRATROPIUM BROMIDE AND ALBUTEROL SULFATE 3 ML: 2.5; .5 SOLUTION RESPIRATORY (INHALATION) at 07:24

## 2019-01-01 RX ADMIN — IPRATROPIUM BROMIDE AND ALBUTEROL SULFATE 3 ML: 2.5; .5 SOLUTION RESPIRATORY (INHALATION) at 08:41

## 2019-01-01 RX ADMIN — DICYCLOMINE HYDROCHLORIDE 10 MG: 10 CAPSULE ORAL at 08:08

## 2019-01-01 RX ADMIN — ARFORMOTEROL TARTRATE 15 MCG: 15 SOLUTION RESPIRATORY (INHALATION) at 08:53

## 2019-01-01 RX ADMIN — HYDROCODONE BITARTRATE AND ACETAMINOPHEN 2 TABLET: 7.5; 325 TABLET ORAL at 06:45

## 2019-01-01 RX ADMIN — DICYCLOMINE HYDROCHLORIDE 10 MG: 10 CAPSULE ORAL at 20:14

## 2019-01-01 RX ADMIN — SUCRALFATE 1 G: 1 SUSPENSION ORAL at 21:49

## 2019-01-01 RX ADMIN — POTASSIUM CHLORIDE 20 MEQ: 750 CAPSULE, EXTENDED RELEASE ORAL at 21:22

## 2019-01-01 RX ADMIN — ALPRAZOLAM 0.25 MG: 0.25 TABLET ORAL at 06:21

## 2019-01-01 RX ADMIN — PROPOFOL 20 MCG/KG/MIN: 10 INJECTION, EMULSION INTRAVENOUS at 05:41

## 2019-01-01 RX ADMIN — PREDNISONE 20 MG: 20 TABLET ORAL at 08:15

## 2019-01-01 RX ADMIN — POTASSIUM CHLORIDE 20 MEQ: 750 CAPSULE, EXTENDED RELEASE ORAL at 21:03

## 2019-01-01 RX ADMIN — DICYCLOMINE HYDROCHLORIDE 10 MG: 10 CAPSULE ORAL at 15:46

## 2019-01-01 RX ADMIN — HYDROCODONE BITARTRATE AND ACETAMINOPHEN 1 TABLET: 5; 325 TABLET ORAL at 20:06

## 2019-01-01 RX ADMIN — AZITHROMYCIN MONOHYDRATE 500 MG: 500 INJECTION, POWDER, LYOPHILIZED, FOR SOLUTION INTRAVENOUS at 08:51

## 2019-01-01 RX ADMIN — DEXMEDETOMIDINE HYDROCHLORIDE 1.2 MCG/KG/HR: 100 INJECTION, SOLUTION, CONCENTRATE INTRAVENOUS at 05:34

## 2019-01-01 RX ADMIN — SUCRALFATE 1 G: 1 SUSPENSION ORAL at 12:27

## 2019-01-01 RX ADMIN — ALBUTEROL SULFATE 6 PUFF: 90 AEROSOL, METERED RESPIRATORY (INHALATION) at 10:13

## 2019-01-01 RX ADMIN — ENOXAPARIN SODIUM 40 MG: 40 INJECTION SUBCUTANEOUS at 20:11

## 2019-01-01 RX ADMIN — DEXMEDETOMIDINE HYDROCHLORIDE 1.3 MCG/KG/HR: 100 INJECTION, SOLUTION, CONCENTRATE INTRAVENOUS at 11:29

## 2019-01-01 RX ADMIN — IPRATROPIUM BROMIDE AND ALBUTEROL SULFATE 3 ML: 2.5; .5 SOLUTION RESPIRATORY (INHALATION) at 03:34

## 2019-01-01 RX ADMIN — MORPHINE SULFATE 4 MG: 2 INJECTION, SOLUTION INTRAMUSCULAR; INTRAVENOUS at 10:22

## 2019-01-01 RX ADMIN — HYDROCODONE BITARTRATE AND ACETAMINOPHEN 1 TABLET: 5; 325 TABLET ORAL at 08:08

## 2019-01-01 RX ADMIN — PREDNISONE 40 MG: 20 TABLET ORAL at 08:17

## 2019-01-01 RX ADMIN — ROFLUMILAST 500 MCG: 500 TABLET ORAL at 08:35

## 2019-01-01 RX ADMIN — SERTRALINE 50 MG: 50 TABLET, FILM COATED ORAL at 08:59

## 2019-01-01 RX ADMIN — SUCRALFATE 0.5 G: 1 SUSPENSION ORAL at 20:15

## 2019-01-01 RX ADMIN — BUSPIRONE HYDROCHLORIDE 10 MG: 10 TABLET ORAL at 14:45

## 2019-01-01 RX ADMIN — ALBUTEROL SULFATE 6 PUFF: 90 AEROSOL, METERED RESPIRATORY (INHALATION) at 19:02

## 2019-01-01 RX ADMIN — ONDANSETRON 4 MG: 2 INJECTION INTRAMUSCULAR; INTRAVENOUS at 06:02

## 2019-01-01 RX ADMIN — FENTANYL CITRATE 100 MCG: 50 INJECTION INTRAMUSCULAR; INTRAVENOUS at 08:11

## 2019-01-01 RX ADMIN — CHLORHEXIDINE GLUCONATE 15 ML: 1.2 RINSE ORAL at 20:07

## 2019-01-01 RX ADMIN — ROCURONIUM BROMIDE 50 MG: 10 INJECTION INTRAVENOUS at 08:15

## 2019-01-01 RX ADMIN — ROFLUMILAST 500 MCG: 500 TABLET ORAL at 13:20

## 2019-01-01 RX ADMIN — GUAIFENESIN 1200 MG: 600 TABLET, EXTENDED RELEASE ORAL at 20:04

## 2019-01-01 RX ADMIN — CARVEDILOL 6.25 MG: 6.25 TABLET, FILM COATED ORAL at 20:36

## 2019-01-01 RX ADMIN — CARVEDILOL 6.25 MG: 6.25 TABLET, FILM COATED ORAL at 08:04

## 2019-01-01 RX ADMIN — ARFORMOTEROL TARTRATE 15 MCG: 15 SOLUTION RESPIRATORY (INHALATION) at 19:50

## 2019-01-01 RX ADMIN — SUCRALFATE 1 G: 1 SUSPENSION ORAL at 08:09

## 2019-01-01 RX ADMIN — BUSPIRONE HYDROCHLORIDE 10 MG: 10 TABLET ORAL at 08:18

## 2019-01-01 RX ADMIN — ATORVASTATIN CALCIUM 10 MG: 10 TABLET, FILM COATED ORAL at 17:26

## 2019-01-01 RX ADMIN — GLYCOPYRROLATE 0.4 MG: 0.2 INJECTION INTRAMUSCULAR; INTRAVENOUS at 17:45

## 2019-01-01 RX ADMIN — FAMOTIDINE 20 MG: 10 INJECTION INTRAVENOUS at 07:51

## 2019-01-01 RX ADMIN — MORPHINE SULFATE 4 MG: 2 INJECTION, SOLUTION INTRAMUSCULAR; INTRAVENOUS at 16:47

## 2019-01-01 RX ADMIN — ALPRAZOLAM 0.25 MG: 0.25 TABLET ORAL at 15:42

## 2019-01-01 RX ADMIN — SUCRALFATE 1 G: 1 SUSPENSION ORAL at 08:15

## 2019-01-01 RX ADMIN — HYDROCODONE BITARTRATE AND ACETAMINOPHEN 1 TABLET: 5; 325 TABLET ORAL at 10:10

## 2019-01-01 RX ADMIN — IPRATROPIUM BROMIDE AND ALBUTEROL SULFATE 3 ML: 2.5; .5 SOLUTION RESPIRATORY (INHALATION) at 20:31

## 2019-01-01 RX ADMIN — POTASSIUM CHLORIDE 20 MEQ: 750 CAPSULE, EXTENDED RELEASE ORAL at 08:00

## 2019-01-01 RX ADMIN — CEFAZOLIN SODIUM 2 G: 2 INJECTION, SOLUTION INTRAVENOUS at 00:09

## 2019-01-01 RX ADMIN — KETOROLAC TROMETHAMINE 15 MG: 30 INJECTION, SOLUTION INTRAMUSCULAR at 01:36

## 2019-01-01 RX ADMIN — SUCRALFATE 1 G: 1 SUSPENSION ORAL at 08:08

## 2019-01-01 RX ADMIN — POTASSIUM CHLORIDE 40 MEQ: 750 CAPSULE, EXTENDED RELEASE ORAL at 09:35

## 2019-01-01 RX ADMIN — CARVEDILOL 6.25 MG: 6.25 TABLET, FILM COATED ORAL at 08:35

## 2019-01-01 RX ADMIN — SUCRALFATE 1 G: 1 SUSPENSION ORAL at 09:35

## 2019-01-01 RX ADMIN — ALBUTEROL SULFATE 2.5 MG: 2.5 SOLUTION RESPIRATORY (INHALATION) at 20:42

## 2019-01-01 RX ADMIN — LOSARTAN POTASSIUM 25 MG: 25 TABLET, FILM COATED ORAL at 08:00

## 2019-01-01 RX ADMIN — METHYLPREDNISOLONE SODIUM SUCCINATE 40 MG: 125 INJECTION, POWDER, FOR SOLUTION INTRAMUSCULAR; INTRAVENOUS at 11:06

## 2019-01-01 RX ADMIN — ALPRAZOLAM 0.5 MG: 0.5 TABLET ORAL at 21:23

## 2019-01-01 RX ADMIN — ALPRAZOLAM 0.5 MG: 0.5 TABLET ORAL at 20:06

## 2019-01-01 RX ADMIN — HYDROCODONE BITARTRATE AND ACETAMINOPHEN 1 TABLET: 7.5; 325 TABLET ORAL at 09:04

## 2019-01-01 RX ADMIN — PROPOFOL 40 MCG/KG/MIN: 10 INJECTION, EMULSION INTRAVENOUS at 08:07

## 2019-01-01 RX ADMIN — PROPOFOL 20 MCG/KG/MIN: 10 INJECTION, EMULSION INTRAVENOUS at 15:56

## 2019-01-01 RX ADMIN — SERTRALINE 50 MG: 50 TABLET, FILM COATED ORAL at 08:36

## 2019-01-01 RX ADMIN — ALBUTEROL SULFATE 6 PUFF: 90 AEROSOL, METERED RESPIRATORY (INHALATION) at 12:50

## 2019-01-01 RX ADMIN — POTASSIUM CHLORIDE 20 MEQ: 750 CAPSULE, EXTENDED RELEASE ORAL at 20:36

## 2019-01-01 RX ADMIN — HYDROCODONE BITARTRATE AND ACETAMINOPHEN 2 TABLET: 7.5; 325 TABLET ORAL at 23:05

## 2019-01-01 RX ADMIN — ACETYLCYSTEINE 4 ML: 200 SOLUTION ORAL; RESPIRATORY (INHALATION) at 06:32

## 2019-01-01 RX ADMIN — DEXMEDETOMIDINE HYDROCHLORIDE 1.2 MCG/KG/HR: 100 INJECTION, SOLUTION, CONCENTRATE INTRAVENOUS at 22:48

## 2019-01-01 RX ADMIN — POTASSIUM CHLORIDE 20 MEQ: 750 CAPSULE, EXTENDED RELEASE ORAL at 20:46

## 2019-01-01 RX ADMIN — BUDESONIDE 0.5 MG: 0.5 INHALANT RESPIRATORY (INHALATION) at 19:50

## 2019-01-01 RX ADMIN — ARFORMOTEROL TARTRATE 15 MCG: 15 SOLUTION RESPIRATORY (INHALATION) at 23:12

## 2019-01-01 RX ADMIN — KETOROLAC TROMETHAMINE 15 MG: 30 INJECTION, SOLUTION INTRAMUSCULAR at 08:26

## 2019-01-01 RX ADMIN — OXYCODONE HYDROCHLORIDE AND ACETAMINOPHEN 1 TABLET: 7.5; 325 TABLET ORAL at 01:43

## 2019-01-01 RX ADMIN — BUDESONIDE 0.5 MG: 0.5 INHALANT RESPIRATORY (INHALATION) at 07:53

## 2019-01-01 RX ADMIN — ALPRAZOLAM 0.5 MG: 0.5 TABLET ORAL at 10:03

## 2019-01-01 RX ADMIN — SUCRALFATE 1 G: 1 SUSPENSION ORAL at 20:52

## 2019-01-01 RX ADMIN — SODIUM CHLORIDE, POTASSIUM CHLORIDE, SODIUM LACTATE AND CALCIUM CHLORIDE 9 ML/HR: 600; 310; 30; 20 INJECTION, SOLUTION INTRAVENOUS at 07:46

## 2019-01-01 RX ADMIN — APIXABAN 10 MG: 5 TABLET, FILM COATED ORAL at 12:16

## 2019-01-01 RX ADMIN — ROFLUMILAST 500 MCG: 500 TABLET ORAL at 10:19

## 2019-01-01 RX ADMIN — FENTANYL CITRATE 25 MCG: 50 INJECTION INTRAMUSCULAR; INTRAVENOUS at 05:51

## 2019-01-01 RX ADMIN — METRONIDAZOLE 500 MG: 500 TABLET, FILM COATED ORAL at 10:01

## 2019-01-01 RX ADMIN — HYDROCODONE BITARTRATE AND ACETAMINOPHEN 2 TABLET: 7.5; 325 TABLET ORAL at 20:14

## 2019-01-01 RX ADMIN — DEXMEDETOMIDINE HYDROCHLORIDE 1.2 MCG/KG/HR: 100 INJECTION, SOLUTION, CONCENTRATE INTRAVENOUS at 17:00

## 2019-01-01 RX ADMIN — CHLORHEXIDINE GLUCONATE 15 ML: 1.2 RINSE ORAL at 09:08

## 2019-01-01 RX ADMIN — SENNOSIDES AND DOCUSATE SODIUM 1 TABLET: 8.6; 5 TABLET ORAL at 14:14

## 2019-01-01 RX ADMIN — SUCRALFATE 1 G: 1 SUSPENSION ORAL at 12:57

## 2019-01-01 RX ADMIN — APIXABAN 5 MG: 5 TABLET, FILM COATED ORAL at 08:15

## 2019-01-01 RX ADMIN — SUCRALFATE 1 G: 1 SUSPENSION ORAL at 17:26

## 2019-01-01 RX ADMIN — GLYCOPYRROLATE 0.4 MG: 0.2 INJECTION INTRAMUSCULAR; INTRAVENOUS at 21:01

## 2019-01-01 RX ADMIN — KETOROLAC TROMETHAMINE 15 MG: 30 INJECTION, SOLUTION INTRAMUSCULAR at 20:14

## 2019-01-01 RX ADMIN — ACETYLCYSTEINE 4 ML: 200 SOLUTION ORAL; RESPIRATORY (INHALATION) at 14:50

## 2019-01-01 RX ADMIN — BUSPIRONE HYDROCHLORIDE 10 MG: 10 TABLET ORAL at 20:36

## 2019-01-01 RX ADMIN — CARVEDILOL 6.25 MG: 6.25 TABLET, FILM COATED ORAL at 20:06

## 2019-01-01 RX ADMIN — LANSOPRAZOLE 30 MG: KIT at 06:12

## 2019-01-01 RX ADMIN — SODIUM CHLORIDE, PRESERVATIVE FREE 3 ML: 5 INJECTION INTRAVENOUS at 08:12

## 2019-01-01 RX ADMIN — OXYCODONE HYDROCHLORIDE AND ACETAMINOPHEN 1 TABLET: 7.5; 325 TABLET ORAL at 03:15

## 2019-01-01 RX ADMIN — BUSPIRONE HYDROCHLORIDE 10 MG: 10 TABLET ORAL at 08:51

## 2019-01-01 RX ADMIN — DICYCLOMINE HYDROCHLORIDE 10 MG: 10 CAPSULE ORAL at 20:04

## 2019-01-01 RX ADMIN — FENTANYL CITRATE 50 MCG: 50 INJECTION INTRAMUSCULAR; INTRAVENOUS at 16:58

## 2019-01-01 RX ADMIN — HYDROCODONE BITARTRATE AND ACETAMINOPHEN 1 TABLET: 5; 325 TABLET ORAL at 08:07

## 2019-01-01 RX ADMIN — ALPRAZOLAM 0.5 MG: 0.5 TABLET ORAL at 08:07

## 2019-01-01 RX ADMIN — ALPRAZOLAM 0.5 MG: 0.5 TABLET ORAL at 22:48

## 2019-01-01 RX ADMIN — MORPHINE SULFATE 4 MG: 2 INJECTION, SOLUTION INTRAMUSCULAR; INTRAVENOUS at 12:25

## 2019-01-01 RX ADMIN — PROPOFOL 40 MCG/KG/MIN: 10 INJECTION, EMULSION INTRAVENOUS at 06:03

## 2019-01-01 RX ADMIN — BUDESONIDE 0.5 MG: 0.5 SUSPENSION RESPIRATORY (INHALATION) at 10:21

## 2019-01-01 RX ADMIN — METHYLPREDNISOLONE SODIUM SUCCINATE 40 MG: 40 INJECTION, POWDER, FOR SOLUTION INTRAMUSCULAR; INTRAVENOUS at 17:24

## 2019-01-01 RX ADMIN — DICYCLOMINE HYDROCHLORIDE 10 MG: 10 CAPSULE ORAL at 20:36

## 2019-01-01 RX ADMIN — BUSPIRONE HYDROCHLORIDE 10 MG: 10 TABLET ORAL at 20:13

## 2019-01-01 RX ADMIN — LOSARTAN POTASSIUM 25 MG: 25 TABLET, FILM COATED ORAL at 08:35

## 2019-01-01 RX ADMIN — FENTANYL CITRATE 50 MCG: 50 INJECTION INTRAMUSCULAR; INTRAVENOUS at 19:12

## 2019-01-01 RX ADMIN — SUCRALFATE 1 G: 1 SUSPENSION ORAL at 13:38

## 2019-01-01 RX ADMIN — SERTRALINE 50 MG: 50 TABLET, FILM COATED ORAL at 08:43

## 2019-01-01 RX ADMIN — IPRATROPIUM BROMIDE AND ALBUTEROL SULFATE 3 ML: 2.5; .5 SOLUTION RESPIRATORY (INHALATION) at 11:22

## 2019-01-01 RX ADMIN — INSULIN LISPRO 2 UNITS: 100 INJECTION, SOLUTION INTRAVENOUS; SUBCUTANEOUS at 18:42

## 2019-01-01 RX ADMIN — ACETYLCYSTEINE 4 ML: 200 SOLUTION ORAL; RESPIRATORY (INHALATION) at 07:01

## 2019-01-01 RX ADMIN — METHYLPREDNISOLONE SODIUM SUCCINATE 40 MG: 125 INJECTION, POWDER, FOR SOLUTION INTRAMUSCULAR; INTRAVENOUS at 17:26

## 2019-01-01 RX ADMIN — GUAIFENESIN 1200 MG: 600 TABLET, EXTENDED RELEASE ORAL at 08:01

## 2019-01-01 RX ADMIN — BUSPIRONE HYDROCHLORIDE 10 MG: 10 TABLET ORAL at 20:06

## 2019-01-01 RX ADMIN — BUDESONIDE 0.5 MG: 0.5 INHALANT RESPIRATORY (INHALATION) at 08:25

## 2019-01-01 RX ADMIN — HYDROCODONE BITARTRATE AND ACETAMINOPHEN 2 TABLET: 7.5; 325 TABLET ORAL at 11:49

## 2019-01-01 RX ADMIN — ACETYLCYSTEINE 4 ML: 200 SOLUTION ORAL; RESPIRATORY (INHALATION) at 20:23

## 2019-01-01 RX ADMIN — SUCRALFATE 1 G: 1 SUSPENSION ORAL at 08:17

## 2019-01-01 RX ADMIN — SUCRALFATE 1 G: 1 SUSPENSION ORAL at 16:30

## 2019-01-01 RX ADMIN — CARVEDILOL 6.25 MG: 6.25 TABLET, FILM COATED ORAL at 21:27

## 2019-01-01 RX ADMIN — SUCRALFATE 0.5 G: 1 SUSPENSION ORAL at 17:22

## 2019-01-01 RX ADMIN — ALPRAZOLAM 0.25 MG: 0.25 TABLET ORAL at 17:58

## 2019-01-01 RX ADMIN — HYDROCODONE BITARTRATE AND ACETAMINOPHEN 1 TABLET: 7.5; 325 TABLET ORAL at 10:18

## 2019-01-01 RX ADMIN — ALPRAZOLAM 0.25 MG: 0.25 TABLET ORAL at 06:31

## 2019-01-01 RX ADMIN — METHYLPREDNISOLONE SODIUM SUCCINATE 40 MG: 40 INJECTION, POWDER, FOR SOLUTION INTRAMUSCULAR; INTRAVENOUS at 09:34

## 2019-01-01 RX ADMIN — MORPHINE SULFATE 4 MG: 2 INJECTION, SOLUTION INTRAMUSCULAR; INTRAVENOUS at 13:46

## 2019-01-01 RX ADMIN — IPRATROPIUM BROMIDE AND ALBUTEROL SULFATE 3 ML: 2.5; .5 SOLUTION RESPIRATORY (INHALATION) at 23:08

## 2019-01-01 RX ADMIN — HYDROCORTISONE SODIUM SUCCINATE 100 MG: 100 INJECTION, POWDER, FOR SOLUTION INTRAMUSCULAR; INTRAVENOUS at 07:52

## 2019-01-01 RX ADMIN — ALBUTEROL SULFATE 2.5 MG: 2.5 SOLUTION RESPIRATORY (INHALATION) at 07:52

## 2019-01-01 RX ADMIN — ALBUTEROL SULFATE 6 PUFF: 90 AEROSOL, METERED RESPIRATORY (INHALATION) at 07:42

## 2019-01-01 RX ADMIN — SODIUM CHLORIDE, PRESERVATIVE FREE 3 ML: 5 INJECTION INTRAVENOUS at 20:36

## 2019-01-01 RX ADMIN — ALBUTEROL SULFATE 2.5 MG: 2.5 SOLUTION RESPIRATORY (INHALATION) at 11:59

## 2019-01-01 RX ADMIN — MAGNESIUM SULFATE HEPTAHYDRATE 1 G: 1 INJECTION, SOLUTION INTRAVENOUS at 12:45

## 2019-01-01 RX ADMIN — LOPERAMIDE HYDROCHLORIDE 2 MG: 2 CAPSULE ORAL at 12:59

## 2019-01-01 RX ADMIN — FENTANYL CITRATE 25 MCG: 50 INJECTION INTRAMUSCULAR; INTRAVENOUS at 04:13

## 2019-01-01 RX ADMIN — MORPHINE SULFATE 4 MG: 2 INJECTION, SOLUTION INTRAMUSCULAR; INTRAVENOUS at 00:28

## 2019-01-01 RX ADMIN — LOSARTAN POTASSIUM 25 MG: 25 TABLET, FILM COATED ORAL at 08:15

## 2019-01-01 RX ADMIN — GUAIFENESIN 1200 MG: 600 TABLET, EXTENDED RELEASE ORAL at 20:17

## 2019-01-01 RX ADMIN — FENTANYL CITRATE 50 MCG: 50 INJECTION INTRAMUSCULAR; INTRAVENOUS at 15:25

## 2019-01-01 RX ADMIN — ALPRAZOLAM 0.5 MG: 0.5 TABLET ORAL at 23:43

## 2019-01-01 RX ADMIN — SUCRALFATE 0.5 G: 1 SUSPENSION ORAL at 16:29

## 2019-01-01 RX ADMIN — DICYCLOMINE HYDROCHLORIDE 10 MG: 10 CAPSULE ORAL at 08:17

## 2019-01-01 RX ADMIN — POTASSIUM CHLORIDE AND SODIUM CHLORIDE 50 ML/HR: 900; 150 INJECTION, SOLUTION INTRAVENOUS at 21:43

## 2019-01-01 RX ADMIN — ENOXAPARIN SODIUM 30 MG: 30 INJECTION SUBCUTANEOUS at 21:02

## 2019-01-01 RX ADMIN — DICYCLOMINE HYDROCHLORIDE 10 MG: 10 CAPSULE ORAL at 12:27

## 2019-01-01 RX ADMIN — FAMOTIDINE 20 MG: 10 INJECTION INTRAVENOUS at 15:26

## 2019-01-01 RX ADMIN — SERTRALINE 50 MG: 50 TABLET, FILM COATED ORAL at 08:50

## 2019-01-01 RX ADMIN — PREDNISONE 20 MG: 20 TABLET ORAL at 07:38

## 2019-01-01 RX ADMIN — DEXMEDETOMIDINE HYDROCHLORIDE 1.2 MCG/KG/HR: 100 INJECTION, SOLUTION, CONCENTRATE INTRAVENOUS at 05:30

## 2019-01-01 RX ADMIN — ENOXAPARIN SODIUM 30 MG: 30 INJECTION SUBCUTANEOUS at 20:06

## 2019-01-01 RX ADMIN — DEXMEDETOMIDINE HYDROCHLORIDE 1.2 MCG/KG/HR: 100 INJECTION, SOLUTION, CONCENTRATE INTRAVENOUS at 21:16

## 2019-01-01 RX ADMIN — VANCOMYCIN HYDROCHLORIDE 1000 MG: 1 INJECTION, SOLUTION INTRAVENOUS at 16:34

## 2019-01-01 RX ADMIN — ONDANSETRON 4 MG: 2 INJECTION INTRAMUSCULAR; INTRAVENOUS at 17:44

## 2019-01-01 RX ADMIN — ALPRAZOLAM 0.25 MG: 0.25 TABLET ORAL at 20:04

## 2019-01-01 RX ADMIN — GUAIFENESIN 1200 MG: 600 TABLET, EXTENDED RELEASE ORAL at 08:17

## 2019-01-01 RX ADMIN — IPRATROPIUM BROMIDE AND ALBUTEROL SULFATE 3 ML: 2.5; .5 SOLUTION RESPIRATORY (INHALATION) at 07:15

## 2019-01-01 RX ADMIN — BUSPIRONE HYDROCHLORIDE 10 MG: 10 TABLET ORAL at 20:19

## 2019-01-01 RX ADMIN — HYDROCODONE BITARTRATE AND ACETAMINOPHEN 2 TABLET: 7.5; 325 TABLET ORAL at 01:55

## 2019-01-01 RX ADMIN — METRONIDAZOLE 500 MG: 500 TABLET, FILM COATED ORAL at 21:22

## 2019-01-01 RX ADMIN — HYDROCODONE BITARTRATE AND ACETAMINOPHEN 2 TABLET: 7.5; 325 TABLET ORAL at 10:34

## 2019-01-01 RX ADMIN — HYDROCODONE BITARTRATE AND ACETAMINOPHEN 1 TABLET: 7.5; 325 TABLET ORAL at 17:48

## 2019-01-01 RX ADMIN — DICYCLOMINE HYDROCHLORIDE 10 MG: 10 CAPSULE ORAL at 21:27

## 2019-01-01 RX ADMIN — ACETYLCYSTEINE 4 ML: 200 SOLUTION ORAL; RESPIRATORY (INHALATION) at 19:18

## 2019-01-01 RX ADMIN — ROFLUMILAST 500 MCG: 500 TABLET ORAL at 08:18

## 2019-01-01 RX ADMIN — SUCRALFATE 0.5 G: 1 SUSPENSION ORAL at 08:57

## 2019-01-01 RX ADMIN — ALBUTEROL SULFATE 6 PUFF: 90 AEROSOL, METERED RESPIRATORY (INHALATION) at 23:23

## 2019-01-01 RX ADMIN — CARVEDILOL 6.25 MG: 3.12 TABLET, FILM COATED ORAL at 20:29

## 2019-01-01 RX ADMIN — DICYCLOMINE HYDROCHLORIDE 10 MG: 10 CAPSULE ORAL at 17:10

## 2019-01-01 RX ADMIN — DICYCLOMINE HYDROCHLORIDE 10 MG: 10 CAPSULE ORAL at 16:22

## 2019-01-01 RX ADMIN — DEXMEDETOMIDINE HYDROCHLORIDE 1 MCG/KG/HR: 100 INJECTION, SOLUTION, CONCENTRATE INTRAVENOUS at 03:40

## 2019-01-01 RX ADMIN — SUCRALFATE 0.5 G: 1 SUSPENSION ORAL at 21:28

## 2019-01-01 RX ADMIN — TIOTROPIUM BROMIDE INHALATION SPRAY 2 PUFF: 3.12 SPRAY, METERED RESPIRATORY (INHALATION) at 08:53

## 2019-01-01 RX ADMIN — METHYLPREDNISOLONE SODIUM SUCCINATE 125 MG: 125 INJECTION, POWDER, FOR SOLUTION INTRAMUSCULAR; INTRAVENOUS at 17:18

## 2019-01-01 RX ADMIN — CARVEDILOL 6.25 MG: 6.25 TABLET, FILM COATED ORAL at 08:20

## 2019-01-01 RX ADMIN — VANCOMYCIN HYDROCHLORIDE 1000 MG: 1 INJECTION, SOLUTION INTRAVENOUS at 07:46

## 2019-01-01 RX ADMIN — ALBUTEROL SULFATE 6 PUFF: 90 AEROSOL, METERED RESPIRATORY (INHALATION) at 03:24

## 2019-01-01 RX ADMIN — CEFEPIME HYDROCHLORIDE 2 G: 2 INJECTION, POWDER, FOR SOLUTION INTRAVENOUS at 18:55

## 2019-01-01 RX ADMIN — POTASSIUM CHLORIDE 20 MEQ: 750 CAPSULE, EXTENDED RELEASE ORAL at 20:14

## 2019-01-01 RX ADMIN — ARFORMOTEROL TARTRATE 15 MCG: 15 SOLUTION RESPIRATORY (INHALATION) at 19:39

## 2019-01-01 RX ADMIN — SODIUM CHLORIDE 0.4 MCG/KG/HR: 9 INJECTION, SOLUTION INTRAVENOUS at 18:20

## 2019-01-01 RX ADMIN — ALBUTEROL SULFATE 6 PUFF: 90 AEROSOL, METERED RESPIRATORY (INHALATION) at 06:34

## 2019-01-01 RX ADMIN — ONDANSETRON 4 MG: 2 INJECTION INTRAMUSCULAR; INTRAVENOUS at 22:44

## 2019-01-01 RX ADMIN — ALPRAZOLAM 0.25 MG: 0.25 TABLET ORAL at 15:05

## 2019-01-01 RX ADMIN — BUDESONIDE 0.5 MG: 0.5 INHALANT RESPIRATORY (INHALATION) at 08:43

## 2019-01-01 RX ADMIN — BUSPIRONE HYDROCHLORIDE 10 MG: 10 TABLET ORAL at 08:26

## 2019-01-01 RX ADMIN — PANTOPRAZOLE SODIUM 40 MG: 40 TABLET, DELAYED RELEASE ORAL at 07:38

## 2019-01-01 RX ADMIN — ALPRAZOLAM 0.5 MG: 0.5 TABLET ORAL at 14:39

## 2019-01-01 RX ADMIN — SODIUM CHLORIDE, PRESERVATIVE FREE 3 ML: 5 INJECTION INTRAVENOUS at 09:36

## 2019-01-01 RX ADMIN — POTASSIUM CHLORIDE 20 MEQ: 750 CAPSULE, EXTENDED RELEASE ORAL at 08:20

## 2019-01-01 RX ADMIN — GUAIFENESIN 1200 MG: 600 TABLET, EXTENDED RELEASE ORAL at 08:08

## 2019-01-01 RX ADMIN — PANTOPRAZOLE SODIUM 40 MG: 40 TABLET, DELAYED RELEASE ORAL at 08:17

## 2019-01-01 RX ADMIN — POTASSIUM CHLORIDE 10 MEQ: 7.46 INJECTION, SOLUTION INTRAVENOUS at 10:08

## 2019-01-01 RX ADMIN — ALBUTEROL SULFATE 6 PUFF: 90 AEROSOL, METERED RESPIRATORY (INHALATION) at 06:32

## 2019-01-01 RX ADMIN — SUCRALFATE 0.5 G: 1 SUSPENSION ORAL at 11:49

## 2019-01-01 RX ADMIN — ALPRAZOLAM 0.25 MG: 0.25 TABLET ORAL at 05:35

## 2019-01-01 RX ADMIN — HYDROCODONE BITARTRATE AND ACETAMINOPHEN 1 TABLET: 7.5; 325 TABLET ORAL at 19:31

## 2019-01-01 RX ADMIN — ALBUTEROL SULFATE 6 PUFF: 90 AEROSOL, METERED RESPIRATORY (INHALATION) at 19:17

## 2019-01-01 RX ADMIN — PROPOFOL 25 MCG/KG/MIN: 10 INJECTION, EMULSION INTRAVENOUS at 13:49

## 2019-01-01 RX ADMIN — HYDROCODONE BITARTRATE AND ACETAMINOPHEN 2 TABLET: 5; 325 TABLET ORAL at 09:26

## 2019-01-01 RX ADMIN — ALPRAZOLAM 0.5 MG: 0.5 TABLET ORAL at 15:42

## 2019-01-01 RX ADMIN — ROFLUMILAST 500 MCG: 500 TABLET ORAL at 09:35

## 2019-01-01 RX ADMIN — GUAIFENESIN 1200 MG: 600 TABLET, EXTENDED RELEASE ORAL at 20:13

## 2019-01-01 RX ADMIN — LANSOPRAZOLE 30 MG: KIT at 07:54

## 2019-01-01 RX ADMIN — CARVEDILOL 6.25 MG: 6.25 TABLET, FILM COATED ORAL at 08:08

## 2019-01-01 RX ADMIN — ACETAMINOPHEN 1000 MG: 500 TABLET, FILM COATED ORAL at 07:45

## 2019-01-01 RX ADMIN — INSULIN LISPRO 2 UNITS: 100 INJECTION, SOLUTION INTRAVENOUS; SUBCUTANEOUS at 00:04

## 2019-01-01 RX ADMIN — HYDROCODONE BITARTRATE AND ACETAMINOPHEN 2 TABLET: 7.5; 325 TABLET ORAL at 06:33

## 2019-01-01 RX ADMIN — ALPRAZOLAM 0.25 MG: 0.25 TABLET ORAL at 21:27

## 2019-01-01 RX ADMIN — PANTOPRAZOLE SODIUM 40 MG: 40 TABLET, DELAYED RELEASE ORAL at 08:18

## 2019-01-01 RX ADMIN — CEPHALEXIN 500 MG: 500 CAPSULE ORAL at 10:36

## 2019-01-01 RX ADMIN — FAMOTIDINE 40 MG: 40 TABLET, FILM COATED ORAL at 08:15

## 2019-01-01 RX ADMIN — TIOTROPIUM BROMIDE INHALATION SPRAY 2 PUFF: 3.12 SPRAY, METERED RESPIRATORY (INHALATION) at 07:54

## 2019-01-01 RX ADMIN — BUSPIRONE HYDROCHLORIDE 10 MG: 10 TABLET ORAL at 20:11

## 2019-01-01 RX ADMIN — SODIUM CHLORIDE, PRESERVATIVE FREE 3 ML: 5 INJECTION INTRAVENOUS at 20:52

## 2019-01-01 RX ADMIN — DEXMEDETOMIDINE HYDROCHLORIDE 1.2 MCG/KG/HR: 100 INJECTION, SOLUTION, CONCENTRATE INTRAVENOUS at 17:11

## 2019-01-01 RX ADMIN — SUCRALFATE 0.5 G: 1 SUSPENSION ORAL at 18:02

## 2019-01-01 RX ADMIN — LOSARTAN POTASSIUM 50 MG: 50 TABLET, FILM COATED ORAL at 07:39

## 2019-01-01 RX ADMIN — ATORVASTATIN CALCIUM 10 MG: 10 TABLET, FILM COATED ORAL at 17:03

## 2019-01-01 RX ADMIN — DICYCLOMINE HYDROCHLORIDE 10 MG: 10 CAPSULE ORAL at 08:06

## 2019-01-01 RX ADMIN — BUDESONIDE 0.5 MG: 0.5 INHALANT RESPIRATORY (INHALATION) at 19:07

## 2019-01-01 RX ADMIN — GUAIFENESIN 1200 MG: 600 TABLET, EXTENDED RELEASE ORAL at 08:35

## 2019-01-01 RX ADMIN — ENOXAPARIN SODIUM 50 MG: 60 INJECTION SUBCUTANEOUS at 08:18

## 2019-01-01 RX ADMIN — BUDESONIDE 0.5 MG: 0.5 INHALANT RESPIRATORY (INHALATION) at 23:12

## 2019-01-01 RX ADMIN — DICYCLOMINE HYDROCHLORIDE 10 MG: 10 CAPSULE ORAL at 16:11

## 2019-01-01 RX ADMIN — POTASSIUM CHLORIDE 10 MEQ: 7.46 INJECTION, SOLUTION INTRAVENOUS at 01:21

## 2019-01-01 RX ADMIN — ASPIRIN 81 MG: 81 TABLET, COATED ORAL at 08:19

## 2019-01-01 RX ADMIN — SODIUM CHLORIDE 750 MG: 900 INJECTION, SOLUTION INTRAVENOUS at 23:02

## 2019-01-01 RX ADMIN — LOSARTAN POTASSIUM 25 MG: 25 TABLET, FILM COATED ORAL at 08:59

## 2019-01-01 RX ADMIN — SUCRALFATE 0.5 G: 1 SUSPENSION ORAL at 05:38

## 2019-01-01 RX ADMIN — SERTRALINE 50 MG: 50 TABLET, FILM COATED ORAL at 14:16

## 2019-01-01 RX ADMIN — LIDOCAINE HYDROCHLORIDE: 40 SOLUTION TOPICAL at 11:15

## 2019-01-01 RX ADMIN — SERTRALINE 50 MG: 50 TABLET, FILM COATED ORAL at 10:03

## 2019-01-01 RX ADMIN — PREDNISONE 20 MG: 20 TABLET ORAL at 08:20

## 2019-01-01 RX ADMIN — BUSPIRONE HYDROCHLORIDE 10 MG: 10 TABLET ORAL at 08:08

## 2019-01-01 RX ADMIN — LOSARTAN POTASSIUM 50 MG: 50 TABLET, FILM COATED ORAL at 08:22

## 2019-01-01 RX ADMIN — ALBUTEROL SULFATE 6 PUFF: 90 AEROSOL, METERED RESPIRATORY (INHALATION) at 10:47

## 2019-01-01 RX ADMIN — PROPOFOL 5 MCG/KG/MIN: 10 INJECTION, EMULSION INTRAVENOUS at 02:36

## 2019-01-01 RX ADMIN — ALPRAZOLAM 0.25 MG: 0.25 TABLET ORAL at 11:32

## 2019-01-01 RX ADMIN — GUAIFENESIN 1200 MG: 600 TABLET, EXTENDED RELEASE ORAL at 21:28

## 2019-01-01 RX ADMIN — HYDROCODONE BITARTRATE AND ACETAMINOPHEN 2 TABLET: 7.5; 325 TABLET ORAL at 03:45

## 2019-01-01 RX ADMIN — FERROUS SULFATE TAB 325 MG (65 MG ELEMENTAL FE) 325 MG: 325 (65 FE) TAB at 07:56

## 2019-01-01 RX ADMIN — SUCRALFATE 0.5 G: 1 SUSPENSION ORAL at 20:46

## 2019-01-01 RX ADMIN — ATORVASTATIN CALCIUM 10 MG: 10 TABLET, FILM COATED ORAL at 16:29

## 2019-01-01 RX ADMIN — DICYCLOMINE HYDROCHLORIDE 10 MG: 10 CAPSULE ORAL at 12:15

## 2019-01-01 RX ADMIN — CEPHALEXIN 500 MG: 500 CAPSULE ORAL at 08:04

## 2019-01-01 RX ADMIN — POTASSIUM CHLORIDE 20 MEQ: 750 CAPSULE, EXTENDED RELEASE ORAL at 20:38

## 2019-01-01 RX ADMIN — LIDOCAINE HYDROCHLORIDE 60 MG: 20 INJECTION, SOLUTION INFILTRATION; PERINEURAL at 22:06

## 2019-01-01 RX ADMIN — ALBUTEROL SULFATE 2.5 MG: 2.5 SOLUTION RESPIRATORY (INHALATION) at 15:45

## 2019-01-01 RX ADMIN — CEFAZOLIN SODIUM 2 G: 2 INJECTION, SOLUTION INTRAVENOUS at 14:15

## 2019-01-01 RX ADMIN — ALPRAZOLAM 0.5 MG: 0.5 TABLET ORAL at 08:08

## 2019-01-01 RX ADMIN — ARFORMOTEROL TARTRATE 15 MCG: 15 SOLUTION RESPIRATORY (INHALATION) at 19:07

## 2019-01-01 RX ADMIN — LOPERAMIDE HYDROCHLORIDE 2 MG: 2 CAPSULE ORAL at 01:06

## 2019-01-01 RX ADMIN — SERTRALINE 50 MG: 50 TABLET, FILM COATED ORAL at 08:01

## 2019-01-01 RX ADMIN — LOSARTAN POTASSIUM 50 MG: 50 TABLET, FILM COATED ORAL at 08:09

## 2019-01-01 RX ADMIN — OXYCODONE HYDROCHLORIDE AND ACETAMINOPHEN 1 TABLET: 7.5; 325 TABLET ORAL at 13:35

## 2019-01-01 RX ADMIN — ASPIRIN 81 MG: 81 TABLET, DELAYED RELEASE ORAL at 08:22

## 2019-01-01 RX ADMIN — SUGAMMADEX 150 MG: 100 INJECTION, SOLUTION INTRAVENOUS at 09:37

## 2019-01-01 RX ADMIN — SUCRALFATE 0.5 G: 1 SUSPENSION ORAL at 11:28

## 2019-01-01 RX ADMIN — METHYLPREDNISOLONE SODIUM SUCCINATE 40 MG: 40 INJECTION, POWDER, FOR SOLUTION INTRAMUSCULAR; INTRAVENOUS at 00:33

## 2019-01-01 RX ADMIN — DICYCLOMINE HYDROCHLORIDE 10 MG: 10 CAPSULE ORAL at 08:00

## 2019-01-01 RX ADMIN — KETOROLAC TROMETHAMINE 15 MG: 30 INJECTION, SOLUTION INTRAMUSCULAR at 08:19

## 2019-01-01 RX ADMIN — HYDROCODONE BITARTRATE AND ACETAMINOPHEN 2 TABLET: 7.5; 325 TABLET ORAL at 20:53

## 2019-01-01 RX ADMIN — HYDROCODONE BITARTRATE AND ACETAMINOPHEN 1 TABLET: 5; 325 TABLET ORAL at 02:22

## 2019-01-01 RX ADMIN — MORPHINE SULFATE 4 MG: 2 INJECTION, SOLUTION INTRAMUSCULAR; INTRAVENOUS at 11:49

## 2019-01-01 RX ADMIN — CEFTRIAXONE SODIUM 1 G: 1 INJECTION, SOLUTION INTRAVENOUS at 12:18

## 2019-01-01 RX ADMIN — LOPERAMIDE HYDROCHLORIDE 2 MG: 2 CAPSULE ORAL at 03:38

## 2019-01-01 RX ADMIN — PREDNISONE 20 MG: 20 TABLET ORAL at 09:34

## 2019-01-01 RX ADMIN — DICYCLOMINE HYDROCHLORIDE 10 MG: 10 CAPSULE ORAL at 08:43

## 2019-01-01 RX ADMIN — ONDANSETRON HYDROCHLORIDE 4 MG: 2 SOLUTION INTRAMUSCULAR; INTRAVENOUS at 20:47

## 2019-01-01 RX ADMIN — CARVEDILOL 6.25 MG: 6.25 TABLET, FILM COATED ORAL at 08:18

## 2019-01-01 RX ADMIN — SUCRALFATE 1 G: 1 SUSPENSION ORAL at 17:03

## 2019-01-01 RX ADMIN — SERTRALINE 50 MG: 50 TABLET, FILM COATED ORAL at 08:19

## 2019-01-01 RX ADMIN — HYDROCODONE BITARTRATE AND ACETAMINOPHEN 2 TABLET: 7.5; 325 TABLET ORAL at 00:47

## 2019-01-01 RX ADMIN — SODIUM CHLORIDE, PRESERVATIVE FREE 3 ML: 5 INJECTION INTRAVENOUS at 08:09

## 2019-01-01 RX ADMIN — IPRATROPIUM BROMIDE AND ALBUTEROL SULFATE 3 ML: 2.5; .5 SOLUTION RESPIRATORY (INHALATION) at 23:06

## 2019-01-01 RX ADMIN — IPRATROPIUM BROMIDE AND ALBUTEROL SULFATE 3 ML: 2.5; .5 SOLUTION RESPIRATORY (INHALATION) at 23:10

## 2019-01-01 RX ADMIN — FERROUS SULFATE TAB 325 MG (65 MG ELEMENTAL FE) 325 MG: 325 (65 FE) TAB at 08:35

## 2019-01-01 RX ADMIN — SERTRALINE 50 MG: 50 TABLET, FILM COATED ORAL at 08:27

## 2019-01-01 RX ADMIN — BUDESONIDE 0.5 MG: 0.5 INHALANT RESPIRATORY (INHALATION) at 07:25

## 2019-01-01 RX ADMIN — FERROUS SULFATE TAB 325 MG (65 MG ELEMENTAL FE) 325 MG: 325 (65 FE) TAB at 08:00

## 2019-01-01 RX ADMIN — POTASSIUM CHLORIDE 20 MEQ: 750 CAPSULE, EXTENDED RELEASE ORAL at 20:53

## 2019-01-01 RX ADMIN — FUROSEMIDE 40 MG: 40 TABLET ORAL at 08:43

## 2019-01-01 RX ADMIN — FENTANYL CITRATE 25 MCG: 50 INJECTION INTRAMUSCULAR; INTRAVENOUS at 06:07

## 2019-01-01 RX ADMIN — BUSPIRONE HYDROCHLORIDE 10 MG: 10 TABLET ORAL at 08:17

## 2019-01-01 RX ADMIN — HYDROCODONE BITARTRATE AND ACETAMINOPHEN 1 TABLET: 7.5; 325 TABLET ORAL at 08:38

## 2019-01-01 RX ADMIN — OXYCODONE HYDROCHLORIDE AND ACETAMINOPHEN 1 TABLET: 7.5; 325 TABLET ORAL at 05:17

## 2019-01-01 RX ADMIN — DICYCLOMINE HYDROCHLORIDE 10 MG: 10 CAPSULE ORAL at 08:59

## 2019-01-01 RX ADMIN — ALBUTEROL SULFATE 6 PUFF: 90 AEROSOL, METERED RESPIRATORY (INHALATION) at 06:18

## 2019-01-01 RX ADMIN — SODIUM CHLORIDE 125 ML/HR: 9 INJECTION, SOLUTION INTRAVENOUS at 05:20

## 2019-01-01 RX ADMIN — PROPOFOL 120 MG: 10 INJECTION, EMULSION INTRAVENOUS at 08:13

## 2019-01-01 RX ADMIN — LOPERAMIDE HYDROCHLORIDE 2 MG: 2 CAPSULE ORAL at 06:26

## 2019-01-01 RX ADMIN — DICYCLOMINE HYDROCHLORIDE 10 MG: 10 CAPSULE ORAL at 08:20

## 2019-01-01 RX ADMIN — BUDESONIDE 0.5 MG: 0.5 INHALANT RESPIRATORY (INHALATION) at 08:54

## 2019-01-01 RX ADMIN — PANTOPRAZOLE SODIUM 40 MG: 40 TABLET, DELAYED RELEASE ORAL at 14:14

## 2019-01-01 RX ADMIN — TIOTROPIUM BROMIDE INHALATION SPRAY 2 PUFF: 3.12 SPRAY, METERED RESPIRATORY (INHALATION) at 08:25

## 2019-01-01 RX ADMIN — ALBUTEROL SULFATE 6 PUFF: 90 AEROSOL, METERED RESPIRATORY (INHALATION) at 15:34

## 2019-01-01 RX ADMIN — ALPRAZOLAM 0.25 MG: 0.25 TABLET ORAL at 12:05

## 2019-01-01 RX ADMIN — SUCRALFATE 1 G: 1 SUSPENSION ORAL at 20:16

## 2019-01-01 RX ADMIN — CARVEDILOL 6.25 MG: 6.25 TABLET, FILM COATED ORAL at 08:06

## 2019-01-01 RX ADMIN — FERROUS SULFATE TAB 325 MG (65 MG ELEMENTAL FE) 325 MG: 325 (65 FE) TAB at 08:56

## 2019-01-01 RX ADMIN — HYDROCODONE BITARTRATE AND ACETAMINOPHEN 1 TABLET: 5; 325 TABLET ORAL at 20:19

## 2019-01-01 RX ADMIN — METHYLPREDNISOLONE SODIUM SUCCINATE 40 MG: 40 INJECTION, POWDER, FOR SOLUTION INTRAMUSCULAR; INTRAVENOUS at 00:39

## 2019-01-01 RX ADMIN — MORPHINE SULFATE 1 MG: 2 INJECTION, SOLUTION INTRAMUSCULAR; INTRAVENOUS at 12:02

## 2019-01-01 RX ADMIN — ALBUTEROL SULFATE 2.5 MG: 2.5 SOLUTION RESPIRATORY (INHALATION) at 15:39

## 2019-01-01 RX ADMIN — IPRATROPIUM BROMIDE AND ALBUTEROL SULFATE 3 ML: 2.5; .5 SOLUTION RESPIRATORY (INHALATION) at 10:53

## 2019-01-01 RX ADMIN — FUROSEMIDE 40 MG: 40 TABLET ORAL at 08:59

## 2019-01-01 RX ADMIN — BUSPIRONE HYDROCHLORIDE 10 MG: 10 TABLET ORAL at 07:38

## 2019-01-01 RX ADMIN — BUSPIRONE HYDROCHLORIDE 10 MG: 10 TABLET ORAL at 20:40

## 2019-01-01 RX ADMIN — ALBUTEROL SULFATE 6 PUFF: 90 AEROSOL, METERED RESPIRATORY (INHALATION) at 23:05

## 2019-01-01 RX ADMIN — IPRATROPIUM BROMIDE AND ALBUTEROL SULFATE 3 ML: 2.5; .5 SOLUTION RESPIRATORY (INHALATION) at 12:09

## 2019-01-01 RX ADMIN — ALBUTEROL SULFATE 6 PUFF: 90 AEROSOL, METERED RESPIRATORY (INHALATION) at 10:39

## 2019-01-01 RX ADMIN — BUDESONIDE 0.5 MG: 0.5 INHALANT RESPIRATORY (INHALATION) at 19:17

## 2019-01-01 RX ADMIN — CEFEPIME HYDROCHLORIDE 2 G: 2 INJECTION, POWDER, FOR SOLUTION INTRAVENOUS at 04:40

## 2019-01-01 RX ADMIN — AZITHROMYCIN MONOHYDRATE 500 MG: 500 INJECTION, POWDER, LYOPHILIZED, FOR SOLUTION INTRAVENOUS at 10:04

## 2019-01-01 RX ADMIN — SUCRALFATE 1 G: 1 SUSPENSION ORAL at 11:07

## 2019-01-01 RX ADMIN — BUDESONIDE 0.5 MG: 0.5 INHALANT RESPIRATORY (INHALATION) at 20:42

## 2019-01-01 RX ADMIN — ARFORMOTEROL TARTRATE 15 MCG: 15 SOLUTION RESPIRATORY (INHALATION) at 19:17

## 2019-01-01 RX ADMIN — IPRATROPIUM BROMIDE AND ALBUTEROL SULFATE 3 ML: 2.5; .5 SOLUTION RESPIRATORY (INHALATION) at 11:36

## 2019-01-01 RX ADMIN — POTASSIUM CHLORIDE 10 MEQ: 7.46 INJECTION, SOLUTION INTRAVENOUS at 23:28

## 2019-01-01 RX ADMIN — CARVEDILOL 6.25 MG: 6.25 TABLET, FILM COATED ORAL at 08:19

## 2019-01-01 RX ADMIN — GLYCOPYRROLATE 0.2 MG: 0.2 INJECTION INTRAMUSCULAR; INTRAVENOUS at 08:21

## 2019-01-01 RX ADMIN — CARVEDILOL 6.25 MG: 6.25 TABLET, FILM COATED ORAL at 20:17

## 2019-01-01 RX ADMIN — CARVEDILOL 6.25 MG: 6.25 TABLET, FILM COATED ORAL at 20:13

## 2019-01-01 RX ADMIN — FERROUS SULFATE TAB 325 MG (65 MG ELEMENTAL FE) 325 MG: 325 (65 FE) TAB at 08:07

## 2019-01-01 RX ADMIN — FENTANYL CITRATE 25 MCG: 50 INJECTION, SOLUTION INTRAMUSCULAR; INTRAVENOUS at 21:53

## 2019-01-01 RX ADMIN — BUDESONIDE 0.5 MG: 0.5 INHALANT RESPIRATORY (INHALATION) at 21:45

## 2019-01-01 RX ADMIN — POTASSIUM CHLORIDE 40 MEQ: 750 CAPSULE, EXTENDED RELEASE ORAL at 10:36

## 2019-01-01 RX ADMIN — DICYCLOMINE HYDROCHLORIDE 10 MG: 10 CAPSULE ORAL at 16:47

## 2019-01-01 RX ADMIN — DEXMEDETOMIDINE HYDROCHLORIDE 1.2 MCG/KG/HR: 100 INJECTION, SOLUTION, CONCENTRATE INTRAVENOUS at 14:01

## 2019-01-01 RX ADMIN — ALBUTEROL SULFATE 6 PUFF: 90 AEROSOL, METERED RESPIRATORY (INHALATION) at 14:21

## 2019-01-01 RX ADMIN — BUSPIRONE HYDROCHLORIDE 10 MG: 10 TABLET ORAL at 08:09

## 2019-01-01 RX ADMIN — HYDROCODONE BITARTRATE AND ACETAMINOPHEN 2 TABLET: 7.5; 325 TABLET ORAL at 01:47

## 2019-01-01 RX ADMIN — SODIUM CHLORIDE 125 ML/HR: 9 INJECTION, SOLUTION INTRAVENOUS at 13:49

## 2019-01-01 RX ADMIN — EPHEDRINE SULFATE 10 MG: 50 INJECTION INTRAMUSCULAR; INTRAVENOUS; SUBCUTANEOUS at 08:23

## 2019-01-01 RX ADMIN — HYDROCODONE BITARTRATE AND ACETAMINOPHEN 2 TABLET: 7.5; 325 TABLET ORAL at 20:45

## 2019-01-01 RX ADMIN — SUCRALFATE 1 G: 1 SUSPENSION ORAL at 20:38

## 2019-01-01 RX ADMIN — BUDESONIDE 0.5 MG: 0.5 INHALANT RESPIRATORY (INHALATION) at 19:39

## 2019-01-01 RX ADMIN — OXYCODONE HYDROCHLORIDE AND ACETAMINOPHEN 1 TABLET: 7.5; 325 TABLET ORAL at 06:02

## 2019-01-01 RX ADMIN — ALPRAZOLAM 0.5 MG: 0.5 TABLET ORAL at 18:25

## 2019-01-01 RX ADMIN — METHYLPREDNISOLONE SODIUM SUCCINATE 40 MG: 125 INJECTION, POWDER, FOR SOLUTION INTRAMUSCULAR; INTRAVENOUS at 02:17

## 2019-01-01 RX ADMIN — DICYCLOMINE HYDROCHLORIDE 10 MG: 10 CAPSULE ORAL at 21:28

## 2019-01-01 RX ADMIN — LORAZEPAM 1 MG: 2 INJECTION INTRAMUSCULAR; INTRAVENOUS at 10:35

## 2019-01-01 RX ADMIN — FENTANYL CITRATE 25 MCG: 50 INJECTION INTRAMUSCULAR; INTRAVENOUS at 21:57

## 2019-01-01 RX ADMIN — BUDESONIDE 0.5 MG: 0.5 INHALANT RESPIRATORY (INHALATION) at 08:21

## 2019-01-01 RX ADMIN — BUSPIRONE HYDROCHLORIDE 10 MG: 10 TABLET ORAL at 20:52

## 2019-01-01 RX ADMIN — PANTOPRAZOLE SODIUM 40 MG: 40 TABLET, DELAYED RELEASE ORAL at 08:22

## 2019-01-01 RX ADMIN — HYDROCODONE BITARTRATE AND ACETAMINOPHEN 1 TABLET: 7.5; 325 TABLET ORAL at 06:47

## 2019-01-01 RX ADMIN — ALBUTEROL SULFATE 6 PUFF: 90 AEROSOL, METERED RESPIRATORY (INHALATION) at 19:09

## 2019-01-01 RX ADMIN — TIOTROPIUM BROMIDE INHALATION SPRAY 2 PUFF: 3.12 SPRAY, METERED RESPIRATORY (INHALATION) at 08:51

## 2019-01-01 RX ADMIN — ROFLUMILAST 500 MCG: 500 TABLET ORAL at 08:17

## 2019-01-01 RX ADMIN — LORAZEPAM 1 MG: 2 INJECTION INTRAMUSCULAR; INTRAVENOUS at 18:32

## 2019-01-01 RX ADMIN — POTASSIUM CHLORIDE 20 MEQ: 750 CAPSULE, EXTENDED RELEASE ORAL at 21:28

## 2019-01-01 RX ADMIN — ASPIRIN 325 MG: 325 TABLET, DELAYED RELEASE ORAL at 14:13

## 2019-01-01 RX ADMIN — PHENYLEPHRINE HYDROCHLORIDE 50 MCG: 10 INJECTION INTRAVENOUS at 08:26

## 2019-01-01 RX ADMIN — HYDROMORPHONE HYDROCHLORIDE 0.5 MG: 1 INJECTION, SOLUTION INTRAMUSCULAR; INTRAVENOUS; SUBCUTANEOUS at 11:04

## 2019-01-01 RX ADMIN — ENOXAPARIN SODIUM 30 MG: 30 INJECTION SUBCUTANEOUS at 20:35

## 2019-01-01 RX ADMIN — CARVEDILOL 6.25 MG: 6.25 TABLET, FILM COATED ORAL at 20:04

## 2019-01-01 RX ADMIN — SUCRALFATE 1 G: 1 SUSPENSION ORAL at 21:24

## 2019-01-01 RX ADMIN — ROFLUMILAST 500 MCG: 500 TABLET ORAL at 08:08

## 2019-01-01 RX ADMIN — PREDNISONE 20 MG: 20 TABLET ORAL at 08:22

## 2019-01-01 RX ADMIN — METHYLPREDNISOLONE SODIUM SUCCINATE 40 MG: 125 INJECTION, POWDER, FOR SOLUTION INTRAMUSCULAR; INTRAVENOUS at 02:22

## 2019-01-01 RX ADMIN — SUCRALFATE 1 G: 1 SUSPENSION ORAL at 17:49

## 2019-01-01 RX ADMIN — PANTOPRAZOLE SODIUM 40 MG: 40 TABLET, DELAYED RELEASE ORAL at 08:09

## 2019-01-01 RX ADMIN — SERTRALINE 50 MG: 50 TABLET, FILM COATED ORAL at 08:20

## 2019-01-01 RX ADMIN — HYDROCODONE BITARTRATE AND ACETAMINOPHEN 2 TABLET: 7.5; 325 TABLET ORAL at 13:25

## 2019-01-01 RX ADMIN — PREDNISONE 10 MG: 10 TABLET ORAL at 08:03

## 2019-01-01 RX ADMIN — SERTRALINE 50 MG: 50 TABLET, FILM COATED ORAL at 08:09

## 2019-01-01 RX ADMIN — ALBUTEROL SULFATE 6 PUFF: 90 AEROSOL, METERED RESPIRATORY (INHALATION) at 20:21

## 2019-01-01 RX ADMIN — DICYCLOMINE HYDROCHLORIDE 10 MG: 10 CAPSULE ORAL at 09:35

## 2019-01-01 RX ADMIN — ROFLUMILAST 500 MCG: 500 TABLET ORAL at 08:05

## 2019-01-01 RX ADMIN — VANCOMYCIN HYDROCHLORIDE 1000 MG: 1 INJECTION, SOLUTION INTRAVENOUS at 23:00

## 2019-01-01 RX ADMIN — CARVEDILOL 6.25 MG: 3.12 TABLET, FILM COATED ORAL at 20:15

## 2019-01-01 RX ADMIN — SUCRALFATE 1 G: 1 SUSPENSION ORAL at 08:22

## 2019-01-01 RX ADMIN — BUDESONIDE 0.5 MG: 0.5 INHALANT RESPIRATORY (INHALATION) at 07:36

## 2019-01-01 RX ADMIN — MIDAZOLAM 1 MG: 1 INJECTION INTRAMUSCULAR; INTRAVENOUS at 07:56

## 2019-01-01 RX ADMIN — PANTOPRAZOLE SODIUM 40 MG: 40 TABLET, DELAYED RELEASE ORAL at 08:08

## 2019-01-01 RX ADMIN — POTASSIUM CHLORIDE 20 MEQ: 750 CAPSULE, EXTENDED RELEASE ORAL at 08:04

## 2019-01-01 RX ADMIN — CEFEPIME HYDROCHLORIDE 2 G: 2 INJECTION, POWDER, FOR SOLUTION INTRAVENOUS at 05:15

## 2019-01-01 RX ADMIN — PREDNISONE 20 MG: 20 TABLET ORAL at 08:59

## 2019-01-01 RX ADMIN — DICYCLOMINE HYDROCHLORIDE 10 MG: 10 CAPSULE ORAL at 15:29

## 2019-01-01 RX ADMIN — CARVEDILOL 6.25 MG: 3.12 TABLET, FILM COATED ORAL at 08:26

## 2019-01-01 RX ADMIN — GUAIFENESIN 1200 MG: 600 TABLET, EXTENDED RELEASE ORAL at 20:52

## 2019-01-01 RX ADMIN — KETOROLAC TROMETHAMINE 15 MG: 30 INJECTION, SOLUTION INTRAMUSCULAR at 02:40

## 2019-01-01 RX ADMIN — PREDNISONE 20 MG: 20 TABLET ORAL at 08:09

## 2019-01-01 RX ADMIN — LOSARTAN POTASSIUM 25 MG: 25 TABLET, FILM COATED ORAL at 08:43

## 2019-01-01 RX ADMIN — DICYCLOMINE HYDROCHLORIDE 10 MG: 10 CAPSULE ORAL at 20:38

## 2019-01-01 RX ADMIN — PANTOPRAZOLE SODIUM 40 MG: 40 TABLET, DELAYED RELEASE ORAL at 06:18

## 2019-01-01 RX ADMIN — BUSPIRONE HYDROCHLORIDE 10 MG: 10 TABLET ORAL at 08:15

## 2019-01-01 RX ADMIN — CARVEDILOL 6.25 MG: 3.12 TABLET, FILM COATED ORAL at 21:24

## 2019-01-01 RX ADMIN — FUROSEMIDE 40 MG: 40 TABLET ORAL at 08:01

## 2019-01-01 RX ADMIN — TRANEXAMIC ACID 400 MG: 100 INJECTION, SOLUTION INTRAVENOUS at 08:46

## 2019-01-01 RX ADMIN — DICYCLOMINE HYDROCHLORIDE 10 MG: 10 CAPSULE ORAL at 11:22

## 2019-01-01 RX ADMIN — IPRATROPIUM BROMIDE AND ALBUTEROL SULFATE 3 ML: 2.5; .5 SOLUTION RESPIRATORY (INHALATION) at 07:36

## 2019-01-01 RX ADMIN — ONDANSETRON 4 MG: 2 INJECTION INTRAMUSCULAR; INTRAVENOUS at 08:22

## 2019-01-01 RX ADMIN — CEFEPIME HYDROCHLORIDE 2 G: 2 INJECTION, POWDER, FOR SOLUTION INTRAVENOUS at 18:17

## 2019-01-01 RX ADMIN — PANTOPRAZOLE SODIUM 40 MG: 40 TABLET, DELAYED RELEASE ORAL at 08:27

## 2019-01-01 RX ADMIN — ARFORMOTEROL TARTRATE 15 MCG: 15 SOLUTION RESPIRATORY (INHALATION) at 07:07

## 2019-01-01 RX ADMIN — SUCRALFATE 0.5 G: 1 SUSPENSION ORAL at 16:51

## 2019-01-01 RX ADMIN — ALPRAZOLAM 0.25 MG: 0.25 TABLET ORAL at 21:31

## 2019-01-01 RX ADMIN — PREDNISONE 20 MG: 20 TABLET ORAL at 18:03

## 2019-01-01 RX ADMIN — HYDROCODONE BITARTRATE AND ACETAMINOPHEN 2 TABLET: 7.5; 325 TABLET ORAL at 20:39

## 2019-01-01 RX ADMIN — ENOXAPARIN SODIUM 40 MG: 40 INJECTION SUBCUTANEOUS at 20:58

## 2019-01-01 RX ADMIN — METHYLPREDNISOLONE SODIUM SUCCINATE 40 MG: 40 INJECTION, POWDER, FOR SOLUTION INTRAMUSCULAR; INTRAVENOUS at 08:08

## 2019-01-01 RX ADMIN — HYDROCODONE BITARTRATE AND ACETAMINOPHEN 1 TABLET: 5; 325 TABLET ORAL at 14:54

## 2019-01-01 RX ADMIN — LOSARTAN POTASSIUM 25 MG: 25 TABLET, FILM COATED ORAL at 08:56

## 2019-01-01 RX ADMIN — ROFLUMILAST 500 MCG: 500 TABLET ORAL at 08:27

## 2019-01-01 RX ADMIN — SUCRALFATE 0.5 G: 1 SUSPENSION ORAL at 16:55

## 2019-01-01 RX ADMIN — HYDROCODONE BITARTRATE AND ACETAMINOPHEN 1 TABLET: 5; 325 TABLET ORAL at 00:05

## 2019-01-01 RX ADMIN — DICYCLOMINE HYDROCHLORIDE 10 MG: 10 CAPSULE ORAL at 12:16

## 2019-01-01 RX ADMIN — HYDROCODONE BITARTRATE AND ACETAMINOPHEN 2 TABLET: 7.5; 325 TABLET ORAL at 10:03

## 2019-01-01 RX ADMIN — SODIUM CHLORIDE, PRESERVATIVE FREE 3 ML: 5 INJECTION INTRAVENOUS at 20:39

## 2019-01-01 RX ADMIN — IPRATROPIUM BROMIDE AND ALBUTEROL SULFATE 3 ML: 2.5; .5 SOLUTION RESPIRATORY (INHALATION) at 21:49

## 2019-01-01 RX ADMIN — BUSPIRONE HYDROCHLORIDE 10 MG: 10 TABLET ORAL at 10:02

## 2019-01-01 RX ADMIN — DEXMEDETOMIDINE HYDROCHLORIDE 0.5 MCG/KG/HR: 100 INJECTION, SOLUTION, CONCENTRATE INTRAVENOUS at 21:28

## 2019-01-01 RX ADMIN — HYDRALAZINE HYDROCHLORIDE 20 MG: 20 INJECTION INTRAMUSCULAR; INTRAVENOUS at 18:18

## 2019-01-01 RX ADMIN — BUSPIRONE HYDROCHLORIDE 10 MG: 10 TABLET ORAL at 20:15

## 2019-01-01 RX ADMIN — ALBUTEROL SULFATE 6 PUFF: 90 AEROSOL, METERED RESPIRATORY (INHALATION) at 03:28

## 2019-01-01 RX ADMIN — HYDROMORPHONE HYDROCHLORIDE 0.25 MG: 2 INJECTION INTRAMUSCULAR; INTRAVENOUS; SUBCUTANEOUS at 09:17

## 2019-01-01 RX ADMIN — METHYLPREDNISOLONE SODIUM SUCCINATE 40 MG: 40 INJECTION, POWDER, FOR SOLUTION INTRAMUSCULAR; INTRAVENOUS at 08:51

## 2019-01-01 RX ADMIN — MORPHINE SULFATE 4 MG: 2 INJECTION, SOLUTION INTRAMUSCULAR; INTRAVENOUS at 09:07

## 2019-01-01 RX ADMIN — PROPOFOL 40 MCG/KG/MIN: 10 INJECTION, EMULSION INTRAVENOUS at 23:00

## 2019-01-01 RX ADMIN — PROPOFOL 30 MCG/KG/MIN: 10 INJECTION, EMULSION INTRAVENOUS at 23:19

## 2019-01-01 RX ADMIN — BUDESONIDE 0.5 MG: 0.5 INHALANT RESPIRATORY (INHALATION) at 20:31

## 2019-01-01 RX ADMIN — ALBUTEROL SULFATE 6 PUFF: 90 AEROSOL, METERED RESPIRATORY (INHALATION) at 06:28

## 2019-01-01 RX ADMIN — CARVEDILOL 6.25 MG: 6.25 TABLET, FILM COATED ORAL at 08:07

## 2019-01-01 RX ADMIN — HYDROCODONE BITARTRATE AND ACETAMINOPHEN 2 TABLET: 7.5; 325 TABLET ORAL at 14:20

## 2019-01-01 RX ADMIN — IPRATROPIUM BROMIDE AND ALBUTEROL SULFATE 3 ML: 2.5; .5 SOLUTION RESPIRATORY (INHALATION) at 03:38

## 2019-01-01 RX ADMIN — OXYCODONE HYDROCHLORIDE AND ACETAMINOPHEN 1 TABLET: 7.5; 325 TABLET ORAL at 20:04

## 2019-01-01 RX ADMIN — SERTRALINE 50 MG: 50 TABLET, FILM COATED ORAL at 08:56

## 2019-01-01 RX ADMIN — BUSPIRONE HYDROCHLORIDE 10 MG: 10 TABLET ORAL at 08:43

## 2019-01-01 RX ADMIN — PANTOPRAZOLE SODIUM 40 MG: 40 TABLET, DELAYED RELEASE ORAL at 05:35

## 2019-01-01 RX ADMIN — BUSPIRONE HYDROCHLORIDE 10 MG: 10 TABLET ORAL at 08:04

## 2019-01-01 RX ADMIN — SENNOSIDES,DOCUSATE SODIUM 2 TABLET: 50; 8.6 TABLET, FILM COATED ORAL at 20:58

## 2019-01-01 RX ADMIN — FENTANYL CITRATE 25 MCG: 50 INJECTION INTRAMUSCULAR; INTRAVENOUS at 22:47

## 2019-01-01 RX ADMIN — OXYCODONE HYDROCHLORIDE AND ACETAMINOPHEN 1 TABLET: 7.5; 325 TABLET ORAL at 09:17

## 2019-01-01 RX ADMIN — ALBUTEROL SULFATE 2.5 MG: 2.5 SOLUTION RESPIRATORY (INHALATION) at 07:53

## 2019-01-01 RX ADMIN — BUSPIRONE HYDROCHLORIDE 10 MG: 10 TABLET ORAL at 20:17

## 2019-01-01 RX ADMIN — SUCRALFATE 1 G: 1 SUSPENSION ORAL at 07:38

## 2019-01-01 RX ADMIN — BUDESONIDE 0.5 MG: 0.5 INHALANT RESPIRATORY (INHALATION) at 18:50

## 2019-01-01 RX ADMIN — INSULIN LISPRO 4 UNITS: 100 INJECTION, SOLUTION INTRAVENOUS; SUBCUTANEOUS at 12:23

## 2019-01-01 RX ADMIN — ALPRAZOLAM 0.25 MG: 0.25 TABLET ORAL at 23:40

## 2019-01-01 RX ADMIN — BUSPIRONE HYDROCHLORIDE 10 MG: 10 TABLET ORAL at 08:22

## 2019-01-01 RX ADMIN — BUSPIRONE HYDROCHLORIDE 10 MG: 10 TABLET ORAL at 20:58

## 2019-01-01 RX ADMIN — AZITHROMYCIN MONOHYDRATE 500 MG: 500 INJECTION, POWDER, LYOPHILIZED, FOR SOLUTION INTRAVENOUS at 10:29

## 2019-01-01 RX ADMIN — SODIUM CHLORIDE, PRESERVATIVE FREE 3 ML: 5 INJECTION INTRAVENOUS at 08:50

## 2019-01-01 RX ADMIN — GUAIFENESIN 1200 MG: 600 TABLET, EXTENDED RELEASE ORAL at 08:03

## 2019-01-01 RX ADMIN — GUAIFENESIN 1200 MG: 600 TABLET, EXTENDED RELEASE ORAL at 08:59

## 2019-01-01 RX ADMIN — LOPERAMIDE HYDROCHLORIDE 2 MG: 2 CAPSULE ORAL at 10:01

## 2019-01-01 RX ADMIN — DICYCLOMINE HYDROCHLORIDE 10 MG: 10 CAPSULE ORAL at 08:35

## 2019-01-01 RX ADMIN — BUDESONIDE 0.5 MG: 0.5 INHALANT RESPIRATORY (INHALATION) at 21:24

## 2019-01-01 RX ADMIN — SODIUM CHLORIDE, PRESERVATIVE FREE 3 ML: 5 INJECTION INTRAVENOUS at 20:17

## 2019-01-01 RX ADMIN — ALBUTEROL SULFATE 2.5 MG: 2.5 SOLUTION RESPIRATORY (INHALATION) at 11:15

## 2019-01-01 RX ADMIN — DEXMEDETOMIDINE HYDROCHLORIDE 0.8 MCG/KG/HR: 100 INJECTION, SOLUTION INTRAVENOUS at 10:00

## 2019-01-01 RX ADMIN — CARVEDILOL 6.25 MG: 3.12 TABLET, FILM COATED ORAL at 08:09

## 2019-01-01 RX ADMIN — SODIUM CHLORIDE 125 ML/HR: 9 INJECTION, SOLUTION INTRAVENOUS at 13:45

## 2019-01-01 RX ADMIN — CARVEDILOL 6.25 MG: 6.25 TABLET, FILM COATED ORAL at 08:17

## 2019-01-01 RX ADMIN — CEFAZOLIN SODIUM 2 G: 2 INJECTION, SOLUTION INTRAVENOUS at 09:02

## 2019-01-01 RX ADMIN — SODIUM CHLORIDE 4 ML: 7 NEBU SOLN,3 % NEBU at 15:04

## 2019-01-01 RX ADMIN — BUSPIRONE HYDROCHLORIDE 10 MG: 10 TABLET ORAL at 08:59

## 2019-01-01 RX ADMIN — LIDOCAINE HYDROCHLORIDE 100 MG: 20 INJECTION, SOLUTION INFILTRATION; PERINEURAL at 16:04

## 2019-01-01 RX ADMIN — HYDROCODONE BITARTRATE AND ACETAMINOPHEN 1 TABLET: 5; 325 TABLET ORAL at 05:29

## 2019-01-01 RX ADMIN — HYDROCODONE BITARTRATE AND ACETAMINOPHEN 1 TABLET: 7.5; 325 TABLET ORAL at 14:27

## 2019-01-01 RX ADMIN — ALBUTEROL SULFATE 2.5 MG: 2.5 SOLUTION RESPIRATORY (INHALATION) at 21:00

## 2019-01-01 RX ADMIN — DICYCLOMINE HYDROCHLORIDE 10 MG: 10 CAPSULE ORAL at 16:28

## 2019-01-01 RX ADMIN — SODIUM CHLORIDE, POTASSIUM CHLORIDE, SODIUM LACTATE AND CALCIUM CHLORIDE 9 ML/HR: 600; 310; 30; 20 INJECTION, SOLUTION INTRAVENOUS at 11:19

## 2019-01-01 RX ADMIN — GUAIFENESIN 1200 MG: 600 TABLET, EXTENDED RELEASE ORAL at 20:37

## 2019-01-01 RX ADMIN — SODIUM CHLORIDE, POTASSIUM CHLORIDE, SODIUM LACTATE AND CALCIUM CHLORIDE 9 ML/HR: 600; 310; 30; 20 INJECTION, SOLUTION INTRAVENOUS at 15:28

## 2019-01-01 RX ADMIN — ARFORMOTEROL TARTRATE 15 MCG: 15 SOLUTION RESPIRATORY (INHALATION) at 11:17

## 2019-01-01 RX ADMIN — SCOPALAMINE 1 PATCH: 1 PATCH, EXTENDED RELEASE TRANSDERMAL at 12:30

## 2019-01-01 RX ADMIN — ENOXAPARIN SODIUM 50 MG: 60 INJECTION SUBCUTANEOUS at 20:04

## 2019-01-01 ASSESSMENT — HOOS JR
HOOS JR SCORE: 36.363
HOOS JR SCORE: 17

## 2019-04-10 NOTE — DISCHARGE INSTRUCTIONS
Take the following medications the morning of surgery with a small sip of water: TRELEGY, PULMICORT MN, CARVEDILOL, LOSARTAN, PANTOPRAZOLE, DALIREST        General Instructions:  • Do not eat solid food after midnight the night before surgery.  • You may drink clear liquids day of surgery but must stop at least one hour before your hospital arrival time.  • It is beneficial for you to have a clear drink that contains carbohydrates the day of surgery.  We suggest a 12 to 20 ounce bottle of Gatorade or Powerade for non-diabetic patients or a 12 to 20 ounce bottle of G2 or Powerade Zero for diabetic patients.     Clear liquids are liquids you can see through.  Nothing red in color.     Plain water                               Sports drinks  Sodas                                   Gelatin (Jell-O)  Fruit juices without pulp such as white grape juice and apple juice  Popsicles that contain no fruit or yogurt  Tea or coffee (no cream or milk added)  Gatorade / Powerade  G2 / Powerade Zero    • Patients who avoid smoking, chewing tobacco and alcohol for 4 weeks prior to surgery have a reduced risk of post-operative complications.  Quit smoking as many days before surgery as you can.  • Do not smoke, use chewing tobacco or drink alcohol the day of surgery.   • If applicable bring your C-PAP/ BI-PAP machine.  • Bring any papers given to you in the doctor’s office.  • Wear clean comfortable clothes and socks.  • Do not wear contact lenses, false eyelashes or make-up.  Bring a case for your glasses.   • Remove all piercings.  Leave jewelry and any other valuables at home.  • The Pre-Admission Testing nurse will instruct you to bring medications if unable to obtain an accurate list in Pre-Admission Testing.            Preventing a Surgical Site Infection:  • For 2 to 3 days before surgery, avoid shaving with a razor because the razor can irritate skin and make it easier to develop an infection.    • Any areas of open skin can  increase the risk of a post-operative wound infection by allowing bacteria to enter and travel throughout the body.  Notify your surgeon if you have any skin wounds / rashes even if it is not near the expected surgical site.  The area will need assessed to determine if surgery should be delayed until it is healed.  • The night prior to surgery sleep in a clean bed with clean clothing.  Do not allow pets to sleep with you.  • Shower on the morning of surgery using a fresh bar of anti-bacterial soap (such as Dial) and clean washcloth.  Dry with a clean towel and dress in clean clothing.  • Ask your surgeon if you will be receiving antibiotics prior to surgery.  • Make sure you, your family, and all healthcare providers clean their hands with soap and water or an alcohol based hand  before caring for you or your wound.    Day of surgery: 4/15/2019. MAIN OR. ARRIVAL TIME 6 AM  Upon arrival, a Pre-op nurse and Anesthesiologist will review your health history, obtain vital signs, and answer questions you may have.  The only belongings needed at this time will be your home medications and if applicable your C-PAP/BI-PAP machine.  If you are staying overnight your family can leave the rest of your belongings in the car and bring them to your room later.  A Pre-op nurse will start an IV and you may receive medication in preparation for surgery, including something to help you relax.  Your family will be able to see you in the Pre-op area.  While you are in surgery your family should notify the waiting room  if they leave the waiting room area and provide a contact phone number.    Please be aware that surgery does come with discomfort.  We want to make every effort to control your discomfort so please discuss any uncontrolled symptoms with your nurse.   Your doctor will most likely have prescribed pain medications.          If you are staying overnight following surgery, you will be transported to your  hospital room following the recovery period.  [facility] has all private rooms.    You have received a list of surgical assistants for your reference.  If you have any questions please call Pre-Admission Testing at [phone].  Deductibles and co-payments are collected on the day of service. Please be prepared to pay the required co-pay, deductible or deposit on the day of service as defined by your plan.          2% CHLORAHEXIDINE GLUCONATE* CLOTH  Preparing or “prepping” skin before surgery can reduce the risk of infection at the surgical site. To make the process easier, [facility] has chosen disposable cloths moistened with a rinse-free, 2% Chlorhexidine Gluconate (CHG) antiseptic solution. The steps below outline the prepping process and should be carefully followed.        Use the prep cloth on the area that is circled in the diagram             Directions Night before Surgery  1) Shower using a fresh bar of anti-bacterial soap (such as Dial) and clean washcloth.  Use a clean towel to completely dry your skin.  2) Do not use any lotions, oils or creams on your skin.  3) Open the package and remove 1 cloth, wipe your skin for 30 seconds in a circular motion.  Allow to dry for 3 minutes.  4) Repeat #3 with second cloth.  5) Do not touch your eyes, ears, or mouth with the prep cloth.  6) Allow the wet prep solution to air dry.  7) Discard the prep cloth and wash your hands with soap and water.   8) Dress in clean bed clothes and sleep on fresh clean bed sheets.   9) You may experience some temporary itching after the prep.    Directions Day of Surgery  1) Repeat steps 1,2,3,4,5,6,7, and 9.   2) Dress in clean clothes before coming to the hospital.    BACTROBAN NASAL OINTMENT  There are many germs normally in your nose. Bactroban is an ointment that will help reduce these germs. Please follow these instructions for Bactroban use:      _1___The day before surgery in the  morning  Date__4/14______    _2___The day before surgery in the evening              Date__4/14______    _3___The day of surgery in the morning    Date__4/15______    **Squirt ½ package of Bactroban Ointment onto a cotton applicator and apply to inside of 1st nostril.  Squirt the remaining Bactroban and apply to the inside of the other nostril.

## 2019-04-15 PROBLEM — M16.9 DEGENERATIVE JOINT DISEASE (DJD) OF HIP: Status: ACTIVE | Noted: 2019-01-01

## 2019-04-15 NOTE — OP NOTE
Orthopaedic Surgery   Left Total Hip  Dr. Josh Ramírez Sr  [phone]    Patient Name:  Maryjo Wynn  YOB: 1945  Medical Records Number:  [id number]    Date of Procedure:  4/15/2019    Pre-operative Diagnosis:  DJD Left Hip    Post-operative Diagnosis:  DJD Left Hip    Procedure Performed:  Left Total Hip Arthroplasty                                          Layered Closure    Implants:     Implant Name Type Inv. Item Serial No.  Lot No. LRB No. Used Action   LINER ACET R3 XLPE 20D [id number] - [id number] Implant LINER ACET R3 XLPE 20D [id number]  [employer] [id number] Left 1 Implanted   SHLL ACET R3 3H STD 48MM - [id number] Implant SHLL ACET R3 3H STD 48MM  [employer] [id number] Left 1 Implanted   SCRW SPH HD REFLECTION 6.5X40MM - [id number] Implant SCRW SPH HD REFLECTION 6.5X40MM  [employer] [id number] Left 1 Implanted   SCRW SPH HD REFLECTION 6.5X30MM - [id number] Implant SCRW SPH HD REFLECTION 6.5X30MM  [employer] [id number] Left 1 Implanted   STEM FEM/HIP POLARSTEM CMTLESS LAT CCD 126D SZ2 - [id number] Implant STEM FEM/HIP POLARSTEM CMTLESS LAT CCD 126D SZ2  [employer] [id number] Left 1 Implanted   HD FEM OXINIUM TPR 12 14 32MM PLS0 - [id number] Implant HD FEM OXINIUM TPR 12 14 32MM PLS0  [employer] [id number] Left 1 Implanted       Implant system utilized: Littlejohn and nephew lateralized offset # 2 polar stem femur.  R3 48 mm cup.  32 mm inner diameter polyethylene insert.  +0 neck length.  36 mm Oxinium nonmodular head.    Surgeon:  Josh Ramírez MD    Assistant:  I utilized service of an assistant surgeon, specifically Rosa Fisher PA-C. Assistant surgeon participated in crucial portion of the operation. Use of the assistant surgeon greatly reduced overall operative time, thus significantly reducing overall morbidity for the patient.     Anesthetic Type:  General    Estimated Blood Loss:   200ml    Specimens: * No orders in the log  *    Complications:  None    Tranexamic acid: Was given IV at the beginning of the case       Quality Measures:I have documented the patient's current medications including dosage, frequency, and route of administration. We discussed conservative alternatives during the decision-making process prior to the procedure. Patient was evaluated immediately preoperatively for cardiovascular and thromboembolic risk factors.  There were no acute risk factors. Prophylactic IV antibiotics were administered before the beginning of the case.       Procedure Performed:  Patient was turned with the  affected hip up in the lateral position. After a standard prep and drape posterolateral skin incision was carried out. The gluteus odalis fibers were split in the line of their orientation. The hip was internally rotated. Sciatic nerve was protected. The superior portion of the external rotators along with the posterior, inferior and superior capsule was divided. The hip was dislocated posteriorly. The femoral neck was osteotomized one centimeter above the lesser trochanter. Retractors were placed around the acetabulum. Capsulectomy was performed as required for exposure. I progressively reamed the acetabulum to 1 mm less than the diameter of the acetabular implant size ultimately chosen. After a trial reduction the 48 mm acetabular shell was impacted into 25° forward flexion 45° of abduction. Excellent inherent stability was achieved. Dome supplemental fixation screws weret required. The  high wall polyethylene acetabular insert was dialed posteriorly and impacted into position and found to be secure.    Note in dealing with the bone stock, both acetabular and femoral side we had to manage her extremely osteoporotic bone.  This made it challenging to get adequate fixation and fit and fill.     Attention was turned to the femur. Lateral femoral cortical neck was removed. The canal was axially sounded and then rasped progressively to  create a cavity for the above indicated femoral implant. After a trial reduction the femoral implant was impacted into 10° of anteversion. Final trial reduction revealed the leg lengths to be equalized and the hip to be very stable. The head neck was impacted onto the dried femoral neck. Final trial reduction confirmed the hip was stable.     The wound was closed in layers with #1 Vicryl in the capsule and external rotators, #1 Vicryl in the fascia, 2-0 Vicryl in subcutaneous tissues and zipline in the skin all over an 1/8th inch drain. Note that I injected the capsule, external rotators, and the fascia with an Exparel solution containing 20 mL Exparel, 25 mL of 1/4% bupivacaine, and 20 mL saline. Care was taken to protect anterior neurovascular structures and sciatic nerve. Routine dressings applied.      oJsh Ramírez MD  4/15/2019  3:36 PM

## 2019-04-15 NOTE — THERAPY EVALUATION
Acute Care - Physical Therapy Initial Evaluation  [facility]     Patient Name: Maryjo Wynn  : 1945  MRN: [id number]  Today's Date: 4/15/2019   Onset of Illness/Injury or Date of Surgery: 04/15/19  Date of Referral to PT: 04/15/19  Referring Physician: Josh Haywood      Admit Date: 4/15/2019    Visit Dx:     ICD-10-CM ICD-9-CM   1. Difficulty walking R26.2 719.7     Patient Active Problem List   Diagnosis   • Degenerative joint disease (DJD) of hip     Past Medical History:   Diagnosis Date   • Anxiety and depression    • Arthritis    • Asthma    • COPD (chronic obstructive pulmonary disease) (CMS/[facility])    • Coronary artery disease    • History of anemia    • History of GI bleed    • History of MI (myocardial infarction)    • History of transfusion    • Hyperlipidemia    • Hypertension    • Left hip pain    • On home oxygen therapy    • PVD (peripheral vascular disease) (CMS/[facility])      Past Surgical History:   Procedure Laterality Date   • APPENDECTOMY     • CORONARY ARTERY BYPASS GRAFT  2017    4 VESSEL. ETOCH   • ELBOW OPEN REDUCTION INTERNAL FIXATION Left     WITH HARDWARE   • ENDOSCOPY      WITH CAUTERY OF BLEEDER   • FEMORAL ARTERY STENT      HISTORY OF GARY LEGS   • FEMORAL FEMORAL BYPASS     • HIP SURGERY Right     SPLIT FEMUR SURGERY   • LUMBAR FUSION     • ORIF WRIST FRACTURE Left     WITH HARDWARE   • ORIF WRIST FRACTURE Right    • TOTAL HIP ARTHROPLASTY Right    • VASCULAR SURGERY      MULTIPLE STENTS PLAACED        PT ASSESSMENT (last 12 hours)      Physical Therapy Evaluation     Row Name 04/15/19 1343          PT Evaluation Time/Intention    Subjective Information  complains of;fatigue;pain  -MS     Document Type  evaluation  -MS     Mode of Treatment  physical therapy;individual therapy  -MS     Patient Effort  good  -MS     Comment  Pt. very groggy this PM.  -MS     Row Name 04/15/19 1341          General Information    Onset of Illness/Injury or Date of Surgery  04/15/19  -MS      Referring Physician  Josh Haywood  -MS     Patient Observations  agree to therapy;cooperative  -MS     Prior Level of Function  independent:  -MS     Equipment Currently Used at Home  walker, rolling  -MS     Pertinent History of Current Functional Problem  Pt. is s/p Left THR (Posterior)  -MS     Existing Precautions/Restrictions  fall;hip, posterior  (Significant)  LLE  -MS     Equipment Ordered for Patient  -- Pt. already owns a Rwx for home use.  -MS     Risks Reviewed  patient and family:  -MS     Benefits Reviewed  patient and family:  -MS     Barriers to Rehab  none identified  -MS     Row Name 04/15/19 1343          Cognitive Assessment/Intervention- PT/OT    Orientation Status (Cognition)  oriented x 3  -MS     Follows Commands (Cognition)  WNL  -MS     Personal Safety Interventions  fall prevention program maintained;gait belt;nonskid shoes/slippers when out of bed;supervised activity  -MS     Row Name 04/15/19 1343          Mobility Assessment/Treatment    Extremity Weight-bearing Status  left lower extremity  -MS     Left Lower Extremity (Weight-bearing Status)  weight-bearing as tolerated (WBAT)  -MS     Row Name 04/15/19 1343          Bed Mobility Assessment/Treatment    Bed Mobility Assessment/Treatment  supine-sit;sit-supine  -MS     Supine-Sit [place] (Bed Mobility)  contact guard  -MS     Row Name 04/15/19 1343          Transfer Assessment/Treatment    Transfer Assessment/Treatment  sit-stand transfer;stand-sit transfer  -MS     Sit-Stand [place] (Transfers)  minimum assist (75% patient effort);2 person assist  -MS     Stand-Sit [place] (Transfers)  minimum assist (75% patient effort);2 person assist  -MS     Row Name 04/15/19 1343          Sit-Stand Transfer    Assistive Device (Sit-Stand Transfers)  walker, front-wheeled  -MS     Row Name 04/15/19 1343          Stand-Sit Transfer    Assistive Device (Stand-Sit Transfers)  walker, front-wheeled  -MS     Row Name 04/15/19  7583          Gait/Stairs Assessment/Training    [place] Level (Gait)  minimum assist (75% patient effort);1 person to manage equipment  -MS     Assistive Device (Gait)  walker, front-wheeled  -MS     Distance in Feet (Gait)  6 feet  -MS     Pattern (Gait)  step-to  -MS     Deviations/Abnormal Patterns (Gait)  antalgic;stride length decreased  -MS     Comment (Gait/Stairs)  Pt. had to maintain PWB Left L.E. due to pain and Left knee buckling.  Limited in gait distance due to overall fatigue, weakness, pain.  -MS     Row Name 04/15/19 1343          General ROM    GENERAL ROM COMMENTS  BUE/RLE (WFL's)  -MS     Row Name 04/15/19 1343          MMT (Manual Muscle Testing)    General MMT Comments  BUE/RLE (3+/5)  -MS     Row Name 04/15/19 1343          Therapeutic Exercise    Comment (Therapeutic Exercise)  Left THR protocol x 10 reps completed  -MS     Row Name 04/15/19 1343          Pain Assessment    Additional Documentation  Pain Scale: Numbers Pre/Post-Treatment (Group)  -MS     Row Name 04/15/19 1343          Pain Scale: Numbers Pre/Post-Treatment    Pain Scale: Numbers, Pretreatment  5/10  -MS     Pain Scale: Numbers, Post-Treatment  5/10  -MS     Pain Location - Side  Left  -MS     Pain Location  hip  -MS     Pain Intervention(s)  Medication (See MAR);Cold applied;Repositioned;Elevated;Rest  -MS     Row Name             Wound 04/15/19 0901 Left hip incision    Wound - Properties Group Date first assessed: 04/15/19  -SB Time first assessed: 0901  -SB Side: Left  -SB Location: hip  -SB Type: incision  -SB    Row Name 04/15/19 1343          Physical Therapy Clinical Impression    Date of Referral to PT  04/15/19  -MS     Criteria for Skilled Interventions Met (PT Clinical Impression)  treatment indicated  -MS     Rehab Potential (PT Clinical Summary)  good, to achieve stated therapy goals  -MS     Row Name 04/15/19 9323          Physical Therapy Goals    Bed Mobility Goal Selection (PT)  bed mobility, PT goal 1   -MS     Transfer Goal Selection (PT)  transfer, PT goal 1  -MS     Gait Training Goal Selection (PT)  gait training, PT goal 1  -MS     Stairs Goal Selection (PT)  stairs, PT goal 1  -MS     Additional Documentation  Stairs Goal Selection (PT) (Row)  -MS     Row Name 04/15/19 1343          Bed Mobility Goal 1 (PT)    Activity/Assistive Device (Bed Mobility Goal 1, PT)  bed mobility activities, all  -MS     [place] Level/Cues Needed (Bed Mobility Goal 1, PT)  independent  -MS     Time Frame (Bed Mobility Goal 1, PT)  long term goal (LTG);3 days  -MS     Row Name 04/15/19 1343          Transfer Goal 1 (PT)    Activity/Assistive Device (Transfer Goal 1, PT)  transfers, all;walker, rolling  -MS     [place] Level/Cues Needed (Transfer Goal 1, PT)  independent  -MS     Time Frame (Transfer Goal 1, PT)  long term goal (LTG);2 - 3 days  -MS     Row Name 04/15/19 1343          Gait Training Goal 1 (PT)    Activity/Assistive Device (Gait Training Goal 1, PT)  gait (walking locomotion);walker, rolling  -MS     [place] Level (Gait Training Goal 1, PT)  independent  -MS     Distance (Gait Goal 1, PT)  100 feet  -MS     Time Frame (Gait Training Goal 1, PT)  long term goal (LTG);2 - 3 days  -MS     Row Name 04/15/19 1343          Stairs Goal 1 (PT)    Activity/Assistive Device (Stairs Goal 1, PT)  stairs, all skills  -MS     [place] Level/Cues Needed (Stairs Goal 1, PT)  contact guard assist  -MS     Number of Stairs (Stairs Goal 1, PT)  3 With handrail  -MS     Time Frame (Stairs Goal 1, PT)  long term goal (LTG);3 days  -MS     Row Name 04/15/19 1343          Positioning and Restraints    Pre-Treatment Position  in bed  -MS     Post Treatment Position  chair  -MS     In Chair  notified nsg;reclined;sitting;call light within reach;encouraged to call for assist;with family/caregiver All lines intact. Ice pack to Left hip.  -MS       User Key  (r) = Recorded By, (t) = Taken By, (c) = Cosigned By    Initials  Name Provider Type    Williams Samayoa EVERARDO, PT Physical Therapist    Jannet Dyson, RN Registered Nurse        Physical Therapy Education     Title: PT OT SLP Therapies (Done)     Topic: Physical Therapy (Done)     Point: Mobility training (Done)     Learning Progress Summary           Patient Acceptance, E,D, VU,NR by MS at 4/15/2019  1:48 PM                   Point: Home exercise program (Done)     Learning Progress Summary           Patient Acceptance, E,D, VU,NR by MS at 4/15/2019  1:48 PM                   Point: Body mechanics (Done)     Learning Progress Summary           Patient Acceptance, E,D, VU,NR by MS at 4/15/2019  1:48 PM                   Point: Precautions (Done)     Learning Progress Summary           Patient Acceptance, E,D, VU,NR by MS at 4/15/2019  1:48 PM                               User Key     Initials Effective Dates Name Provider Type Discipline    MS 04/03/18 -  Williams Kan EVERARDO, PT Physical Therapist PT              PT Recommendation and Plan  Anticipated Discharge Disposition (PT): home with assist, home with home health  Planned Therapy Interventions (PT Eval): balance training, bed mobility training, gait training, home exercise program, patient/family education, postural re-education, stair training, strengthening, transfer training  Therapy Frequency (PT Clinical Impression): 2 times/day  Outcome Summary/Treatment Plan (PT)  Anticipated Discharge Disposition (PT): home with assist, home with home health  Plan of Care Reviewed With: patient, family  Outcome Summary: Pt. presents with typical post op impairments related to THR surgery that include decreased strength, ROM, and overall balance.  Pt. will benefit from skilled inpt. P.T. to address her functional deficits and to assist pt. in regaining her maximum level of independence with functional mobility.   Outcome Measures     Row Name 04/15/19 1300             How much help from another person do you currently  need...    Turning from your back to your side while in flat bed without using bedrails?  4  -MS      Moving from lying on back to sitting on the side of a flat bed without bedrails?  3  -MS      Moving to and from a bed to a chair (including a wheelchair)?  3  -MS      Standing up from a chair using your arms (e.g., wheelchair, bedside chair)?  2  -MS      Climbing 3-5 steps with a railing?  2  -MS      To walk in hospital room?  3  -MS      AM-PAC 6 Clicks Score  17  -MS         Functional Assessment    Outcome Measure Options  AM-PAC 6 Clicks Basic Mobility (PT)  -MS        User Key  (r) = Recorded By, (t) = Taken By, (c) = Cosigned By    Initials Name Provider Type    Williams Samayoa, PT Physical Therapist         Time Calculation:   PT Charges     Row Name 04/15/19 1350             Time Calculation    Start Time  1315  -MS      Stop Time  1335  -MS      Time Calculation (min)  20 min  -MS      PT Received On  04/15/19  -MS      PT - Next Appointment  04/16/19  -MS      PT Goal Re-Cert Due Date  04/17/19  -MS         Time Calculation- PT    Total Timed Code Minutes- PT  17 minute(s)  -MS        User Key  (r) = Recorded By, (t) = Taken By, (c) = Cosigned By    Initials Name Provider Type    Williams Samayoa, PT Physical Therapist        Therapy Charges for Today     Code Description Service Date Service Provider Modifiers Qty    00089453198 HC PT EVAL LOW COMPLEXITY 1 4/15/2019 Williams Kan, PT GP 1    06765756465 HC PT THER PROC EA 15 MIN 4/15/2019 Williams Kan, PT GP 1    51889454508 HC PT THER SUPP EA 15 MIN 4/15/2019 Williams Kan, PT GP 1          PT G-Codes  Outcome Measure Options: AM-PAC 6 Clicks Basic Mobility (PT)  AM-PAC 6 Clicks Score: 17      Willaims Kan, PT  4/15/2019

## 2019-04-15 NOTE — ANESTHESIA POSTPROCEDURE EVALUATION
"Patient: Maryjo Wynn    Procedure Summary     Date:  04/15/19 Room / Location:  [facility] OR [address] [place] [address] MAIN OR    Anesthesia Start:  0807 Anesthesia Stop:  0956    Procedure:  LEFT TOTAL HIP ARTHROPLASTY (Left Hip) Diagnosis:      Surgeon:  Josh Ramírez MD Provider:  Osbaldo Peña MD    Anesthesia Type:  general ASA Status:  4          Anesthesia Type: general  Last vitals  BP   134/64 (04/15/19 1015)   Temp   36.5 °C (97.7 °F) (04/15/19 0954)   Pulse   78 (04/15/19 1015)   Resp   16 (04/15/19 1015)     SpO2   100 % (04/15/19 1015)     Post Anesthesia Care and Evaluation    Patient location during evaluation: bedside  Patient participation: complete - patient participated  Level of consciousness: awake and alert  Pain management: adequate  Airway patency: patent  Anesthetic complications: No anesthetic complications  PONV Status: none  Cardiovascular status: acceptable  Respiratory status: acceptable  Hydration status: acceptable    Comments: /64   Pulse 78   Temp 36.5 °C (97.7 °F) (Oral)   Resp 16   Ht 152.4 cm (60\")   Wt 49 kg (108 lb)   SpO2 100%   BMI 21.09 kg/m²         "

## 2019-04-15 NOTE — PLAN OF CARE
Problem: Patient Care Overview  Goal: Plan of Care Review  Outcome: Ongoing (interventions implemented as appropriate)   04/15/19 1215   Coping/Psychosocial   Plan of Care Reviewed With patient;family   OTHER   Outcome Summary LTH today, arrived to floor 1135, A&O, HV drain, bulky dressing c/d/i, worked w/PT, 2L-oxygen therapy at home, redness around eyes from tape in OR, VSS, educated on bp monitoring   Plan of Care Review   Progress improving     Goal: Individualization and Mutuality  Outcome: Ongoing (interventions implemented as appropriate)      Problem: Fall Risk (Adult)  Goal: Identify Related Risk Factors and Signs and Symptoms  Outcome: Outcome(s) achieved Date Met: 04/15/19    Goal: Absence of Fall  Outcome: Outcome(s) achieved Date Met: 04/15/19      Problem: Hip Arthroplasty (Total, Partial) (Adult)  Goal: Signs and Symptoms of Listed Potential Problems Will be Absent, Minimized or Managed (Hip Arthroplasty)  Outcome: Ongoing (interventions implemented as appropriate)    Goal: Anesthesia/Sedation Recovery  Outcome: Ongoing (interventions implemented as appropriate)

## 2019-04-15 NOTE — PLAN OF CARE
Problem: Patient Care Overview  Goal: Plan of Care Review   04/15/19 5028   Coping/Psychosocial   Plan of Care Reviewed With patient;family   OTHER   Outcome Summary Pt. presents with typical post op impairments related to THR surgery that include decreased strength, ROM, and overall balance. Pt. will benefit from skilled inpt. P.T. to address her functional deficits and to assist pt. in regaining her maximum level of independence with functional mobility.

## 2019-04-15 NOTE — ANESTHESIA PREPROCEDURE EVALUATION
Anesthesia Evaluation     Patient summary reviewed and Nursing notes reviewed   NPO Solid Status: > 8 hours  NPO Liquid Status: > 4 hours           Airway   Mallampati: II  Neck ROM: full  Possible difficult intubation  Dental    (+) partials    Comment: Partial upper    Pulmonary     breath sounds clear to auscultation  (+) COPD, asthma,   Cardiovascular     Rhythm: regular    (+) hypertension, CAD, CABG, PVD, hyperlipidemia,       Neuro/Psych  (+) psychiatric history Anxiety and Depression,     GI/Hepatic/Renal/Endo      Musculoskeletal     Abdominal    Substance History      OB/GYN          Other   (+) arthritis                     Anesthesia Plan    ASA 4     general   (O2 sat 97% on 2L O2 NC)  intravenous induction   Anesthetic plan, all risks, benefits, and alternatives have been provided, discussed and informed consent has been obtained with: patient.

## 2019-04-15 NOTE — NURSING NOTE
Notified family (daughter Aydee) that pt has redness around bilateral eyes due to tape during surgery. Aydee stated pt has very fragile skin and it has happened before.

## 2019-04-15 NOTE — ANESTHESIA PROCEDURE NOTES
Airway  Urgency: elective    Airway not difficult    General Information and Staff    Patient location during procedure: OR  Anesthesiologist: Osbaldo Peña MD  CRNA: Meghan Starr CRNA    Indications and Patient Condition  Indications for airway management: airway protection    Preoxygenated: yes  Mask difficulty assessment: 1 - vent by mask    Final Airway Details  Final airway type: endotracheal airway      Successful airway: ETT  Cuffed: yes   Successful intubation technique: video laryngoscopy  Endotracheal tube insertion site: oral  ETT size (mm): 7.0  Cormack-Lehane Classification: grade I - full view of glottis  Placement verified by: chest auscultation and capnometry   Measured from: lips  ETT to lips (cm): 21  Number of attempts at approach: 1    Additional Comments  Smooth IV/mask induction/intubation. Easy bag-mask ventilation. Orally intubated, easy, atraumatic, lips/teeth/mouth left intact, as preop. Direct visual of vocal cords. +ETCO2, bilateral breath sounds and equal.

## 2019-04-15 NOTE — NURSING NOTE
Pt arrived to pacu with bruising and redness around both eyes. Per handoff with prior RN, this was caused by the tape used in the OR.

## 2019-04-16 NOTE — THERAPY TREATMENT NOTE
Acute Care - Physical Therapy Treatment Note  [facility]     Patient Name: Maryjo Wynn  : 1945  MRN: [id number]  Today's Date: 2019  Onset of Illness/Injury or Date of Surgery: 04/15/19  Date of Referral to PT: 04/15/19  Referring Physician: Josh Haywood    Admit Date: 4/15/2019    Visit Dx:    ICD-10-CM ICD-9-CM   1. Difficulty walking R26.2 719.7     Patient Active Problem List   Diagnosis   • Degenerative joint disease (DJD) of hip       Therapy Treatment    Rehabilitation Treatment Summary     Row Name 19 1346 19 1147          Treatment Time/Intention    Discipline  physical therapy assistant  -  physical therapy assistant  -     Document Type  therapy note (daily note)  -  therapy note (daily note)  -     Subjective Information  no complaints  -  complains of;pain  -     Mode of Treatment  physical therapy;group therapy  -  physical therapy;individual therapy  -     Patient/Family Observations  --  pt sitting up in chair  -     Care Plan Review  patient/other agree to care plan  -  patient/other agree to care plan  -     Therapy Frequency (PT Clinical Impression)  2 times/day  -  2 times/day  -     Patient Effort  good  -EH  good  -     Comment  pt on 2L of oxygen  -  pt on 2L of oxygen  -     Existing Precautions/Restrictions  fall;hip, posterior;oxygen therapy device and L/min LLE  -  fall;hip, posterior;oxygen therapy device and L/min LLE  -EH     Recorded by [] Shirley Yee PTA 19 1347 [EH] Shirley Yee PTA 19 1149     Row Name 19 1346 19 1147          Cognitive Assessment/Intervention- PT/OT    Orientation Status (Cognition)  oriented x 3  -EH  oriented x 3  -EH     Follows Commands (Cognition)  WNL  -EH  WNL  -EH     Personal Safety Interventions  fall prevention program maintained;gait belt;nonskid shoes/slippers when out of bed  -  fall prevention program maintained;gait belt;nonskid shoes/slippers  when out of bed  -EH     Recorded by [] Shirley Yee, [facility] 04/16/19 1347 [] Shirley Yee, [facility] 04/16/19 1149     Row Name 04/16/19 1346 04/16/19 1147          Mobility Assessment/Intervention    Extremity Weight-bearing Status  left lower extremity  -EH  left lower extremity  -EH     Left Lower Extremity (Weight-bearing Status)  weight-bearing as tolerated (WBAT)  -EH  weight-bearing as tolerated (WBAT)  -EH     Recorded by [] Shirley Yee, [facility] 04/16/19 1347 [] Shirley Yee, [facility] 04/16/19 1149     Row Name 04/16/19 1147             Bed Mobility Assessment/Treatment    Bed Mobility Assessment/Treatment  supine-sit;sit-supine  -      Supine-Sit [place] (Bed Mobility)  contact guard  -EH      Recorded by [] Shirley Yee, [facility] 04/16/19 1149      Row Name 04/16/19 1346 04/16/19 1147          Transfer Assessment/Treatment    Transfer Assessment/Treatment  sit-stand transfer;stand-sit transfer  -  sit-stand transfer;stand-sit transfer  -     Recorded by [] Shirley Yee, [facility] 04/16/19 1347 [] Shirley Yee, [facility] 04/16/19 1149     Row Name 04/16/19 1346 04/16/19 1147          Sit-Stand Transfer    Sit-Stand [place] (Transfers)  contact guard  -  contact guard  -     Assistive Device (Sit-Stand Transfers)  walker, front-wheeled  -  walker, front-wheeled  -EH     Recorded by [] Shirley Yee, [facility] 04/16/19 1347 [] Shirley Yee, [facility] 04/16/19 1149     Row Name 04/16/19 1346 04/16/19 1147          Stand-Sit Transfer    Stand-Sit [place] (Transfers)  contact guard  -  contact guard  -     Assistive Device (Stand-Sit Transfers)  walker, front-wheeled  -  walker, front-wheeled  -EH     Recorded by [] Shirley Yee, [facility] 04/16/19 1347 [] Shirley Yee, [facility] 04/16/19 1149     Row Name 04/16/19 1346 04/16/19 1147          Gait/Stairs Assessment/Training    [place] Level (Gait)  contact guard  -EH  contact guard  -EH     Assistive Device (Gait)  walker, front-wheeled  -EH   walker, front-wheeled  -     Distance in Feet (Gait)  10  -EH  25  -EH     Pattern (Gait)  step-to  -EH  step-to  -EH     Deviations/Abnormal Patterns (Gait)  antalgic;stride length decreased  -EH  antalgic;stride length decreased  -EH     Recorded by [] Shirley Yee, [facility] 04/16/19 1347 [] Shirley Yee, [facility] 04/16/19 1149     Row Name 04/16/19 1346 04/16/19 1147          Therapeutic Exercise    Comment (Therapeutic Exercise)  Left THR protocol X20 reps  -EH  Left THR protocol X15 reps  -EH     Recorded by [] Shirley Yee, [facility] 04/16/19 1347 [] Shirley Yee, [facility] 04/16/19 1149     Row Name 04/16/19 1346 04/16/19 1147          Positioning and Restraints    Pre-Treatment Position  sitting in chair/recliner  -EH  sitting in chair/recliner  -EH     Post Treatment Position  chair  -EH  chair  -EH     In Chair  reclined;call light within reach;encouraged to call for assist;with family/caregiver  -EH  reclined;call light within reach;encouraged to call for assist;with family/caregiver  -EH     Recorded by [] Shirley Yee, [facility] 04/16/19 1347 [] Shirley Yee, [facility] 04/16/19 1149     Row Name 04/16/19 1346 04/16/19 1147          Pain Scale: Numbers Pre/Post-Treatment    Pain Scale: Numbers, Pretreatment  --  --  -     Pain Scale: Numbers, Post-Treatment  --  --  -     Pain Location - Side  Left  -EH  Left  -EH     Pain Location  hip  -EH  hip  -EH     Pain Intervention(s)  --  Repositioned;Ambulation/increased activity;Cold applied  -     Recorded by [] Shirley Yee, [facility] 04/16/19 1347 [] Shirley Yee, [facility] 04/16/19 1149     Row Name 04/16/19 1147             Pain Scale: Word Pre/Post-Treatment    Pain: Word Scale, Pretreatment  2 - mild pain  -      Recorded by [] Shirley Yee, [facility] 04/16/19 1149      Row Name                Wound 04/15/19 0901 Left hip incision    Wound - Properties Group Date first assessed: 04/15/19 [SB] Time first assessed: 0901 [SB] Side: Left [SB] Location: hip [SB] Type:  incision [SB] Recorded by:  [SB] Jannet Braxton RN 04/15/19 0901    Row Name 04/16/19 1346 04/16/19 1147          Outcome Summary/Treatment Plan (PT)    Anticipated Discharge Disposition (PT)  home with assist;home with home health  -  home with assist;home with home health  -     Recorded by [EH] Shirley Yee, PTA 04/16/19 1347 [EH] Shirley Yee, PTA 04/16/19 1149       User Key  (r) = Recorded By, (t) = Taken By, (c) = Cosigned By    Initials Name Effective Dates Discipline    SB Jannet Braxton RN 06/16/16 -  Nurse    EH Shirley Yee PTA 08/19/18 -  PT          Wound 04/15/19 0901 Left hip incision (Active)   Dressing Appearance dry;intact;moist drainage 4/16/2019 10:33 AM   Closure Approximated 4/16/2019 10:33 AM   Base dressing in place, unable to visualize 4/16/2019  8:28 AM   Drainage Characteristics/Odor sanguineous 4/16/2019 10:33 AM   Drainage Amount small 4/16/2019 10:33 AM   Dressing Care, Wound dressing changed;border dressing 4/16/2019 10:33 AM           Physical Therapy Education     Title: PT OT SLP Therapies (Done)     Topic: Physical Therapy (Done)     Point: Mobility training (Done)     Learning Progress Summary           Patient Acceptance, E,D, VU,DU by  at 4/16/2019 11:47 AM    Acceptance, E,D, VU,NR by MS at 4/15/2019  1:48 PM                   Point: Home exercise program (Done)     Learning Progress Summary           Patient Acceptance, E,D, VU,DU by  at 4/16/2019 11:47 AM    Acceptance, E,D, VU,NR by MS at 4/15/2019  1:48 PM                   Point: Body mechanics (Done)     Learning Progress Summary           Patient Acceptance, E,D, VU,DU by  at 4/16/2019 11:47 AM    Acceptance, E,D, VU,NR by MS at 4/15/2019  1:48 PM                   Point: Precautions (Done)     Learning Progress Summary           Patient Acceptance, E,D, VU,DU by  at 4/16/2019 11:47 AM    Acceptance, E,D, VU,NR by MS at 4/15/2019  1:48 PM                               User Key      Initials Effective Dates Name Provider Type Discipline    MS 04/03/18 -  LoretaWilliams, PT Physical Therapist PT     08/19/18 -  Shirley Yee PTA Physical Therapy Assistant PT                PT Recommendation and Plan  Anticipated Discharge Disposition (PT): home with assist, home with home health  Therapy Frequency (PT Clinical Impression): 2 times/day  Outcome Summary/Treatment Plan (PT)  Anticipated Discharge Disposition (PT): home with assist, home with home health  Plan of Care Reviewed With: patient  Progress: improving  Outcome Summary: pt tolerated treatment with c/o pain of left hip. Pt able to perform RIVKA protocol exercises with minimal c/o difficulty. Pt ambulated 25 feet with rwx, CGA.  Outcome Measures     Row Name 04/16/19 1100 04/15/19 1300          How much help from another person do you currently need...    Turning from your back to your side while in flat bed without using bedrails?  4  -EH  4  -MS     Moving from lying on back to sitting on the side of a flat bed without bedrails?  3  -EH  3  -MS     Moving to and from a bed to a chair (including a wheelchair)?  3  -EH  3  -MS     Standing up from a chair using your arms (e.g., wheelchair, bedside chair)?  3  -EH  2  -MS     Climbing 3-5 steps with a railing?  2  -EH  2  -MS     To walk in hospital room?  3  -EH  3  -MS     AM-PAC 6 Clicks Score  18  -EH  17  -MS        Functional Assessment    Outcome Measure Options  --  AM-PAC 6 Clicks Basic Mobility (PT)  -MS       User Key  (r) = Recorded By, (t) = Taken By, (c) = Cosigned By    Initials Name Provider Type    Williams Samayoa EVERARDO, PT Physical Therapist     Shirley Yee PTA Physical Therapy Assistant         Time Calculation:   PT Charges     Row Name 04/16/19 1345 04/16/19 1146          Time Calculation    Start Time  1300  -EH  1105  -EH     Stop Time  1325  -EH  1120  -EH     Time Calculation (min)  25 min  -EH  15 min  -     PT Received On  04/16/19  -  04/16/19  -      PT - Next Appointment  04/17/19  -EH  04/16/19  -EH        Time Calculation- PT    Total Timed Code Minutes- PT  25 minute(s)  -EH  15 minute(s)  -EH       User Key  (r) = Recorded By, (t) = Taken By, (c) = Cosigned By    Initials Name Provider Type     Shirley Yee PTA Physical Therapy Assistant        Therapy Charges for Today     Code Description Service Date Service Provider Modifiers Qty    04627312965 HC PT THER PROC EA 15 MIN 4/16/2019 Shirley Yee PTA GP 1    48387174338 HC PT THER PROC EA 15 MIN 4/16/2019 Shirley Yee PTA GP 1    31261765912 HC PT THER PROC GROUP 4/16/2019 Shirley Yee PTA GP 1    65948182202 HC PT THER SUPP EA 15 MIN 4/16/2019 Shirley Yee PTA GP 1          PT G-Codes  Outcome Measure Options: AM-PAC 6 Clicks Basic Mobility (PT)  AM-PAC 6 Clicks Score: 18    Shirley Yee PTA  4/16/2019

## 2019-04-16 NOTE — PLAN OF CARE
Problem: Patient Care Overview  Goal: Interprofessional Rounds/Family Conf  Outcome: Ongoing (interventions implemented as appropriate)   04/16/19 0322   Interdisciplinary Rounds/Family Conf   Participants respiratory therapy

## 2019-04-16 NOTE — PLAN OF CARE
Problem: Patient Care Overview  Goal: Plan of Care Review  Outcome: Ongoing (interventions implemented as appropriate)   04/16/19 0204   Coping/Psychosocial   Plan of Care Reviewed With patient;daughter   OTHER   Outcome Summary VSS. NVI. Dressing CDI. Hemovac with serousanguenous drainage. Up to BSC with assist X1. PT refused to ambulate in room or to bathroom. Encouraged mobility. Edema, erythema and bruising remain to bilat eyes r/t surgery. Has had minimal c/o pain. Verbalizes understanding of education including monitoring b/p to manage HTN. Plans for d/c home when appropriate   Plan of Care Review   Progress improving       Problem: Fall Risk (Adult)  Goal: Absence of Fall  Outcome: Ongoing (interventions implemented as appropriate)      Problem: Hip Arthroplasty (Total, Partial) (Adult)  Goal: Signs and Symptoms of Listed Potential Problems Will be Absent, Minimized or Managed (Hip Arthroplasty)  Outcome: Ongoing (interventions implemented as appropriate)

## 2019-04-16 NOTE — THERAPY TREATMENT NOTE
Acute Care - Physical Therapy Treatment Note  [facility]     Patient Name: Maryjo Wynn  : 1945  MRN: [id number]  Today's Date: 2019  Onset of Illness/Injury or Date of Surgery: 04/15/19  Date of Referral to PT: 04/15/19  Referring Physician: Josh Haywood    Admit Date: 4/15/2019    Visit Dx:    ICD-10-CM ICD-9-CM   1. Difficulty walking R26.2 719.7     Patient Active Problem List   Diagnosis   • Degenerative joint disease (DJD) of hip       Therapy Treatment    Rehabilitation Treatment Summary     Row Name 19 1147             Treatment Time/Intention    Discipline  physical therapy assistant  -      Document Type  therapy note (daily note)  -      Subjective Information  complains of;pain  -      Mode of Treatment  physical therapy;individual therapy  -      Patient/Family Observations  pt sitting up in chair  -      Care Plan Review  patient/other agree to care plan  -      Therapy Frequency (PT Clinical Impression)  2 times/day  -      Patient Effort  good  -      Comment  pt on 2L of oxygen  -      Existing Precautions/Restrictions  fall;hip, posterior;oxygen therapy device and L/min LLE  -EH      Recorded by [] Shirley Yee PTA 19 1149      Row Name 19 1147             Cognitive Assessment/Intervention- PT/OT    Orientation Status (Cognition)  oriented x 3  -EH      Follows Commands (Cognition)  WNL  -      Personal Safety Interventions  fall prevention program maintained;gait belt;nonskid shoes/slippers when out of bed  -EH      Recorded by [] Shirley Yee PTA 19 1149      Row Name 19 1147             Mobility Assessment/Intervention    Extremity Weight-bearing Status  left lower extremity  -      Left Lower Extremity (Weight-bearing Status)  weight-bearing as tolerated (WBAT)  -EH      Recorded by [] Shirley Yee PTA 19 1149      Row Name 19 1147             Bed Mobility Assessment/Treatment    Bed Mobility  Assessment/Treatment  supine-sit;sit-supine  -EH      Supine-Sit [place] (Bed Mobility)  contact guard  -EH      Recorded by [] Shirley Yee, [facility] 04/16/19 1149      Row Name 04/16/19 1147             Transfer Assessment/Treatment    Transfer Assessment/Treatment  sit-stand transfer;stand-sit transfer  -EH      Recorded by [] Shirley Yee [facility] 04/16/19 1149      Row Name 04/16/19 1147             Sit-Stand Transfer    Sit-Stand [place] (Transfers)  contact guard  -EH      Assistive Device (Sit-Stand Transfers)  walker, front-wheeled  -EH      Recorded by [] Shirley Yee [facility] 04/16/19 1149      Row Name 04/16/19 1147             Stand-Sit Transfer    Stand-Sit [place] (Transfers)  contact guard  -EH      Assistive Device (Stand-Sit Transfers)  walker, front-wheeled  -EH      Recorded by [] Shirley Yee [facility] 04/16/19 1149      Row Name 04/16/19 1147             Gait/Stairs Assessment/Training    [place] Level (Gait)  contact guard  -EH      Assistive Device (Gait)  walker, front-wheeled  -EH      Distance in Feet (Gait)  25  -EH      Pattern (Gait)  step-to  -EH      Deviations/Abnormal Patterns (Gait)  antalgic;stride length decreased  -EH      Recorded by [] Shirley Yee, [facility] 04/16/19 1149      Row Name 04/16/19 1147             Therapeutic Exercise    Comment (Therapeutic Exercise)  Left THR protocol X15 reps  -EH      Recorded by [] Shirley Yee [facility] 04/16/19 1149      Row Name 04/16/19 1147             Positioning and Restraints    Pre-Treatment Position  sitting in chair/recliner  -EH      Post Treatment Position  chair  -EH      In Chair  reclined;call light within reach;encouraged to call for assist;with family/caregiver  -EH      Recorded by [] Shirley Yee, [facility] 04/16/19 1149      Row Name 04/16/19 1147             Pain Scale: Numbers Pre/Post-Treatment    Pain Scale: Numbers, Pretreatment  --  -      Pain Scale: Numbers, Post-Treatment  --  -      Pain Location -  Side  Left  -EH      Pain Location  hip  -EH      Pain Intervention(s)  Repositioned;Ambulation/increased activity;Cold applied  -EH      Recorded by [] Shirley Yee PTA 04/16/19 1149      Row Name 04/16/19 1147             Pain Scale: Word Pre/Post-Treatment    Pain: Word Scale, Pretreatment  2 - mild pain  -EH      Recorded by [] Shirley Yee PTA 04/16/19 1149      Row Name                Wound 04/15/19 0901 Left hip incision    Wound - Properties Group Date first assessed: 04/15/19 [SB] Time first assessed: 0901 [SB] Side: Left [SB] Location: hip [SB] Type: incision [SB] Recorded by:  [] Jannet Braxton RN 04/15/19 0901    Row Name 04/16/19 1147             Outcome Summary/Treatment Plan (PT)    Anticipated Discharge Disposition (PT)  home with assist;home with home health  -EH      Recorded by [] Shirley Yee PTA 04/16/19 1149        User Key  (r) = Recorded By, (t) = Taken By, (c) = Cosigned By    Initials Name Effective Dates Discipline    SB Jannet Braxton RN 06/16/16 -  Nurse     Shirley Yee PTA 08/19/18 -  PT          Wound 04/15/19 0901 Left hip incision (Active)   Dressing Appearance dry;intact;moist drainage 4/16/2019 10:33 AM   Closure Approximated 4/16/2019 10:33 AM   Base dressing in place, unable to visualize 4/16/2019  8:28 AM   Drainage Characteristics/Odor sanguineous 4/16/2019 10:33 AM   Drainage Amount small 4/16/2019 10:33 AM   Dressing Care, Wound dressing changed;border dressing 4/16/2019 10:33 AM           Physical Therapy Education     Title: PT OT SLP Therapies (Done)     Topic: Physical Therapy (Done)     Point: Mobility training (Done)     Learning Progress Summary           Patient Acceptance, E,D, VU,DU by  at 4/16/2019 11:47 AM    Acceptance, E,D, VU,NR by MS at 4/15/2019  1:48 PM                   Point: Home exercise program (Done)     Learning Progress Summary           Patient Acceptance, E,D, VU,DU by  at 4/16/2019 11:47 AM    Acceptance,  E,D, VU,NR by MS at 4/15/2019  1:48 PM                   Point: Body mechanics (Done)     Learning Progress Summary           Patient Acceptance, E,D, VU,DU by  at 4/16/2019 11:47 AM    Acceptance, E,D, VU,NR by MS at 4/15/2019  1:48 PM                   Point: Precautions (Done)     Learning Progress Summary           Patient Acceptance, E,D, VU,DU by  at 4/16/2019 11:47 AM    Acceptance, E,D, VU,NR by MS at 4/15/2019  1:48 PM                               User Key     Initials Effective Dates Name Provider Type Discipline    MS 04/03/18 -  Williams Kan, PT Physical Therapist PT     08/19/18 -  Shirley Yee PTA Physical Therapy Assistant PT                PT Recommendation and Plan  Anticipated Discharge Disposition (PT): home with assist, home with home health  Therapy Frequency (PT Clinical Impression): 2 times/day  Outcome Summary/Treatment Plan (PT)  Anticipated Discharge Disposition (PT): home with assist, home with home health  Plan of Care Reviewed With: patient  Progress: improving  Outcome Summary: pt tolerated treatment with c/o pain of left hip. Pt able to perform RIVKA protocol exercises with minimal c/o difficulty. Pt ambulated 25 feet with rwx, CGA.  Outcome Measures     Row Name 04/16/19 1100 04/15/19 1300          How much help from another person do you currently need...    Turning from your back to your side while in flat bed without using bedrails?  4  -EH  4  -MS     Moving from lying on back to sitting on the side of a flat bed without bedrails?  3  -EH  3  -MS     Moving to and from a bed to a chair (including a wheelchair)?  3  -EH  3  -MS     Standing up from a chair using your arms (e.g., wheelchair, bedside chair)?  3  -EH  2  -MS     Climbing 3-5 steps with a railing?  2  -EH  2  -MS     To walk in hospital room?  3  -EH  3  -MS     AM-PAC 6 Clicks Score  18  -EH  17  -MS        Functional Assessment    Outcome Measure Options  --  AM-PAC 6 Clicks Basic Mobility (PT)  -MS        User Key  (r) = Recorded By, (t) = Taken By, (c) = Cosigned By    Initials Name Provider Type    MS Kan Williams EVERARDO, PT Physical Therapist     Shirley Yee PTA Physical Therapy Assistant         Time Calculation:   PT Charges     Row Name 04/16/19 1146             Time Calculation    Start Time  1105  -      Stop Time  1120  -      Time Calculation (min)  15 min  -      PT Received On  04/16/19  -      PT - Next Appointment  04/16/19  -         Time Calculation- PT    Total Timed Code Minutes- PT  15 minute(s)  -        User Key  (r) = Recorded By, (t) = Taken By, (c) = Cosigned By    Initials Name Provider Type    Shirley Boykin PTA Physical Therapy Assistant        Therapy Charges for Today     Code Description Service Date Service Provider Modifiers Qty    08539661235 HC PT THER PROC EA 15 MIN 4/16/2019 Shirley Yee PTA GP 1          PT G-Codes  Outcome Measure Options: AM-PAC 6 Clicks Basic Mobility (PT)  AM-PAC 6 Clicks Score: 18    Shirley Yee PTA  4/16/2019

## 2019-04-16 NOTE — PROGRESS NOTES
"  Orthopaedic Surgery   Daily Progress Note  Dr. Josh Ramírez Sr  [phone]  DEMOGRAPHICS:   · Maryjo Wynn   · Age:74 y.o.   · MRN:[id number]  · Admitted: 4/15/2019    PROCEDURE: 1 Day Post-Op s/p Procedure(s):  LEFT TOTAL HIP ARTHROPLASTY     /88 (BP Location: Right arm, Patient Position: Sitting)   Pulse 110   Temp 97.1 °F (36.2 °C) (Oral)   Resp 18   Ht 152.4 cm (60\")   Wt 49 kg (108 lb)   SpO2 98%   BMI 21.09 kg/m²     Lab Results (last 24 hours)     Procedure Component Value Units Date/Time    Basic Metabolic Panel [[id number]]  (Abnormal) Collected:  04/16/19 0425    Specimen:  Blood Updated:  04/16/19 0545     Glucose 116 mg/dL      BUN 6 mg/dL      Creatinine 0.35 mg/dL      Sodium 141 mmol/L      Potassium 3.3 mmol/L      Chloride 92 mmol/L      CO2 41.0 mmol/L      Calcium 8.1 mg/dL      eGFR Non African Amer >150 mL/min/1.73      BUN/Creatinine Ratio 17.1     Anion Gap 8.0 mmol/L     Narrative:       GFR Normal >60  Chronic Kidney Disease <60  Kidney Failure <15    Hemoglobin & Hematocrit, Blood [[id number]]  (Abnormal) Collected:  04/16/19 0425    Specimen:  Blood Updated:  04/16/19 0522     Hemoglobin 7.7 g/dL      Hematocrit 26.9 %           Imaging Results (last 24 hours)     Procedure Component Value Units Date/Time    XR Hip 1 View Without Pelvis Left (Surgery Only) [[id number]] Collected:  04/15/19 1042     Updated:  04/15/19 1046    Narrative:       PORTABLE JOINT X-RAY     HISTORY: Left hip pain. Postop arthroplasty..     Portable x-ray of the left hip is provided.     FINDINGS:  There is arthroplasty hardware, positioned as expected.  No  periprosthetic fracture is identified.  There are expected post  operative   changes in the soft tissues.       Impression:       Left hip arthroplasty as expected..     This report was finalized on 4/15/2019 10:42 AM by Dr. Fritz Rosales M.D.             Patient Care Team:  Rashi Ogden MD as PCP - General (Family " Medicine)  Linda Conde MD as Consulting Physician (Obstetrics and Gynecology)  Frank Staton MD as Consulting Physician (Pulmonary Disease)  Ryan Diaz MD as Consulting Physician (Interventional Cardiology)    SUBJECTIVE  Comfortable    PHYSICAL EXAM  Neurovascular intact     ASSESSMENT: Patient is a 74 y.o. female who is 1 Day Post-Op s/p Procedure(s):  LEFT TOTAL HIP ARTHROPLASTY  Hypokalemia  Chronic anemia with superimposed acute blood loss anemia.  Preop hemoglobin 10.5.  Current hemoglobin 7.7  Oxygen dependent COPD  History of GI bleed in the past  Coronary artery disease  Peripheral vascular disease    PLAN / DISPOSITION:  81 mg aspirin daily watching GI status  Transfused 2 units packed cells  Potassium supplementation  Mobilize.  Probably Thursday before we discharge her.    Josh Ramírez Sr, MD  Orthopaedic Surgery  [facility]  [phone]

## 2019-04-16 NOTE — PLAN OF CARE
Problem: Patient Care Overview  Goal: Plan of Care Review  Outcome: Ongoing (interventions implemented as appropriate)   04/16/19 2223   Coping/Psychosocial   Plan of Care Reviewed With patient   OTHER   Outcome Summary pt tolerated treatment with c/o pain of left hip. Pt able to perform RIVKA protocol exercises with minimal c/o difficulty. Pt ambulated 25 feet with rwx, CGA.   Plan of Care Review   Progress improving

## 2019-04-16 NOTE — PROGRESS NOTES
Discharge Planning Assessment  [facility]     Patient Name: Maryjo Wynn  MRN: [id number]  Today's Date: 4/16/2019    Admit Date: 4/15/2019    Discharge Needs Assessment     Row Name 04/16/19 3593       Living Environment    Lives With  spouse    Current Living Arrangements  home/apartment/condo    Family Caregiver if Needed  child(abigail), adult;spouse    Quality of Family Relationships  helpful;involved;supportive    Able to Return to Prior Arrangements  yes       Resource/Environmental Concerns    Resource/Environmental Concerns  none       Transition Planning    Patient/Family Anticipates Transition to  home with family    Patient/Family Anticipated Services at Transition  home health care    Transportation Anticipated  family or friend will provide       Discharge Needs Assessment    Readmission Within the Last 30 Days  no previous admission in last 30 days    Concerns to be Addressed  no discharge needs identified    Equipment Currently Used at Home  oxygen;wheelchair;grab bar    Offered/Gave Vendor List  yes        Discharge Plan     Row Name 04/16/19 8250       Plan    Plan Comments  Met w/ pt and family at the bedside, verified facesheet and d/c plan. Pt plans to d/c home w/ the help of her spouse and her adult daughter. She would like [facility]. Pt is on home O2  w/ [employer]. Lisa/[facility] to notify [facility] office.     Row Name 04/16/19 9804       Plan    Plan  Home w/ family and [facility]      Patient/Family in Agreement with Plan  yes        Destination      No service coordination in this encounter.      Durable Medical Equipment      No service coordination in this encounter.      Dialysis/Infusion      No service coordination in this encounter.      Home Medical Care - Selection Complete      Service Provider Request Status Selected Services Address Phone Number Fax Number    [facility] Selected Home Health Services [address] [facility] [address]  [phone]    [facility] Pending - [facility] Sent N/A [address] [facility] [address] [facility] [address] [phone]       Annmarie Prescott RN 4/16/2019 1336    .4/16/2019  Lisa to notify [facility] office.                  Therapy      No service coordination in this encounter.      Community Resources      No service coordination in this encounter.          Demographic Summary    No documentation.       Functional Status     Row Name 04/16/19 1334       Functional Status    Usual Activity Tolerance  good    Current Activity Tolerance  fair       Functional Status, IADL    Medications  independent    Meal Preparation  independent    Housekeeping  assistive equipment    Laundry  independent    Shopping  assistive equipment and person       Mental Status    General Appearance WDL  WDL       Mental Status Summary    Recent Changes in Mental Status/Cognitive Functioning  no changes        Psychosocial    No documentation.       Abuse/Neglect    No documentation.       Legal    No documentation.       Substance Abuse    No documentation.       Patient Forms     Row Name 04/16/19 1334       Patient Forms    Provider Choice List  Delivered    Delivered to  Patient    Method of delivery  In person            Annmarie Prescott RN

## 2019-04-16 NOTE — DISCHARGE PLACEMENT REQUEST
"Paddy Wynn (74 y.o. Female)     Date of Birth Social Security Number Address Home Phone MRN    1945  [address] [address] [phone] [id number]    Spiritism Marital Status          Shinto        Admission Date Admission Type Admitting Provider Attending Provider Department, Room/Bed    4/15/19 Elective Josh Rmaírez MD Sweet, Richard A, MD [address], [facility]/1    Discharge Date Discharge Disposition Discharge Destination                       Attending Provider:  Josh Ramírez MD    Allergies:  No Known Allergies    Isolation:  None   Infection:  None   Code Status:  CPR    Ht:  152.4 cm (60\")   Wt:  49 kg (108 lb)    Admission Cmt:  None   Principal Problem:  None                Active Insurance as of 4/15/2019     Primary Coverage     Payor Plan Insurance Group Employer/Plan Group    HUMANA MEDICARE REPLACEMENT HUMANA MEDICARE REPL [id number]     Payor Plan Address Payor Plan Phone Number Payor Plan Fax Number Effective Dates    [address] [address] [phone]  1/1/2015 - None Entered    [facility] [address]       Subscriber Name Subscriber Birth Date Member ID       PADDY WYNN 1945 [id number]                 Emergency Contacts      (Rel.) Home Phone Work Phone Mobile Phone    ANAIS WYNN [phone] -- --              "

## 2019-04-17 NOTE — PROGRESS NOTES
"/76 (BP Location: Left arm, Patient Position: Lying)   Pulse 80   Temp 97.8 °F (36.6 °C) (Oral)   Resp 18   Ht 152.4 cm (60\")   Wt 49 kg (108 lb)   SpO2 99%   BMI 21.09 kg/m²     Results from last 7 days   Lab Units 04/17/19  0329   WBC 10*3/mm3 10.98*   HEMOGLOBIN g/dL 10.5*   HEMATOCRIT % 35.4   PLATELETS 10*3/mm3 188       Results from last 7 days   Lab Units 04/17/19  0329   SODIUM mmol/L 142   POTASSIUM mmol/L 3.8   CHLORIDE mmol/L 93*   CO2 mmol/L 38.9*   BUN mg/dL 6*   CREATININE mg/dL 0.24*   GLUCOSE mg/dL 105*   CALCIUM mg/dL 8.7       Imaging Results (last 24 hours)     ** No results found for the last 24 hours. **          Patient Care Team:  Rashi Ogden MD as PCP - General (Family Medicine)  Linda Conde MD as Consulting Physician (Obstetrics and Gynecology)  Frank Staton MD as Consulting Physician (Pulmonary Disease)  Ryan Diaz MD as Consulting Physician (Interventional Cardiology)    SUBJECTIVE  Doing ok.  Having trouble walking but was wheelchair bound prior to surgery  PHYSICAL EXAM  Wound looks ok     Degenerative joint disease (DJD) of hip      PLAN / DISPOSITION:  Continue to try to mobilize  Consider D/C in am  SKY Johnson  04/17/19  8:48 AM    "

## 2019-04-17 NOTE — PLAN OF CARE
Problem: Patient Care Overview  Goal: Plan of Care Review  Outcome: Ongoing (interventions implemented as appropriate)   04/17/19 1435   Coping/Psychosocial   Plan of Care Reviewed With patient   OTHER   Outcome Summary Patient is ambulating using walker and x1 assist. Vitals are stable and voiding function is intact. Pain is managed with po meds. Patient had x1 bm today. Dressing changed, steristrips applied to upper tape portion of zipline closure device d/t loosening. Patient educated on bp monitoring, med management. Anticipates d/c home with hh tomorrow.    Plan of Care Review   Progress improving       Problem: Fall Risk (Adult)  Goal: Absence of Fall  Outcome: Ongoing (interventions implemented as appropriate)   04/17/19 1435   Fall Risk (Adult)   Absence of Fall achieves outcome       Problem: Hip Arthroplasty (Total, Partial) (Adult)  Goal: Signs and Symptoms of Listed Potential Problems Will be Absent, Minimized or Managed (Hip Arthroplasty)  Outcome: Outcome(s) achieved Date Met: 04/17/19 04/17/19 1435   Goal/Outcome Evaluation   Problems Assessed (Hip Arthroplasty) all   Problems Present (Hip Arthroplasty) functional deficit;pain

## 2019-04-17 NOTE — THERAPY TREATMENT NOTE
Acute Care - Physical Therapy Treatment Note  [facility]     Patient Name: Maryjo Wynn  : 1945  MRN: [id number]  Today's Date: 2019  Onset of Illness/Injury or Date of Surgery: 04/15/19  Date of Referral to PT: 04/15/19  Referring Physician: Josh Haywood    Admit Date: 4/15/2019    Visit Dx:    ICD-10-CM ICD-9-CM   1. Difficulty walking R26.2 719.7     Patient Active Problem List   Diagnosis   • Degenerative joint disease (DJD) of hip       Therapy Treatment    Rehabilitation Treatment Summary     Row Name 19 1424 19 1112          Treatment Time/Intention    Discipline  physical therapist  -MS  physical therapist  -MS     Document Type  therapy note (daily note)  -MS  therapy note (daily note)  -MS     Subjective Information  complains of;pain;fatigue  -MS  complains of;fatigue;pain  -MS     Mode of Treatment  physical therapy  -MS  physical therapy  -MS     Patient Effort  good  -MS  good  -MS     Existing Precautions/Restrictions  oxygen therapy device and L/min;hip, posterior  (Significant)  LLE  -MS  oxygen therapy device and L/min;hip, posterior  (Significant)  LLE  -MS     Recorded by [MS] Williams Kan, PT 19 1426 [MS] Williams Kan, PT 19 1113     Row Name 19 1424 19 1112          Mobility Assessment/Intervention    Left Lower Extremity (Weight-bearing Status)  weight-bearing as tolerated (WBAT)  -MS  weight-bearing as tolerated (WBAT)  -MS     Recorded by [MS] Williams Kan, PT 19 1426 [MS] Williams Kan, PT 19 1113     Row Name 19 1424 19 1112          Bed Mobility Assessment/Treatment    Comment (Bed Mobility)  Up in chair  -MS  Up in chair  -MS     Recorded by [MS] Williams Kan, PT 19 1426 [MS] Williams Kan, PT 19 1113     Row Name 19 1424 19 1112          Sit-Stand Transfer    Sit-Stand [place] (Transfers)  contact guard  -MS  contact guard  -MS     Assistive Device  (Sit-Stand Transfers)  walker, front-wheeled  -MS  walker, front-wheeled  -MS     Recorded by [MS] Williams Kan, PT 04/17/19 1426 [MS] Williams Kan, PT 04/17/19 1113     Row Name 04/17/19 1424 04/17/19 1112          Stand-Sit Transfer    Stand-Sit [place] (Transfers)  contact guard  -MS  contact guard  -MS     Assistive Device (Stand-Sit Transfers)  walker, front-wheeled  -MS  walker, front-wheeled  -MS     Recorded by [MS] Williams Kan, PT 04/17/19 1426 [MS] Williams Kan, PT 04/17/19 1113     Row Name 04/17/19 1424 04/17/19 1112          Gait/Stairs Assessment/Training    [place] Level (Gait)  contact guard  -MS  contact guard  -MS     Assistive Device (Gait)  walker, front-wheeled  -MS  walker, front-wheeled  -MS     Distance in Feet (Gait)  30 feet  -MS  15 feet  -MS     Pattern (Gait)  step-to  -MS  step-to  -MS     Deviations/Abnormal Patterns (Gait)  antalgic  -MS  antalgic;stride length decreased  -MS     Bilateral Gait Deviations  forward flexed posture  -MS  --     Comment (Gait/Stairs)  Verbal/tactile cues for posture correction.  -MS  --     Recorded by [MS] Williams Kan, PT 04/17/19 1426 [MS] Williams Kan, PT 04/17/19 1113     Row Name 04/17/19 1424 04/17/19 1112          Therapeutic Exercise    Comment (Therapeutic Exercise)  Left THR protocol x 30 reps completed  -MS  Left THR protocol x 25 reps completed  -MS     Recorded by [MS] Williams Kan, PT 04/17/19 1426 [MS] Williams Kan, PT 04/17/19 1113     Row Name 04/17/19 1424 04/17/19 1112          Positioning and Restraints    Pre-Treatment Position  sitting in chair/recliner  -MS  sitting in chair/recliner  -MS     Post Treatment Position  chair  -MS  chair  -MS     In Chair  notified nsg;reclined;sitting;call light within reach;encouraged to call for assist;with family/caregiver  -MS  notified nsg;reclined;sitting;call light within reach;encouraged to call for assist  -MS     Recorded by [MS] Loreta  Williams MCGEE, PT 04/17/19 1426 [MS] Williams Kan, PT 04/17/19 1113     Row Name 04/17/19 1424 04/17/19 1112          Pain Scale: Numbers Pre/Post-Treatment    Pain Scale: Numbers, Pretreatment  4/10  -MS  4/10  -MS     Pain Scale: Numbers, Post-Treatment  4/10  -MS  4/10  -MS     Pain Location - Side  Left  -MS  Left  -MS     Pain Location  hip  -MS  hip  -MS     Pain Intervention(s)  Medication (See MAR);Cold applied;Repositioned;Elevated;Rest  -MS  Medication (See MAR);Cold applied;Repositioned;Elevated;Rest  -MS     Recorded by [MS] Williams Kan, PT 04/17/19 1426 [MS] Williams Kan, PT 04/17/19 1113     Row Name                Wound 04/15/19 0901 Left hip incision    Wound - Properties Group Date first assessed: 04/15/19 [SB] Time first assessed: 0901 [SB] Side: Left [SB] Location: hip [SB] Type: incision [SB] Recorded by:  [SB] Jannet Braxton, BONNIE 04/15/19 0901      User Key  (r) = Recorded By, (t) = Taken By, (c) = Cosigned By    Initials Name Effective Dates Discipline    Williams Samayoa, PT 04/03/18 -  PT    SB Jannet Braxton, RN 06/16/16 -  Nurse          Wound 04/15/19 0901 Left hip incision (Active)   Dressing Appearance moist drainage;intact 4/17/2019 12:49 PM   Closure Other (Comment);Approximated 4/17/2019  8:22 AM   Base bleeding 4/17/2019  8:22 AM   Periwound Temperature warm 4/17/2019  8:22 AM   Periwound Skin Turgor soft 4/17/2019  8:22 AM   Drainage Characteristics/Odor sanguineous 4/17/2019 12:49 PM   Drainage Amount small 4/17/2019 12:49 PM   Dressing Care, Wound dressing changed 4/17/2019  8:22 AM           Physical Therapy Education     Title: PT OT SLP Therapies (Done)     Topic: Physical Therapy (Done)     Point: Mobility training (Done)     Learning Progress Summary           Patient Acceptance, E,D, VU,NR by MS at 4/17/2019  2:26 PM    Acceptance, ANNA MEDEIROS VUNR by MS at 4/17/2019 11:14 AM    AcceptanceWALDEMAR D, VU, DU by  at 4/16/2019 11:47 AM    AcceptanceWALDEMAR D,  VU,NR by MS at 4/15/2019  1:48 PM                   Point: Home exercise program (Done)     Learning Progress Summary           Patient Acceptance, E,D, VU,NR by MS at 4/17/2019  2:26 PM    Acceptance, E,D, VU,NR by MS at 4/17/2019 11:14 AM    Acceptance, E,D, VU,DU by  at 4/16/2019 11:47 AM    Acceptance, E,D, VU,NR by MS at 4/15/2019  1:48 PM                   Point: Body mechanics (Done)     Learning Progress Summary           Patient Acceptance, E,D, VU,NR by MS at 4/17/2019  2:26 PM    Acceptance, E,D, VU,NR by MS at 4/17/2019 11:14 AM    Acceptance, E,D, VU,DU by  at 4/16/2019 11:47 AM    Acceptance, E,D, VU,NR by MS at 4/15/2019  1:48 PM                   Point: Precautions (Done)     Learning Progress Summary           Patient Acceptance, E,D, VU,NR by MS at 4/17/2019  2:26 PM    Acceptance, E,D, VU,NR by MS at 4/17/2019 11:14 AM    Acceptance, E,D, VU,DU by  at 4/16/2019 11:47 AM    Acceptance, E,D, VU,NR by MS at 4/15/2019  1:48 PM                               User Key     Initials Effective Dates Name Provider Type Discipline    MS 04/03/18 -  Williams Kan, PT Physical Therapist PT     08/19/18 -  Shirley Yee PTA Physical Therapy Assistant PT                PT Recommendation and Plan  Anticipated Discharge Disposition (PT): home with assist, home with home health  Planned Therapy Interventions (PT Eval): balance training, bed mobility training, gait training, home exercise program, patient/family education, postural re-education, stair training, strengthening, transfer training  Therapy Frequency (PT Clinical Impression): 2 times/day  Outcome Summary/Treatment Plan (PT)  Anticipated Discharge Disposition (PT): home with assist, home with home health  Plan of Care Reviewed With: patient  Progress: improving  Outcome Summary: Improved tolerance to functional activity this PM with an increase in gait distance and progression of ther. ex. protocol.    Outcome Measures     Row Name 04/17/19  1400 04/17/19 1100 04/16/19 1100       How much help from another person do you currently need...    Turning from your back to your side while in flat bed without using bedrails?  4  -MS  3  -MS  4  -EH    Moving from lying on back to sitting on the side of a flat bed without bedrails?  3  -MS  3  -MS  3  -EH    Moving to and from a bed to a chair (including a wheelchair)?  3  -MS  3  -MS  3  -EH    Standing up from a chair using your arms (e.g., wheelchair, bedside chair)?  3  -MS  3  -MS  3  -EH    Climbing 3-5 steps with a railing?  2  -MS  2  -MS  2  -EH    To walk in hospital room?  3  -MS  3  -MS  3  -EH    AM-PAC 6 Clicks Score  18  -MS  17  -MS  18  -EH       Functional Assessment    Outcome Measure Options  AM-PAC 6 Clicks Basic Mobility (PT)  -MS  AM-PAC 6 Clicks Basic Mobility (PT)  -MS  --    Row Name 04/15/19 1300             How much help from another person do you currently need...    Turning from your back to your side while in flat bed without using bedrails?  4  -MS      Moving from lying on back to sitting on the side of a flat bed without bedrails?  3  -MS      Moving to and from a bed to a chair (including a wheelchair)?  3  -MS      Standing up from a chair using your arms (e.g., wheelchair, bedside chair)?  2  -MS      Climbing 3-5 steps with a railing?  2  -MS      To walk in hospital room?  3  -MS      AM-PAC 6 Clicks Score  17  -MS         Functional Assessment    Outcome Measure Options  AM-PAC 6 Clicks Basic Mobility (PT)  -MS        User Key  (r) = Recorded By, (t) = Taken By, (c) = Cosigned By    Initials Name Provider Type    MS Williams Kan, PT Physical Therapist     Shirley Yee PTA Physical Therapy Assistant         Time Calculation:   PT Charges     Row Name 04/17/19 1427 04/17/19 1114          Time Calculation    Start Time  1323  -MS  0842  -MS     Stop Time  1342  -MS  0900  -MS     Time Calculation (min)  19 min  -MS  18 min  -MS     PT Received On  04/17/19  -MS   04/17/19  -MS     PT - Next Appointment  04/18/19  -MS  04/17/19  -MS        Time Calculation- PT    Total Timed Code Minutes- PT  14 minute(s)  -MS  14 minute(s)  -MS       User Key  (r) = Recorded By, (t) = Taken By, (c) = Cosigned By    Initials Name Provider Type    Williams Samayoa, PT Physical Therapist        Therapy Charges for Today     Code Description Service Date Service Provider Modifiers Qty    59521989194 HC PT THER PROC EA 15 MIN 4/17/2019 Williams Kan, PT GP 1    11918908006 HC PT THER PROC GROUP 4/17/2019 Williams Kan, PT GP 1    13548878559 HC PT THER PROC EA 15 MIN 4/17/2019 Williams Kan, PT GP 1    95194662371 HC PT THER PROC GROUP 4/17/2019 Williams Kan, PT GP 1          PT G-Codes  Outcome Measure Options: AM-PAC 6 Clicks Basic Mobility (PT)  AM-PAC 6 Clicks Score: 18    Williams Kan, PT  4/17/2019

## 2019-04-17 NOTE — PLAN OF CARE
Problem: Patient Care Overview  Goal: Plan of Care Review   04/17/19 1426   Coping/Psychosocial   Plan of Care Reviewed With patient   OTHER   Outcome Summary Improved tolerance to functional activity this PM with an increase in gait distance and progression of ther. ex. protocol.    Plan of Care Review   Progress improving

## 2019-04-17 NOTE — PLAN OF CARE
Problem: Patient Care Overview  Goal: Plan of Care Review  Outcome: Ongoing (interventions implemented as appropriate)   04/17/19 0102   Coping/Psychosocial   Plan of Care Reviewed With patient   OTHER   Outcome Summary Pt POD 2 from left total hip. WBAT and VSS at this time. Pt voiding without difficulty and pain well controlled at this time. Pt recieved 2 units PRBC's 4/16/19. Pt educated on importance of BP monitoring. Will continue to monitor. Pt plans to d/c home with home health when stable, possibly Thursday.    Plan of Care Review   Progress improving     Goal: Individualization and Mutuality  Outcome: Ongoing (interventions implemented as appropriate)    Goal: Discharge Needs Assessment  Outcome: Ongoing (interventions implemented as appropriate)    Goal: Interprofessional Rounds/Family Conf  Outcome: Ongoing (interventions implemented as appropriate)      Problem: Fall Risk (Adult)  Goal: Absence of Fall  Outcome: Ongoing (interventions implemented as appropriate)      Problem: Hip Arthroplasty (Total, Partial) (Adult)  Goal: Signs and Symptoms of Listed Potential Problems Will be Absent, Minimized or Managed (Hip Arthroplasty)  Outcome: Ongoing (interventions implemented as appropriate)

## 2019-04-17 NOTE — THERAPY TREATMENT NOTE
Acute Care - Physical Therapy Treatment Note  [facility]     Patient Name: Maryjo Wynn  : 1945  MRN: [id number]  Today's Date: 2019  Onset of Illness/Injury or Date of Surgery: 04/15/19  Date of Referral to PT: 04/15/19  Referring Physician: oJsh Haywood    Admit Date: 4/15/2019    Visit Dx:    ICD-10-CM ICD-9-CM   1. Difficulty walking R26.2 719.7     Patient Active Problem List   Diagnosis   • Degenerative joint disease (DJD) of hip       Therapy Treatment    Rehabilitation Treatment Summary     Row Name 19 1112             Treatment Time/Intention    Discipline  physical therapist  -MS      Document Type  therapy note (daily note)  -MS      Subjective Information  complains of;fatigue;pain  -MS      Mode of Treatment  physical therapy  -MS      Patient Effort  good  -MS      Existing Precautions/Restrictions  oxygen therapy device and L/min;hip, posterior  (Significant)  LLE  -MS      Recorded by [MS] Williams Kan, PT 19 1113      Row Name 19 1112             Mobility Assessment/Intervention    Left Lower Extremity (Weight-bearing Status)  weight-bearing as tolerated (WBAT)  -MS      Recorded by [MS] Williams Kan, PT 19 1113      Row Name 19 1112             Bed Mobility Assessment/Treatment    Comment (Bed Mobility)  Up in chair  -MS      Recorded by [MS] Williams Kan, PT 19 1113      Row Name 19 1112             Sit-Stand Transfer    Sit-Stand [place] (Transfers)  contact guard  -MS      Assistive Device (Sit-Stand Transfers)  walker, front-wheeled  -MS      Recorded by [MS] Williams Kan, PT 19 1113      Row Name 19 1112             Stand-Sit Transfer    Stand-Sit [place] (Transfers)  contact guard  -MS      Assistive Device (Stand-Sit Transfers)  walker, front-wheeled  -MS      Recorded by [MS] Williams Kan, PT 19 1113      Row Name 19 1112             Gait/Stairs Assessment/Training     [place] Level (Gait)  contact guard  -MS      Assistive Device (Gait)  walker, front-wheeled  -MS      Distance in Feet (Gait)  15 feet  -MS      Pattern (Gait)  step-to  -MS      Deviations/Abnormal Patterns (Gait)  antalgic;stride length decreased  -MS      Recorded by [MS] Loreta Williams MCGEE, PT 04/17/19 1113      Row Name 04/17/19 1112             Therapeutic Exercise    Comment (Therapeutic Exercise)  Left THR protocol x 25 reps completed  -MS      Recorded by [MS] Williams Kan, PT 04/17/19 1113      Row Name 04/17/19 1112             Positioning and Restraints    Pre-Treatment Position  sitting in chair/recliner  -MS      Post Treatment Position  chair  -MS      In Chair  notified nsg;reclined;sitting;call light within reach;encouraged to call for assist  -MS      Recorded by [MS] Williams Kan, PT 04/17/19 1113      Row Name 04/17/19 1112             Pain Scale: Numbers Pre/Post-Treatment    Pain Scale: Numbers, Pretreatment  4/10  -MS      Pain Scale: Numbers, Post-Treatment  4/10  -MS      Pain Location - Side  Left  -MS      Pain Location  hip  -MS      Pain Intervention(s)  Medication (See MAR);Cold applied;Repositioned;Elevated;Rest  -MS      Recorded by [MS] Williams Kan, PT 04/17/19 1113      Row Name                Wound 04/15/19 0901 Left hip incision    Wound - Properties Group Date first assessed: 04/15/19 [SB] Time first assessed: 0901 [SB] Side: Left [SB] Location: hip [SB] Type: incision [SB] Recorded by:  [SB] Jannet Braxton RN 04/15/19 0901      User Key  (r) = Recorded By, (t) = Taken By, (c) = Cosigned By    Initials Name Effective Dates Discipline    MS Williams Kan, PT 04/03/18 -  PT    Jannet Dyson, RN 06/16/16 -  Nurse          Wound 04/15/19 0901 Left hip incision (Active)   Dressing Appearance dry;intact;no drainage 4/17/2019  4:00 AM   Closure DRAGAN 4/17/2019  4:00 AM   Base dressing in place, unable to visualize 4/17/2019  4:00 AM   Drainage  Characteristics/Odor sanguineous 4/16/2019  6:39 PM   Drainage Amount none 4/17/2019  4:00 AM   Dressing Care, Wound border dressing 4/16/2019 12:05 PM           Physical Therapy Education     Title: PT OT SLP Therapies (Done)     Topic: Physical Therapy (Done)     Point: Mobility training (Done)     Learning Progress Summary           Patient Acceptance, E,D, VU,NR by MS at 4/17/2019 11:14 AM    Acceptance, E,D, VU,DU by  at 4/16/2019 11:47 AM    Acceptance, E,D, VU,NR by MS at 4/15/2019  1:48 PM                   Point: Home exercise program (Done)     Learning Progress Summary           Patient Acceptance, E,D, VU,NR by MS at 4/17/2019 11:14 AM    Acceptance, E,D, VU,DU by  at 4/16/2019 11:47 AM    Acceptance, E,D, VU,NR by MS at 4/15/2019  1:48 PM                   Point: Body mechanics (Done)     Learning Progress Summary           Patient Acceptance, E,D, VU,NR by MS at 4/17/2019 11:14 AM    Acceptance, E,D, VU,DU by  at 4/16/2019 11:47 AM    Acceptance, E,D, VU,NR by MS at 4/15/2019  1:48 PM                   Point: Precautions (Done)     Learning Progress Summary           Patient Acceptance, E,D, VU,NR by MS at 4/17/2019 11:14 AM    Acceptance, E,D, VU,DU by  at 4/16/2019 11:47 AM    Acceptance, E,D, VU,NR by MS at 4/15/2019  1:48 PM                               User Key     Initials Effective Dates Name Provider Type Discipline    MS 04/03/18 -  Williams Kan, PT Physical Therapist PT     08/19/18 -  Shirley Yee PTA Physical Therapy Assistant PT                PT Recommendation and Plan  Anticipated Discharge Disposition (PT): home with assist, home with home health  Planned Therapy Interventions (PT Eval): balance training, bed mobility training, gait training, home exercise program, patient/family education, postural re-education, stair training, strengthening, transfer training  Therapy Frequency (PT Clinical Impression): 2 times/day  Outcome Summary/Treatment Plan (PT)  Anticipated  Discharge Disposition (PT): home with assist, home with home health  Plan of Care Reviewed With: patient  Progress: improving  Outcome Summary: Improved tolerance to functional activity this day with an increase in gait distance and progression of ther. ex. protocol.   Outcome Measures     Row Name 04/17/19 1100 04/16/19 1100 04/15/19 1300       How much help from another person do you currently need...    Turning from your back to your side while in flat bed without using bedrails?  3  -MS  4  -EH  4  -MS    Moving from lying on back to sitting on the side of a flat bed without bedrails?  3  -MS  3  -EH  3  -MS    Moving to and from a bed to a chair (including a wheelchair)?  3  -MS  3  -EH  3  -MS    Standing up from a chair using your arms (e.g., wheelchair, bedside chair)?  3  -MS  3  -EH  2  -MS    Climbing 3-5 steps with a railing?  2  -MS  2  -EH  2  -MS    To walk in hospital room?  3  -MS  3  -EH  3  -MS    AM-PAC 6 Clicks Score  17  -MS  18  -EH  17  -MS       Functional Assessment    Outcome Measure Options  AM-PAC 6 Clicks Basic Mobility (PT)  -MS  --  AM-PAC 6 Clicks Basic Mobility (PT)  -MS      User Key  (r) = Recorded By, (t) = Taken By, (c) = Cosigned By    Initials Name Provider Type    Williams Samayoa, PT Physical Therapist     Shirley Yee PTA Physical Therapy Assistant         Time Calculation:   PT Charges     Row Name 04/17/19 1114             Time Calculation    Start Time  0842  -MS      Stop Time  0900  -MS      Time Calculation (min)  18 min  -MS      PT Received On  04/17/19  -MS      PT - Next Appointment  04/17/19  -MS         Time Calculation- PT    Total Timed Code Minutes- PT  14 minute(s)  -MS        User Key  (r) = Recorded By, (t) = Taken By, (c) = Cosigned By    Initials Name Provider Type    Williams Samayoa, PT Physical Therapist        Therapy Charges for Today     Code Description Service Date Service Provider Modifiers Qty    67301598809  PT THER PROC EA 15  MIN 4/17/2019 Williams Kan, PT GP 1    93598473151 HC PT THER PROC GROUP 4/17/2019 Williams Kan, PT GP 1          PT G-Codes  Outcome Measure Options: AM-PAC 6 Clicks Basic Mobility (PT)  AM-PAC 6 Clicks Score: 17    Williams Kan, PT  4/17/2019

## 2019-04-17 NOTE — PLAN OF CARE
Problem: Patient Care Overview  Goal: Plan of Care Review  Outcome: Ongoing (interventions implemented as appropriate)   04/16/19 1850   Coping/Psychosocial   Plan of Care Reviewed With patient   OTHER   Outcome Summary Patient is ambulating using walker and x1 assist. Vitals are stable and voiding function is intact. Pain is controlled with po meds. Patient with low hgb this am, 2 units of PRBCs ordered, first unit given without issue and second unit currently transfusing. K+ low also, K+ protocol started and was effective. Patient educated on bp monitoring and importance of lasix r/t heart and fluid status. Patient anticipates d/c home with hh when stable, per MD note potentially Thursday. Will continue to monitor.    Plan of Care Review   Progress improving       Problem: Fall Risk (Adult)  Goal: Absence of Fall  Outcome: Ongoing (interventions implemented as appropriate)   04/16/19 1850   Fall Risk (Adult)   Absence of Fall achieves outcome       Problem: Hip Arthroplasty (Total, Partial) (Adult)  Goal: Signs and Symptoms of Listed Potential Problems Will be Absent, Minimized or Managed (Hip Arthroplasty)  Outcome: Ongoing (interventions implemented as appropriate)   04/16/19 1850   Goal/Outcome Evaluation   Problems Assessed (Hip Arthroplasty) all   Problems Present (Hip Arthroplasty) bleeding/anemia;functional deficit;pain;situational response     Goal: Anesthesia/Sedation Recovery  Outcome: Outcome(s) achieved Date Met: 04/16/19 04/16/19 1850   Goal/Outcome Evaluation   Anesthesia/Sedation Recovery recovered to baseline

## 2019-04-17 NOTE — PLAN OF CARE
Problem: Patient Care Overview  Goal: Plan of Care Review   04/17/19 1114   Coping/Psychosocial   Plan of Care Reviewed With patient   OTHER   Outcome Summary Improved tolerance to functional activity this day with an increase in gait distance and progression of ther. ex. protocol.    Plan of Care Review   Progress improving

## 2019-04-18 NOTE — PROGRESS NOTES
"  Orthopaedic Surgery   Daily Progress Note  Dr. Josh Ramírez Sr  [phone]  DEMOGRAPHICS:   · Maryjo Wynn   · Age:74 y.o.   · MRN:[id number]  · Admitted: 4/15/2019    PROCEDURE: 3 Days Post-Op s/p Procedure(s):  LEFT TOTAL HIP ARTHROPLASTY     /69 (BP Location: Left arm, Patient Position: Lying)   Pulse 77   Temp 98 °F (36.7 °C) (Oral)   Resp 16   Ht 152.4 cm (60\")   Wt 49 kg (108 lb)   SpO2 97%   BMI 21.09 kg/m²     Lab Results (last 24 hours)     Procedure Component Value Units Date/Time    Basic Metabolic Panel [[id number]]  (Abnormal) Collected:  04/18/19 0455    Specimen:  Blood from Arm, Left Updated:  04/18/19 0635     Glucose 98 mg/dL      BUN 4 mg/dL      Creatinine 0.23 mg/dL      Sodium 144 mmol/L      Potassium 3.2 mmol/L      Chloride 92 mmol/L      CO2 44.6 mmol/L      Calcium 8.4 mg/dL      eGFR Non African Amer >150 mL/min/1.73      BUN/Creatinine Ratio 17.4     Anion Gap 7.4 mmol/L     Narrative:       GFR Normal >60  Chronic Kidney Disease <60  Kidney Failure <15    CBC & Differential [[id number]] Collected:  04/18/19 0455    Specimen:  Blood Updated:  04/18/19 0610    Narrative:       The following orders were created for panel order CBC & Differential.  Procedure                               Abnormality         Status                     ---------                               -----------         ------                     CBC Auto Differential[[id number]]        Abnormal            Final result                 Please view results for these tests on the individual orders.    CBC Auto Differential [[id number]]  (Abnormal) Collected:  04/18/19 0455    Specimen:  Blood from Arm, Left Updated:  04/18/19 0610     WBC 11.49 10*3/mm3      RBC 3.30 10*6/mm3      Hemoglobin 10.1 g/dL      Hematocrit 34.3 %      .9 fL      MCH 30.6 pg      MCHC 29.4 g/dL      RDW 17.4 %      RDW-SD 67.3 fl      MPV 12.0 fL      Platelets 191 10*3/mm3      Neutrophil % 78.1 %      Lymphocyte % " 12.4 %      Monocyte % 8.4 %      Eosinophil % 0.2 %      Basophil % 0.1 %      Immature Grans % 0.8 %      Neutrophils, Absolute 8.98 10*3/mm3      Lymphocytes, Absolute 1.43 10*3/mm3      Monocytes, Absolute 0.96 10*3/mm3      Eosinophils, Absolute 0.02 10*3/mm3      Basophils, Absolute 0.01 10*3/mm3      Immature Grans, Absolute 0.09 10*3/mm3      nRBC 0.1 /100 WBC           Imaging Results (last 24 hours)     ** No results found for the last 24 hours. **          Patient Care Team:  Rashi Ogden MD as PCP - General (Family Medicine)  Linda Conde MD as Consulting Physician (Obstetrics and Gynecology)  Frank Staton MD as Consulting Physician (Pulmonary Disease)  Ryan Diaz MD as Consulting Physician (Interventional Cardiology)    SUBJECTIVE  More comfortable now  Not independent yet    PHYSICAL EXAM  Wound looks good.  Minor drainage.     ASSESSMENT: Patient is a 74 y.o. female who is 3 Days Post-Op s/p Procedure(s):  LEFT TOTAL HIP ARTHROPLASTY   Acute blood loss anemia on top of chronic anemia.  She was transfused.  Current hemoglobin 10.1  Oxygen dependent COPD  History of GI bleed  Peripheral vascular disease  Coronary artery disease  She has very thin skin  Hypokalemia    PLAN / DISPOSITION:  Potassium replacement  Continue to mobilize.  Hopeful for discharge tomorrow.    Josh Ramírez Sr, MD  Orthopaedic Surgery  [facility]  [phone]

## 2019-04-18 NOTE — PLAN OF CARE
Problem: Patient Care Overview  Goal: Plan of Care Review   04/18/19 4244   Coping/Psychosocial   Plan of Care Reviewed With patient   OTHER   Outcome Summary Pain well controlled with PO pain medication. Ambulates with assist x1 and walker. VSS. Voiding without difficulty. Possible DC home tomorrow if doing well with home health. Educated about BP monitoring.    Plan of Care Review   Progress improving     Goal: Individualization and Mutuality  Outcome: Ongoing (interventions implemented as appropriate)    Goal: Discharge Needs Assessment  Outcome: Ongoing (interventions implemented as appropriate)    Goal: Interprofessional Rounds/Family Conf  Outcome: Ongoing (interventions implemented as appropriate)      Problem: Fall Risk (Adult)  Goal: Absence of Fall  Outcome: Ongoing (interventions implemented as appropriate)      Problem: Hip Arthroplasty (Total, Partial) (Adult)  Goal: Signs and Symptoms of Listed Potential Problems Will be Absent, Minimized or Managed (Hip Arthroplasty)  Outcome: Ongoing (interventions implemented as appropriate)

## 2019-04-18 NOTE — PLAN OF CARE
Problem: Patient Care Overview  Goal: Plan of Care Review  Outcome: Ongoing (interventions implemented as appropriate)   04/18/19 1152   Coping/Psychosocial   Plan of Care Reviewed With patient   OTHER   Outcome Summary pt tolerated treatment with no complaints. Pt able to perform THR protocol exercises with increased reps with minimal c/o difficulty. Pt ambulated 25 feet with rwx, CGA. Pt's ambulation distance limited due to pt feeling SOA, however, pt progressing well with PT.    Plan of Care Review   Progress improving

## 2019-04-18 NOTE — THERAPY TREATMENT NOTE
Acute Care - Physical Therapy Treatment Note  [facility]     Patient Name: Maryjo Wynn  : 1945  MRN: [id number]  Today's Date: 2019  Onset of Illness/Injury or Date of Surgery: 04/15/19  Date of Referral to PT: 04/15/19  Referring Physician: Josh Haywood    Admit Date: 4/15/2019    Visit Dx:    ICD-10-CM ICD-9-CM   1. Difficulty walking R26.2 719.7     Patient Active Problem List   Diagnosis   • Degenerative joint disease (DJD) of hip       Therapy Treatment    Rehabilitation Treatment Summary     Row Name 19 1347 19 1151          Treatment Time/Intention    Discipline  physical therapy assistant  -EH  physical therapy assistant  -     Document Type  therapy note (daily note)  -  therapy note (daily note)  -     Subjective Information  no complaints  -  no complaints  -     Mode of Treatment  physical therapy;group therapy  -  physical therapy;group therapy  -     Care Plan Review  patient/other agree to care plan  -  patient/other agree to care plan  -     Therapy Frequency (PT Clinical Impression)  2 times/day  -  2 times/day  -     Patient Effort  good  -EH  good  -EH     Existing Precautions/Restrictions  oxygen therapy device and L/min;hip, posterior LLE  -EH  oxygen therapy device and L/min;hip, posterior LLE  -EH     Recorded by [] Shirley Yee PTA 19 1348 [] Shirley Yee PTA 19 1152     Row Name 19 1347 19 1151          Cognitive Assessment/Intervention- PT/OT    Orientation Status (Cognition)  oriented x 4  -EH  oriented x 4  -EH     Follows Commands (Cognition)  WNL  -EH  WNL  -EH     Personal Safety Interventions  fall prevention program maintained;gait belt;nonskid shoes/slippers when out of bed  -EH  fall prevention program maintained;gait belt;nonskid shoes/slippers when out of bed  -EH     Recorded by [] Shirley Yee PTA 19 1348 [] Shirley Yee PTA 19 1152     Row Name 19 1347 19  1151          Mobility Assessment/Intervention    Left Lower Extremity (Weight-bearing Status)  weight-bearing as tolerated (WBAT)  -EH  weight-bearing as tolerated (WBAT)  -EH     Recorded by [EH] Shirley Yee, [facility] 04/18/19 1348 [EH] Shirley Yee, [facility] 04/18/19 1152     Row Name 04/18/19 1347 04/18/19 1151          Sit-Stand Transfer    Sit-Stand [place] (Transfers)  contact guard  -  contact guard  -     Assistive Device (Sit-Stand Transfers)  walker, front-wheeled  -EH  walker, front-wheeled  -EH     Recorded by [EH] Shirley Yee, [facility] 04/18/19 1348 [EH] Shirley Yee, [facility] 04/18/19 1152     Row Name 04/18/19 1347 04/18/19 1151          Stand-Sit Transfer    Stand-Sit [place] (Transfers)  contact guard  -  contact guard  -EH     Assistive Device (Stand-Sit Transfers)  walker, front-wheeled  -EH  walker, front-wheeled  -EH     Recorded by [EH] Shirley Yee, [facility] 04/18/19 1348 [EH] Shirley Yee, [facility] 04/18/19 1152     Row Name 04/18/19 1347 04/18/19 1151          Gait/Stairs Assessment/Training    [place] Level (Gait)  --  contact guard  -EH     Assistive Device (Gait)  --  walker, front-wheeled  -EH     Distance in Feet (Gait)  --  25  -EH     Pattern (Gait)  --  step-to  -EH     Deviations/Abnormal Patterns (Gait)  --  antalgic  -EH     Bilateral Gait Deviations  --  forward flexed posture  -EH     [place] Level (Stairs)  contact guard;minimum assist (75% patient effort)  -EH  --     Handrail Location (Stairs)  right side (ascending)  -EH  --     Number of Steps (Stairs)  3  -EH  --     Ascending Technique (Stairs)  step-to-step  -EH  --     Descending Technique (Stairs)  step-to-step  -EH  --     Comment (Gait/Stairs)  pt required [place] for descending stairs.   -  pt ambulation distance limited due to feeling SOA  -EH     Recorded by [EH] Shirley Yee, [facility] 04/18/19 1348 [] Shirley Yee, PTA 04/18/19 1152     Row Name 04/18/19 1347 04/18/19 1151          Therapeutic Exercise     Comment (Therapeutic Exercise)  Left THR protocol X30 reps  -EH  Left THR protocol X30 reps  -EH     Recorded by [] Shirley Yee, SHANNEN 04/18/19 1348 [] Shirley Yee, PTA 04/18/19 1152     Row Name 04/18/19 1347 04/18/19 1151          Positioning and Restraints    Pre-Treatment Position  sitting in chair/recliner  -EH  sitting in chair/recliner  -EH     Post Treatment Position  chair  -EH  chair  -EH     In Chair  reclined;call light within reach;encouraged to call for assist;with family/caregiver  -  reclined;call light within reach;encouraged to call for assist  -EH     Recorded by [] Shirley Yee, SHANNEN 04/18/19 1348 [] Shirley Yee PTA 04/18/19 1152     Row Name 04/18/19 1347 04/18/19 1151          Pain Scale: Numbers Pre/Post-Treatment    Pain Location - Side  Left  -EH  Left  -EH     Pain Location  hip  -EH  hip  -EH     Recorded by [] Shirley Yee PTA 04/18/19 1348 [] Shirley Yee, SHANNEN 04/18/19 1152     Row Name                Wound 04/15/19 0901 Left hip incision    Wound - Properties Group Date first assessed: 04/15/19 [SB] Time first assessed: 0901 [SB] Side: Left [SB] Location: hip [SB] Type: incision [SB] Recorded by:  [SB] Jannet Braxton RN 04/15/19 0901      User Key  (r) = Recorded By, (t) = Taken By, (c) = Cosigned By    Initials Name Effective Dates Discipline    SB Jannet Braxton RN 06/16/16 -  Nurse     Shirley Yee, PTA 08/19/18 -  PT          Wound 04/15/19 0901 Left hip incision (Active)   Dressing Appearance dried drainage;intact 4/18/2019 12:34 PM   Closure DRAGAN 4/18/2019 12:34 PM   Base dressing in place, unable to visualize 4/18/2019 12:34 PM   Drainage Characteristics/Odor sanguineous 4/17/2019  6:37 PM   Drainage Amount moderate 4/18/2019 12:34 PM   Dressing Care, Wound dressing changed;border dressing;gauze 4/17/2019  6:37 PM   Adhesive Closure Strips Applied 4/17/2019  6:37 PM   Number of Adhesive Closure Strips 3 4/17/2019  6:37 PM            Physical Therapy Education     Title: PT OT SLP Therapies (Done)     Topic: Physical Therapy (Done)     Point: Mobility training (Done)     Learning Progress Summary           Patient Acceptance, E,TB,D, VU,DU by  at 4/18/2019 11:50 AM    Acceptance, E,D, VU,NR by MS at 4/17/2019  2:26 PM    Acceptance, E,D, VU,NR by MS at 4/17/2019 11:14 AM    Acceptance, E,D, VU,DU by  at 4/16/2019 11:47 AM    Acceptance, E,D, VU,NR by MS at 4/15/2019  1:48 PM                   Point: Home exercise program (Done)     Learning Progress Summary           Patient Acceptance, E,TB,D, VU,DU by  at 4/18/2019 11:50 AM    Acceptance, E,D, VU,NR by MS at 4/17/2019  2:26 PM    Acceptance, E,D, VU,NR by MS at 4/17/2019 11:14 AM    Acceptance, E,D, VU,DU by  at 4/16/2019 11:47 AM    Acceptance, E,D, VU,NR by MS at 4/15/2019  1:48 PM                   Point: Body mechanics (Done)     Learning Progress Summary           Patient Acceptance, E,TB,D, VU,DU by  at 4/18/2019 11:50 AM    Acceptance, E,D, VU,NR by MS at 4/17/2019  2:26 PM    Acceptance, E,D, VU,NR by MS at 4/17/2019 11:14 AM    Acceptance, E,D, VU,DU by  at 4/16/2019 11:47 AM    Acceptance, E,D, VU,NR by MS at 4/15/2019  1:48 PM                   Point: Precautions (Done)     Learning Progress Summary           Patient Acceptance, E,TB,D, VU,DU by  at 4/18/2019 11:50 AM    Acceptance, E,D, VU,NR by MS at 4/17/2019  2:26 PM    Acceptance, E,D, VU,NR by MS at 4/17/2019 11:14 AM    Acceptance, E,D, VU,DU by  at 4/16/2019 11:47 AM    Acceptance, E,D, VU,NR by MS at 4/15/2019  1:48 PM                               User Key     Initials Effective Dates Name Provider Type Discipline    MS 04/03/18 -  Williams Kan, PT Physical Therapist PT     08/19/18 -  Shirley Yee, SHANNEN Physical Therapy Assistant PT                PT Recommendation and Plan  Anticipated Discharge Disposition (PT): home with assist, home with home health  Therapy Frequency (PT Clinical  Impression): 2 times/day  Outcome Summary/Treatment Plan (PT)  Anticipated Discharge Disposition (PT): home with assist, home with home health  Plan of Care Reviewed With: patient  Progress: improving  Outcome Summary: pt tolerated treatment with no complaints. Pt able to perform THR protocol exercises with increased reps with minimal c/o difficulty. Pt ambulated 25 feet with rwx, CGA. Pt's ambulation distance limited due to pt feeling SOA, however, pt progressing well with PT.   Outcome Measures     Row Name 04/18/19 1100 04/17/19 1400 04/17/19 1100       How much help from another person do you currently need...    Turning from your back to your side while in flat bed without using bedrails?  4  -EH  4  -MS  3  -MS    Moving from lying on back to sitting on the side of a flat bed without bedrails?  3  -EH  3  -MS  3  -MS    Moving to and from a bed to a chair (including a wheelchair)?  3  -EH  3  -MS  3  -MS    Standing up from a chair using your arms (e.g., wheelchair, bedside chair)?  3  -EH  3  -MS  3  -MS    Climbing 3-5 steps with a railing?  2  -EH  2  -MS  2  -MS    To walk in hospital room?  3  -EH  3  -MS  3  -MS    AM-PAC 6 Clicks Score  18  -EH  18  -MS  17  -MS       Functional Assessment    Outcome Measure Options  --  AM-PAC 6 Clicks Basic Mobility (PT)  -MS  AM-PAC 6 Clicks Basic Mobility (PT)  -MS    Row Name 04/16/19 1100             How much help from another person do you currently need...    Turning from your back to your side while in flat bed without using bedrails?  4  -EH      Moving from lying on back to sitting on the side of a flat bed without bedrails?  3  -EH      Moving to and from a bed to a chair (including a wheelchair)?  3  -EH      Standing up from a chair using your arms (e.g., wheelchair, bedside chair)?  3  -EH      Climbing 3-5 steps with a railing?  2  -EH      To walk in hospital room?  3  -EH      AM-PAC 6 Clicks Score  18  -EH        User Key  (r) = Recorded By, (t) =  Taken By, (c) = Cosigned By    Initials Name Provider Type    Williams Samayoa L, PT Physical Therapist     Shirley Yee PTA Physical Therapy Assistant         Time Calculation:   PT Charges     Row Name 04/18/19 1346 04/18/19 1150          Time Calculation    Start Time  1300  -EH  0835  -     Stop Time  1325  -EH  0914  -EH     Time Calculation (min)  25 min  -EH  39 min  -EH     PT Received On  04/18/19  -  04/18/19  -     PT - Next Appointment  04/19/19  -  04/18/19  -        Time Calculation- PT    Total Timed Code Minutes- PT  25 minute(s)  -EH  35 minute(s)  -       User Key  (r) = Recorded By, (t) = Taken By, (c) = Cosigned By    Initials Name Provider Type     Shirley Yee PTA Physical Therapy Assistant        Therapy Charges for Today     Code Description Service Date Service Provider Modifiers Qty    88970812546 HC PT THER PROC EA 15 MIN 4/18/2019 Shirley Yee PTA GP 1    74751638767 HC PT THER PROC GROUP 4/18/2019 Shirley Yee PTA GP 1    22965761043 HC PT THER PROC EA 15 MIN 4/18/2019 Shirley Yee PTA GP 1    02829006272 HC PT THER PROC GROUP 4/18/2019 Shirley Yee PTA GP 1          PT G-Codes  Outcome Measure Options: AM-PAC 6 Clicks Basic Mobility (PT)  AM-PAC 6 Clicks Score: 18    Shirley Yee PTA  4/18/2019

## 2019-04-18 NOTE — THERAPY TREATMENT NOTE
Acute Care - Physical Therapy Treatment Note  [facility]     Patient Name: Maryjo Wynn  : 1945  MRN: [id number]  Today's Date: 2019  Onset of Illness/Injury or Date of Surgery: 04/15/19  Date of Referral to PT: 04/15/19  Referring Physician: Josh Haywood    Admit Date: 4/15/2019    Visit Dx:    ICD-10-CM ICD-9-CM   1. Difficulty walking R26.2 719.7     Patient Active Problem List   Diagnosis   • Degenerative joint disease (DJD) of hip       Therapy Treatment    Rehabilitation Treatment Summary     Row Name 19 1151             Treatment Time/Intention    Discipline  physical therapy assistant  -      Document Type  therapy note (daily note)  -      Subjective Information  no complaints  -      Mode of Treatment  physical therapy;group therapy  -      Care Plan Review  patient/other agree to care plan  -      Therapy Frequency (PT Clinical Impression)  2 times/day  -      Patient Effort  good  -      Existing Precautions/Restrictions  oxygen therapy device and L/min;hip, posterior LLE  -      Recorded by [] Shirley Yee PTA 19 1152      Row Name 19 1151             Cognitive Assessment/Intervention- PT/OT    Orientation Status (Cognition)  oriented x 4  -      Follows Commands (Cognition)  WNL  -      Personal Safety Interventions  fall prevention program maintained;gait belt;nonskid shoes/slippers when out of bed  -EH      Recorded by [] Shirley Yee PTA 19 1152      Row Name 19 1151             Mobility Assessment/Intervention    Left Lower Extremity (Weight-bearing Status)  weight-bearing as tolerated (WBAT)  -      Recorded by [] Shirley Yee PTA 19 1152      Row Name 19 1151             Sit-Stand Transfer    Sit-Stand [place] (Transfers)  contact guard  -      Assistive Device (Sit-Stand Transfers)  walker, front-wheeled  -EH      Recorded by [] Shirley Yee PTA 19 1152      Row Name 19 1151              Stand-Sit Transfer    Stand-Sit [place] (Transfers)  contact guard  -EH      Assistive Device (Stand-Sit Transfers)  walker, front-wheeled  -EH      Recorded by [] Shirley Yee PTA 04/18/19 1152      Row Name 04/18/19 1151             Gait/Stairs Assessment/Training    [place] Level (Gait)  contact guard  -EH      Assistive Device (Gait)  walker, front-wheeled  -EH      Distance in Feet (Gait)  25  -EH      Pattern (Gait)  step-to  -EH      Deviations/Abnormal Patterns (Gait)  antalgic  -EH      Bilateral Gait Deviations  forward flexed posture  -EH      Comment (Gait/Stairs)  pt ambulation distance limited due to feeling SOA  -EH      Recorded by [] Shirley Yee PTA 04/18/19 1152      Row Name 04/18/19 1151             Therapeutic Exercise    Comment (Therapeutic Exercise)  Left THR protocol X30 reps  -EH      Recorded by [] Shirley Yee PTA 04/18/19 1152      Row Name 04/18/19 1151             Positioning and Restraints    Pre-Treatment Position  sitting in chair/recliner  -EH      Post Treatment Position  chair  -EH      In Chair  reclined;call light within reach;encouraged to call for assist  -EH      Recorded by [] Shirley Yee PTA 04/18/19 1152      Row Name 04/18/19 1151             Pain Scale: Numbers Pre/Post-Treatment    Pain Location - Side  Left  -EH      Pain Location  hip  -EH      Recorded by [] Shirley Yee PTA 04/18/19 1152      Row Name                Wound 04/15/19 0901 Left hip incision    Wound - Properties Group Date first assessed: 04/15/19 [SB] Time first assessed: 0901 [SB] Side: Left [SB] Location: hip [SB] Type: incision [SB] Recorded by:  [SB] Jannet Braxton RN 04/15/19 0901      User Key  (r) = Recorded By, (t) = Taken By, (c) = Cosigned By    Initials Name Effective Dates Discipline    SB Jannet Braxton RN 06/16/16 -  Nurse    EH Shirley Yee PTA 08/19/18 -  PT          Wound 04/15/19 0901 Left hip incision (Active)    Dressing Appearance dried drainage;intact 4/18/2019  8:09 AM   Closure DRAGAN 4/18/2019  8:09 AM   Base dressing in place, unable to visualize 4/18/2019  8:09 AM   Drainage Characteristics/Odor sanguineous 4/17/2019  6:37 PM   Drainage Amount moderate 4/18/2019  8:09 AM   Dressing Care, Wound dressing changed;border dressing;gauze 4/17/2019  6:37 PM   Adhesive Closure Strips Applied 4/17/2019  6:37 PM   Number of Adhesive Closure Strips 3 4/17/2019  6:37 PM           Physical Therapy Education     Title: PT OT SLP Therapies (Done)     Topic: Physical Therapy (Done)     Point: Mobility training (Done)     Learning Progress Summary           Patient Acceptance, E,TB,D, VU,DU by  at 4/18/2019 11:50 AM    Acceptance, E,D, VU,NR by MS at 4/17/2019  2:26 PM    Acceptance, E,D, VU,NR by MS at 4/17/2019 11:14 AM    Acceptance, E,D, VU,DU by  at 4/16/2019 11:47 AM    Acceptance, E,D, VU,NR by MS at 4/15/2019  1:48 PM                   Point: Home exercise program (Done)     Learning Progress Summary           Patient Acceptance, E,TB,D, VU,DU by  at 4/18/2019 11:50 AM    Acceptance, E,D, VU,NR by MS at 4/17/2019  2:26 PM    Acceptance, E,D, VU,NR by MS at 4/17/2019 11:14 AM    Acceptance, E,D, VU,DU by  at 4/16/2019 11:47 AM    Acceptance, E,D, VU,NR by MS at 4/15/2019  1:48 PM                   Point: Body mechanics (Done)     Learning Progress Summary           Patient Acceptance, E,TB,D, VU,DU by  at 4/18/2019 11:50 AM    Acceptance, E,D, VU,NR by MS at 4/17/2019  2:26 PM    Acceptance, E,D, VU,NR by MS at 4/17/2019 11:14 AM    Acceptance, E,D, VU,DU by  at 4/16/2019 11:47 AM    Acceptance, E,D, VU,NR by MS at 4/15/2019  1:48 PM                   Point: Precautions (Done)     Learning Progress Summary           Patient Acceptance, E,TB,D, VU,DU by  at 4/18/2019 11:50 AM    Acceptance, E,D, VU,NR by MS at 4/17/2019  2:26 PM    Acceptance, E,D, VU,NR by MS at 4/17/2019 11:14 AM    Acceptance, E,D, VU,DU by   at 4/16/2019 11:47 AM    Vashti, ANNA MEDEIROS, [place],NR by MS at 4/15/2019  1:48 PM                               User Key     Initials Effective Dates Name Provider Type Discipline    MS 04/03/18 -  Williams Kan, PT Physical Therapist PT     08/19/18 -  Shirley Yee PTA Physical Therapy Assistant PT                PT Recommendation and Plan  Anticipated Discharge Disposition (PT): home with assist, home with home health  Therapy Frequency (PT Clinical Impression): 2 times/day  Outcome Summary/Treatment Plan (PT)  Anticipated Discharge Disposition (PT): home with assist, home with home health  Plan of Care Reviewed With: patient  Progress: improving  Outcome Summary: pt tolerated treatment with no complaints. Pt able to perform THR protocol exercises with increased reps with minimal c/o difficulty. Pt ambulated 25 feet with rwx, CGA. Pt's ambulation distance limited due to pt feeling SOA, however, pt progressing well with PT.   Outcome Measures     Row Name 04/18/19 1100 04/17/19 1400 04/17/19 1100       How much help from another person do you currently need...    Turning from your back to your side while in flat bed without using bedrails?  4  -EH  4  -MS  3  -MS    Moving from lying on back to sitting on the side of a flat bed without bedrails?  3  -EH  3  -MS  3  -MS    Moving to and from a bed to a chair (including a wheelchair)?  3  -EH  3  -MS  3  -MS    Standing up from a chair using your arms (e.g., wheelchair, bedside chair)?  3  -EH  3  -MS  3  -MS    Climbing 3-5 steps with a railing?  2  -EH  2  -MS  2  -MS    To walk in hospital room?  3  -EH  3  -MS  3  -MS    AM-PAC 6 Clicks Score  18  -EH  18  -MS  17  -MS       Functional Assessment    Outcome Measure Options  --  AM-PAC 6 Clicks Basic Mobility (PT)  -MS  AM-PAC 6 Clicks Basic Mobility (PT)  -MS    Row Name 04/16/19 1100 04/15/19 1300          How much help from another person do you currently need...    Turning from your back to your side while  in flat bed without using bedrails?  4  -EH  4  -MS     Moving from lying on back to sitting on the side of a flat bed without bedrails?  3  -EH  3  -MS     Moving to and from a bed to a chair (including a wheelchair)?  3  -EH  3  -MS     Standing up from a chair using your arms (e.g., wheelchair, bedside chair)?  3  -EH  2  -MS     Climbing 3-5 steps with a railing?  2  -EH  2  -MS     To walk in hospital room?  3  -EH  3  -MS     AM-PAC 6 Clicks Score  18  -EH  17  -MS        Functional Assessment    Outcome Measure Options  --  AM-PAC 6 Clicks Basic Mobility (PT)  -MS       User Key  (r) = Recorded By, (t) = Taken By, (c) = Cosigned By    Initials Name Provider Type    MS Kan Williams EVERARDO, PT Physical Therapist     Shirley Yee PTA Physical Therapy Assistant         Time Calculation:   PT Charges     Row Name 04/18/19 1150             Time Calculation    Start Time  0835  -      Stop Time  0914  -      Time Calculation (min)  39 min  -      PT Received On  04/18/19  -      PT - Next Appointment  04/18/19  -         Time Calculation- PT    Total Timed Code Minutes- PT  35 minute(s)  -        User Key  (r) = Recorded By, (t) = Taken By, (c) = Cosigned By    Initials Name Provider Type     Shirley Yee PTA Physical Therapy Assistant        Therapy Charges for Today     Code Description Service Date Service Provider Modifiers Qty    93414952711 HC PT THER PROC EA 15 MIN 4/18/2019 Shirley Yee PTA GP 1    10940201233 HC PT THER PROC GROUP 4/18/2019 Shirley Yee PTA GP 1          PT G-Codes  Outcome Measure Options: AM-PAC 6 Clicks Basic Mobility (PT)  AM-PAC 6 Clicks Score: 18    Shirley Yee PTA  4/18/2019

## 2019-04-18 NOTE — PLAN OF CARE
Problem: Patient Care Overview  Goal: Plan of Care Review  Outcome: Ongoing (interventions implemented as appropriate)   04/18/19 5991   Coping/Psychosocial   Plan of Care Reviewed With patient   OTHER   Outcome Summary patient ambulating with assistance to bsc, SOB with longer distances r/t pt hx, O2 on at all times, educated on b/p monitoring   Plan of Care Review   Progress improving     Goal: Individualization and Mutuality  Outcome: Ongoing (interventions implemented as appropriate)    Goal: Discharge Needs Assessment  Outcome: Ongoing (interventions implemented as appropriate)    Goal: Interprofessional Rounds/Family Conf  Outcome: Ongoing (interventions implemented as appropriate)      Problem: Fall Risk (Adult)  Goal: Absence of Fall  Outcome: Ongoing (interventions implemented as appropriate)      Problem: Hip Arthroplasty (Total, Partial) (Adult)  Goal: Signs and Symptoms of Listed Potential Problems Will be Absent, Minimized or Managed (Hip Arthroplasty)  Outcome: Ongoing (interventions implemented as appropriate)

## 2019-04-19 NOTE — THERAPY TREATMENT NOTE
Acute Care - Physical Therapy Treatment Note  [facility]     Patient Name: Maryjo Wynn  : 1945  MRN: [id number]  Today's Date: 2019  Onset of Illness/Injury or Date of Surgery: 04/15/19  Date of Referral to PT: 04/15/19  Referring Physician: Josh Haywood    Admit Date: 4/15/2019    Visit Dx:    ICD-10-CM ICD-9-CM   1. Difficulty walking R26.2 719.7   2. Primary osteoarthritis of hip, unspecified laterality M16.10 715.15     Patient Active Problem List   Diagnosis   • Degenerative joint disease (DJD) of hip       Therapy Treatment    Rehabilitation Treatment Summary     Row Name 19 1355             Treatment Time/Intention    Discipline  physical therapy assistant  -      Document Type  therapy note (daily note)  -      Subjective Information  complains of;fatigue  -      Mode of Treatment  physical therapy;group therapy  -      Care Plan Review  patient/other agree to care plan  -      Therapy Frequency (PT Clinical Impression)  2 times/day  -      Patient Effort  good  -      Existing Precautions/Restrictions  oxygen therapy device and L/min;hip, posterior LLE  -      Recorded by [] Shirley Yee PTA 19 1356      Row Name 19 1355             Cognitive Assessment/Intervention- PT/OT    Orientation Status (Cognition)  oriented x 4  -      Follows Commands (Cognition)  WNL  -      Personal Safety Interventions  fall prevention program maintained;gait belt;nonskid shoes/slippers when out of bed  -EH      Recorded by [] Shirley Yee PTA 19 1356      Row Name 19 1355             Mobility Assessment/Intervention    Left Lower Extremity (Weight-bearing Status)  weight-bearing as tolerated (WBAT)  -      Recorded by [] Shirley Yee PTA 19 1356      Row Name 19 1355             Sit-Stand Transfer    Sit-Stand [place] (Transfers)  contact guard  -      Assistive Device (Sit-Stand Transfers)  walker, front-wheeled  -       Recorded by [] Shirley Yee, SHANNEN 04/19/19 1356      Row Name 04/19/19 1355             Stand-Sit Transfer    Stand-Sit [place] (Transfers)  contact guard  -      Assistive Device (Stand-Sit Transfers)  walker, front-wheeled  -EH      Recorded by [] Shirley Yee, SHANNEN 04/19/19 1356      Row Name 04/19/19 1355             Gait/Stairs Assessment/Training    [place] Level (Gait)  contact guard  -EH      Assistive Device (Gait)  walker, front-wheeled  -EH      Distance in Feet (Gait)  10  -EH      Pattern (Gait)  step-to  -EH      Deviations/Abnormal Patterns (Gait)  antalgic;stride length decreased  -EH      Recorded by [] Shirley Yee, SHANNEN 04/19/19 1356      Row Name 04/19/19 1355             Therapeutic Exercise    Comment (Therapeutic Exercise)  Left THR protocol X30 reps  -EH      Recorded by [] Shirley Yee PTA 04/19/19 1356      Row Name 04/19/19 1355             Positioning and Restraints    Pre-Treatment Position  sitting in chair/recliner  -EH      Post Treatment Position  chair  -EH      In Chair  reclined;call light within reach;encouraged to call for assist  -EH      Recorded by [] Shirley Yee, [facility] 04/19/19 1356      Row Name 04/19/19 1355             Pain Scale: Numbers Pre/Post-Treatment    Pain Location - Side  Left  -EH      Pain Location  hip  -EH      Recorded by [] Shirley Yee, SHANNEN 04/19/19 1356      Row Name                Wound 04/15/19 0901 Left hip incision    Wound - Properties Group Date first assessed: 04/15/19 [SB] Time first assessed: 0901 [SB] Side: Left [SB] Location: hip [SB] Type: incision [SB] Recorded by:  [SB] Jannet Braxton RN 04/15/19 0901      User Key  (r) = Recorded By, (t) = Taken By, (c) = Cosigned By    Initials Name Effective Dates Discipline    SB Jannet Braxton RN 06/16/16 -  Nurse     Shirley Yee PTA 08/19/18 -  PT          Wound 04/15/19 0901 Left hip incision (Active)   Dressing Appearance dry;intact;no drainage  4/19/2019  8:09 AM   Closure DRAGAN 4/19/2019  8:09 AM   Base dressing in place, unable to visualize 4/19/2019  8:09 AM   Drainage Amount moderate 4/19/2019  8:09 AM           Physical Therapy Education     Title: PT OT SLP Therapies (Done)     Topic: Physical Therapy (Done)     Point: Mobility training (Done)     Learning Progress Summary           Patient Acceptance, E,TB,D, VU,DU by  at 4/18/2019 11:50 AM    Acceptance, E,D, VU,NR by MS at 4/17/2019  2:26 PM    Acceptance, E,D, VU,NR by MS at 4/17/2019 11:14 AM    Acceptance, E,D, VU,DU by  at 4/16/2019 11:47 AM    Acceptance, E,D, VU,NR by MS at 4/15/2019  1:48 PM                   Point: Home exercise program (Done)     Learning Progress Summary           Patient Acceptance, E,TB,D, VU,DU by  at 4/18/2019 11:50 AM    Acceptance, E,D, VU,NR by MS at 4/17/2019  2:26 PM    Acceptance, E,D, VU,NR by MS at 4/17/2019 11:14 AM    Acceptance, E,D, VU,DU by  at 4/16/2019 11:47 AM    Acceptance, E,D, VU,NR by MS at 4/15/2019  1:48 PM                   Point: Body mechanics (Done)     Learning Progress Summary           Patient Acceptance, E,TB,D, VU,DU by  at 4/18/2019 11:50 AM    Acceptance, E,D, VU,NR by MS at 4/17/2019  2:26 PM    Acceptance, E,D, VU,NR by MS at 4/17/2019 11:14 AM    Acceptance, E,D, VU,DU by  at 4/16/2019 11:47 AM    Acceptance, E,D, VU,NR by MS at 4/15/2019  1:48 PM                   Point: Precautions (Done)     Learning Progress Summary           Patient Acceptance, E,TB,D, VU,DU by  at 4/18/2019 11:50 AM    Acceptance, E,D, VU,NR by MS at 4/17/2019  2:26 PM    Acceptance, E,D, VU,NR by MS at 4/17/2019 11:14 AM    Acceptance, E,D, VU,DU by  at 4/16/2019 11:47 AM    Acceptance, ANNA MEDEIROS VU, NR by MS at 4/15/2019  1:48 PM                               User Key     Initials Effective Dates Name Provider Type Discipline    MS 04/03/18 -  Williams Kan, PT Physical Therapist PT     08/19/18 -  Shirley Yee PTA Physical Therapy Assistant  PT                PT Recommendation and Plan  Anticipated Discharge Disposition (PT): home with assist, home with home health  Therapy Frequency (PT Clinical Impression): 2 times/day  Outcome Summary/Treatment Plan (PT)  Anticipated Discharge Disposition (PT): home with assist, home with home health  Plan of Care Reviewed With: patient  Progress: improving  Outcome Summary: pt tolerated treatment with no complaints. Pt able to perform THR protocol exercises with increased reps with minimal c/o difficulty. Pt ambulated 25 feet with rwx, CGA. Pt's ambulation distance limited due to pt feeling SOA, however, pt progressing well with PT.   Outcome Measures     Row Name 04/19/19 1300 04/18/19 1100 04/17/19 1400       How much help from another person do you currently need...    Turning from your back to your side while in flat bed without using bedrails?  4  -EH  4  -EH  4  -MS    Moving from lying on back to sitting on the side of a flat bed without bedrails?  3  -EH  3  -EH  3  -MS    Moving to and from a bed to a chair (including a wheelchair)?  3  -EH  3  -EH  3  -MS    Standing up from a chair using your arms (e.g., wheelchair, bedside chair)?  3  -EH  3  -EH  3  -MS    Climbing 3-5 steps with a railing?  3  -EH  2  -EH  2  -MS    To walk in hospital room?  3  -EH  3  -EH  3  -MS    AM-PAC 6 Clicks Score  19  -EH  18  -EH  18  -MS       Functional Assessment    Outcome Measure Options  --  --  AM-PAC 6 Clicks Basic Mobility (PT)  -MS    Row Name 04/17/19 1100             How much help from another person do you currently need...    Turning from your back to your side while in flat bed without using bedrails?  3  -MS      Moving from lying on back to sitting on the side of a flat bed without bedrails?  3  -MS      Moving to and from a bed to a chair (including a wheelchair)?  3  -MS      Standing up from a chair using your arms (e.g., wheelchair, bedside chair)?  3  -MS      Climbing 3-5 steps with a railing?  2  -MS       To walk in hospital room?  3  -MS      AM-PAC 6 Clicks Score  17  -MS         Functional Assessment    Outcome Measure Options  AM-PAC 6 Clicks Basic Mobility (PT)  -MS        User Key  (r) = Recorded By, (t) = Taken By, (c) = Cosigned By    Initials Name Provider Type    Williams [place], PT Physical Therapist     Shirley Yee PTA Physical Therapy Assistant         Time Calculation:   PT Charges     Row Name 04/19/19 1357             Time Calculation    Start Time  0833  -      Stop Time  0925  -      Time Calculation (min)  52 min  -      PT Received On  04/19/19  -      PT - Next Appointment  04/20/19  -         Time Calculation- PT    Total Timed Code Minutes- PT  35 minute(s)  -        User Key  (r) = Recorded By, (t) = Taken By, (c) = Cosigned By    Initials Name Provider Type     Shirley Yee PTA Physical Therapy Assistant        Therapy Charges for Today     Code Description Service Date Service Provider Modifiers Qty    54506430314 HC PT THER PROC EA 15 MIN 4/18/2019 Shirley Yee, PTA GP 1    41767639460 HC PT THER PROC GROUP 4/18/2019 Shirley Yee, PTA GP 1    04475333094 HC PT THER PROC EA 15 MIN 4/18/2019 Shirley Yee, PTA GP 1    69585602231 HC PT THER PROC GROUP 4/18/2019 Shirley Yee, SHANNEN GP 1    12895540848 HC PT THER PROC EA 15 MIN 4/19/2019 Shirley Yee, PTA GP 1    49475028279 HC PT THER PROC GROUP 4/19/2019 Shirley Yee, SHANNEN GP 1          PT G-Codes  Outcome Measure Options: AM-PAC 6 Clicks Basic Mobility (PT)  AM-PAC 6 Clicks Score: 19    Shirley Yee PTA  4/19/2019

## 2019-04-19 NOTE — PLAN OF CARE
Problem: Patient Care Overview  Goal: Plan of Care Review  Outcome: Ongoing (interventions implemented as appropriate)   04/19/19 8706   Coping/Psychosocial   Plan of Care Reviewed With patient   OTHER   Outcome Summary Pain well controlled with PO pain medication. Ambualtes with assist x1 and walker. VSS. Voiding without difficulty. DC home with home health today. Potassium better today. Educated about BP monitoring.    Plan of Care Review   Progress improving     Goal: Individualization and Mutuality  Outcome: Ongoing (interventions implemented as appropriate)    Goal: Discharge Needs Assessment  Outcome: Ongoing (interventions implemented as appropriate)    Goal: Interprofessional Rounds/Family Conf  Outcome: Ongoing (interventions implemented as appropriate)      Problem: Fall Risk (Adult)  Goal: Absence of Fall  Outcome: Ongoing (interventions implemented as appropriate)      Problem: Hip Arthroplasty (Total, Partial) (Adult)  Goal: Signs and Symptoms of Listed Potential Problems Will be Absent, Minimized or Managed (Hip Arthroplasty)  Outcome: Ongoing (interventions implemented as appropriate)

## 2019-04-19 NOTE — PROGRESS NOTES
"  Orthopaedic Surgery   Daily Progress Note  Dr. Josh Ramírez Sr  [phone]  DEMOGRAPHICS:   · Maryjo Wynn   · Age:74 y.o.   · MRN:[id number]  · Admitted: 4/15/2019    PROCEDURE: 4 Days Post-Op s/p Procedure(s):  LEFT TOTAL HIP ARTHROPLASTY     /78 (BP Location: Left arm, Patient Position: Lying)   Pulse 107   Temp 98.2 °F (36.8 °C) (Oral)   Resp 18   Ht 152.4 cm (60\")   Wt 49 kg (108 lb)   SpO2 97%   BMI 21.09 kg/m²     Lab Results (last 24 hours)     Procedure Component Value Units Date/Time    Basic Metabolic Panel [[id number]]  (Abnormal) Collected:  04/19/19 0629    Specimen:  Blood Updated:  04/19/19 0757     Glucose 93 mg/dL      BUN 7 mg/dL      Creatinine 0.17 mg/dL      Sodium 143 mmol/L      Potassium 3.9 mmol/L      Chloride 91 mmol/L      CO2 46.3 mmol/L      Calcium 9.0 mg/dL      eGFR Non African Amer >150 mL/min/1.73      BUN/Creatinine Ratio 41.2     Anion Gap 5.7 mmol/L     Narrative:       GFR Normal >60  Chronic Kidney Disease <60  Kidney Failure <15    CBC & Differential [[id number]] Collected:  04/19/19 0629    Specimen:  Blood Updated:  04/19/19 0754    Narrative:       The following orders were created for panel order CBC & Differential.  Procedure                               Abnormality         Status                     ---------                               -----------         ------                     CBC Auto Differential[[id number]]        Abnormal            Final result                 Please view results for these tests on the individual orders.    CBC Auto Differential [[id number]]  (Abnormal) Collected:  04/19/19 0629    Specimen:  Blood Updated:  04/19/19 0754     WBC 11.20 10*3/mm3      RBC 3.35 10*6/mm3      Hemoglobin 10.4 g/dL      Hematocrit 35.8 %      .9 fL      MCH 31.0 pg      MCHC 29.1 g/dL      RDW 16.6 %      RDW-SD 65.8 fl      MPV 11.6 fL      Platelets 224 10*3/mm3      Neutrophil % 79.3 %      Lymphocyte % 11.2 %      Monocyte % 8.4 %  "     Eosinophil % 0.2 %      Basophil % 0.1 %      Immature Grans % 0.8 %      Neutrophils, Absolute 8.89 10*3/mm3      Lymphocytes, Absolute 1.25 10*3/mm3      Monocytes, Absolute 0.94 10*3/mm3      Eosinophils, Absolute 0.02 10*3/mm3      Basophils, Absolute 0.01 10*3/mm3      Immature Grans, Absolute 0.09 10*3/mm3      nRBC 0.0 /100 WBC     Potassium [[id number]]  (Normal) Collected:  04/18/19 1419    Specimen:  Blood Updated:  04/18/19 1513     Potassium 4.0 mmol/L           Imaging Results (last 24 hours)     ** No results found for the last 24 hours. **          Patient Care Team:  Rashi Ogden MD as PCP - General (Family Medicine)  Linda Conde MD as Consulting Physician (Obstetrics and Gynecology)  Frank Staton MD as Consulting Physician (Pulmonary Disease)  Ryan Diaz MD as Consulting Physician (Interventional Cardiology)    SUBJECTIVE  Comfortable    PHYSICAL EXAM  Wound looks fine.  She has had some scant serous drainage     ASSESSMENT: Patient is a 74 y.o. female who is 4 Days Post-Op s/p Procedure(s):  LEFT TOTAL HIP ARTHROPLASTY     PLAN / DISPOSITION:  Home health discharge today    Josh Ramírez Sr, MD  Orthopaedic Surgery  [facility]  [phone]

## 2019-04-19 NOTE — PLAN OF CARE
Problem: Patient Care Overview  Goal: Plan of Care Review  Outcome: Ongoing (interventions implemented as appropriate)   04/18/19 2898   Coping/Psychosocial   Plan of Care Reviewed With patient   OTHER   Outcome Summary patient ambulating with assistance to bsc, pain controlled at this time, educated on b/p monitoring   Plan of Care Review   Progress improving     Goal: Individualization and Mutuality  Outcome: Ongoing (interventions implemented as appropriate)    Goal: Discharge Needs Assessment  Outcome: Ongoing (interventions implemented as appropriate)    Goal: Interprofessional Rounds/Family Conf  Outcome: Ongoing (interventions implemented as appropriate)      Problem: Fall Risk (Adult)  Goal: Absence of Fall  Outcome: Ongoing (interventions implemented as appropriate)      Problem: Hip Arthroplasty (Total, Partial) (Adult)  Goal: Signs and Symptoms of Listed Potential Problems Will be Absent, Minimized or Managed (Hip Arthroplasty)  Outcome: Ongoing (interventions implemented as appropriate)

## 2019-04-19 NOTE — DISCHARGE SUMMARY
Discharge Summary   Josh Ramírez M.D.    NAME: Maryjo Wynn ADMIT: 4/15/2019   : 1945  PCP: Rashi Ogden MD    MRN: [id number] LOS: 4 days   AGE/SEX: 74 y.o. female  ROOM: P779/1       Date of Discharge:      Primary Discharge Diagnosis: Avascular necrosis left hip with some degenerative arthritis present.    Secondary Discharge Diagnosis:    Problem List Items Addressed This Visit        Musculoskeletal and Integument    Degenerative joint disease (DJD) of hip    Relevant Orders    Referral to home health      Other Visit Diagnoses     Difficulty walking    -  Primary          Procedures Performed:  Left Total Hip Arthroplasty      Hospital Course:    Maryjo Wynn is a 74 y.o.  female who underwent successful Left Total Hip Arthroplasty on 4/15/2019.  Maryjo Wynn was started on Aspirin 325mg po daily immediately post-operatively for DVT prophylaxis.  On post-op day 1 the patients dressing was changed and their incision was clean, with no signs of infection and their calf was soft, with no signs of DVT.  The patient progressed well with physical therapy and the patients hemoglobin remained stable. On post-operative day 4 the patient was felt ready for discharge.      Total Hip Replacement Discharge Instructions:    I. ACTIVITIES:    1. Exercises:  ? Complete exercise program as taught by the hospital physical therapist 2 times per day  ? Exercise program will be advanced by the physical therapist  ? During the day be up ambulating every 2 hours (while awake) for short distances  ? Complete the ankle pump exercises at least 10 times per hour (while awake)  ? Elevate legs when in bed and for at least 30 minutes during the day.Use cold packs 20-30 minutes approximately 5 times per day. This should be done before and after completing your exercises and at any time you are experiencing pain/ stiffness in your operative extremity.    2. Activities of Daily Living:  ? No tub baths, hot  tubs, or swimming pools for 4 weeks  ? May shower and let water run over the incision on post-operative day #5 if no drainage. Do not scrub or rub the incision. Simply let the water run over the incision and pat dry.    II. Precautions:  ? Everyone that comes near you should wash their hands  ? No elective dental, genital-urinary, or colon procedures or surgical procedures for 12 weeks after surgery unless absolutely necessary.  ? If dental work or surgical procedure is deemed absolutely necessary during the first 12 weeks, you will need to contact your surgeon as you will need to take antibiotics 1 hour prior to any dental work (including teeth cleanings).Please discuss with your surgeon prophylactic antibiotics as the length of time this intervention will be necessary for you varies with each patient’s health history and situation.  ? Avoid sick people. If you must be around someone who is ill, they should wear a mask.  ? Avoid visits to the Emergency Room or Urgent Care unless you are having a life threatening event.   ? If ordered stockings are to be placed on in the morning and removed at night. Monitor the stockings to ensure that any swelling is not causing the stockings to become too tight. In this case, remove stockings immediately.    III. INCISION CARE:    ? Wash your hands prior to dressing changes  ? Change the dressing as needed to keep incision clean and dry. Utilize dry gauze and paper tape. Avoid touching the side of the gauze that goes against the incision with your hands.  ? No creams or ointments to the incision  ? May remove dressing once the incision is free of drainage  ? Do not touch or pick at the incision  ? Check incision every day and notify surgeon immediately if any of the following signs or symptoms are noted:  o Increase in redness  o Increase in swelling around the incision and of the entire extremity  o Increase in pain  o Drainage oozing from the incision  o Pulling apart of the  edges of the incision  o Increase in overall body temperature (greater than 100.5 degrees)  ? Your surgeon will instruct you regarding suture or staple removal    IV. Medications:     1. Anticoagulants: You will be discharged on an anticoagulant. This is a prophylactic medication that helps prevent blood clots during your post-operative period. The type and length of dosage varies based on your individual needs, procedure performed, and surgeon’s preference.  ? While taking the anticoagulant, you should avoid taking any additional aspirin, ibuprofen (Advil or Motrin), Aleve (Naprosyn) or other non-steroidal anti-inflammatory medications.   ? Notify surgeon immediately if any marek bleeding is noted in the urine, stool, emesis, or from the nose or the incision. Blood in the stool will often appear as black rather than red. Blood in urine may appear as pink. Blood in emesis may appear as brown/black like coffee grounds.  ? You will need to apply pressure for longer periods of time to any cuts or abrasions to stop bleeding  ? Avoid alcohol while taking anticoagulants    2. Stool Softeners: You will be at greater risk of constipation after surgery due to being less mobile and the pain medications.   ? Take stool softeners as instructed by your surgeon while on pain medications. Bran cereal is most effective. Over the counter Colace 100 mg 1-2 capsules twice daily.   ? If stools become too loose or too frequent, please decreases the dosage or stop the stool softener.  ? If constipation occurs despite use of stool softeners, you are to continue the stool softeners and add a laxative (Milk of Magnesia 1 ounce daily as needed)  ? Drink plenty of fluids, and eat fruits and vegetables during your recovery time    3. Pain Medications utilized after surgery are narcotics and the law requires that the following information be given to all patients that are prescribed narcotics:  ? CLASSIFICATION: Pain medications are called  Opioids and are narcotics  ? LEGALITIES: It is illegal to share narcotics with others and to drive within 24 hours of taking narcotics  ? POTENTIAL SIDE EFFECTS: Potential side effects of opioids include: nausea, vomiting, itching, dizziness, drowsiness, dry mouth, constipation, and difficulty urinating.  ? POTENTIAL ADVERSE EFFECTS:   o Opioid tolerance can develop with use of pain medications and this simply means that it requires more and more of the medication to control pain; however, this is seen more in patients that use opioids for longer periods of time.  o Opioid dependence can develop with use of Opioids and this simply means that to stop the medication can cause withdrawal symptoms; however, this is seen with patients that use Opioids for longer periods of time.  o Opioid addiction can develop with use of Opioids and the incidence of this is very unlikely in patients who take the medications as ordered and stop the medications as instructed.  o Opioid overdose can be dangerous, but is unlikely when the medication is taken as ordered and stopped when ordered. It is important not to mix opioids with alcohol or with and type of sedative such as Benadryl as this can lead to over sedation and respiratory difficulty.  ? DOSAGE:   o Pain medications will need to be taken consistently for the first week to decrease pain and promote adequate pain relief and participation in physical therapy.  o After the initial surgical pain begins to resolve, you may begin to decrease the pain medication. By the end of 6-8 weeks, you should be off of pain medications.  o Refills will not be given by the office during evening hours, on weekends, or after 6-8 weeks post-op.  o To seek refills on pain medications during the initial 6 week post-operative period, you must call the office 48 hours in advance to request the refill. The office will then notify you when to  the prescription. DO NOT wait until you are out of the  medication to request a refill.    V. FOLLOW-UP VISITS:  ? You will need to follow up in the office with your surgeon in 3 weeks. Please call this number [phone]  to schedule this appointment.  If you have any concerns or suspected complications prior to your follow up visit, please call your surgeons office. Do not wait until your appointment time if you suspect complications. These will need to be addressed in the office promptly.    Discharge Medications:    1) hydrocodone 5/325 1-2 po q 4-6 hours prn pain  2)  Enteric Coated Aspirin 81 mg po daily for 6 weeks.      Josh Ramírez MD  4/19/2019  8:19 AM

## 2019-04-20 PROBLEM — I50.32 CHRONIC DIASTOLIC CHF (CONGESTIVE HEART FAILURE) (HCC): Status: ACTIVE | Noted: 2019-01-01

## 2019-04-20 PROBLEM — J44.9 COPD (CHRONIC OBSTRUCTIVE PULMONARY DISEASE) (HCC): Status: ACTIVE | Noted: 2019-01-01

## 2019-04-20 PROBLEM — I25.10 CAD (CORONARY ARTERY DISEASE): Status: ACTIVE | Noted: 2019-01-01

## 2019-04-20 PROBLEM — J18.9 PNA (PNEUMONIA): Status: ACTIVE | Noted: 2019-01-01

## 2019-04-20 PROBLEM — J96.21 ACUTE AND CHRONIC RESPIRATORY FAILURE WITH HYPOXIA (HCC): Status: ACTIVE | Noted: 2019-01-01

## 2019-04-20 NOTE — OUTREACH NOTE
Prep Survey      Responses   Facility patient discharged from?  [facility]   Is patient eligible?  Yes   Discharge diagnosis  Avascular necrosis left hip with some degenerative arthritis present. left total hip   Does the patient have one of the following disease processes/diagnoses(primary or secondary)?  Total Joint Replacement   Does the patient have Home health ordered?  Yes   What is the Home health agency?   [facility]   Is there a DME ordered?  No   Prep survey completed?  Yes          Kaylee Bright RN

## 2019-04-21 PROBLEM — J96.02 ACUTE RESPIRATORY FAILURE WITH HYPOXIA AND HYPERCAPNIA (HCC): Status: ACTIVE | Noted: 2019-01-01

## 2019-04-21 PROBLEM — I48.91 POSTOPERATIVE ATRIAL FIBRILLATION (HCC): Status: ACTIVE | Noted: 2018-03-20

## 2019-04-21 PROBLEM — Z95.1 HX OF CABG: Status: ACTIVE | Noted: 2019-01-01

## 2019-04-21 PROBLEM — K55.1 SUPERIOR MESENTERIC ARTERY STENOSIS (HCC): Chronic | Status: ACTIVE | Noted: 2019-01-01

## 2019-04-21 PROBLEM — I48.0 PAROXYSMAL ATRIAL FIBRILLATION (HCC): Status: ACTIVE | Noted: 2019-01-01

## 2019-04-21 PROBLEM — R93.1 ECHOCARDIOGRAM ABNORMAL: Status: ACTIVE | Noted: 2018-03-20

## 2019-04-21 PROBLEM — Z78.9 STATIN INTOLERANCE: Status: ACTIVE | Noted: 2018-03-20

## 2019-04-21 PROBLEM — J96.01 ACUTE RESPIRATORY FAILURE WITH HYPOXIA AND HYPERCAPNIA (HCC): Status: ACTIVE | Noted: 2019-01-01

## 2019-04-21 PROBLEM — I73.9 PVD (PERIPHERAL VASCULAR DISEASE) (HCC): Status: ACTIVE | Noted: 2018-03-19

## 2019-04-21 PROBLEM — I07.1 TR (TRICUSPID REGURGITATION): Status: ACTIVE | Noted: 2019-01-01

## 2019-04-21 PROBLEM — J96.22 ACUTE ON CHRONIC RESPIRATORY FAILURE WITH HYPOXIA AND HYPERCAPNIA (HCC): Status: ACTIVE | Noted: 2019-01-01

## 2019-04-21 PROBLEM — J96.11 CHRONIC HYPOXEMIC RESPIRATORY FAILURE (HCC): Status: ACTIVE | Noted: 2018-04-29

## 2019-04-21 PROBLEM — K27.9 PUD (PEPTIC ULCER DISEASE): Status: ACTIVE | Noted: 2018-04-29

## 2019-04-21 PROBLEM — I97.89 POSTOPERATIVE ATRIAL FIBRILLATION (HCC): Status: ACTIVE | Noted: 2018-03-20

## 2019-04-21 PROBLEM — I25.810 CAD (CORONARY ARTERY DISEASE), AUTOLOGOUS VEIN BYPASS GRAFT: Status: ACTIVE | Noted: 2018-02-28

## 2019-04-23 NOTE — OUTREACH NOTE
Total Joint Week 1 Survey      Responses   Facility patient discharged from?  [facility]   Does the patient have one of the following disease processes/diagnoses(primary or secondary)?  Total Joint Replacement   Is there a successful TCM telephone encounter documented?  No   Week 1 attempt successful?  No   Revoke  Readmitted          Christa Bianchi RN

## 2019-04-26 NOTE — OUTREACH NOTE
Prep Survey      Responses   Facility patient discharged from?  [place] [facility]   Is patient eligible?  Yes   Discharge diagnosis   acute on chronic respiratory failure   COPD exacerbation   Does the patient have one of the following disease processes/diagnoses(primary or secondary)?  COPD/Pneumonia   Does the patient have Home health ordered?  Yes   What is the Home health agency?   [facility]   Is there a DME ordered?  No   Prep survey completed?  Yes          Jessica Rhodes RN

## 2019-04-27 NOTE — OUTREACH NOTE
COPD/PN Week 1 Survey      Responses   Facility patient discharged from?  [facility]   Does the patient have one of the following disease processes/diagnoses(primary or secondary)?  COPD/Pneumonia   Is there a successful TCM telephone encounter documented?  No   Was the primary reason for admission:  Pneumonia   Week 1 attempt successful?  Yes   Call start time  1523   Call end time  1529   Meds reviewed with patient/caregiver?  Yes   Is the patient having any side effects they believe may be caused by any medication additions or changes?  No   Does the patient have all medications ordered at discharge?  Yes   Is the patient taking all medications as directed (includes completed medication regime)?  Yes   Does the patient have a primary care provider?   Yes   Does the patient have an appointment with their PCP or pulmonologist within 7 days of discharge?  No   Comments regarding PCP  Does not have appointment with PCP yet will call on Monday.    What is preventing the patient from scheduling follow up appointments within 7 days of discharge?  Haven't had time   Nursing Interventions  Educated patient on importance of making appointment   Has the patient kept scheduled appointments due by today?  N/A   Has home health visited the patient within 72 hours of discharge?  Yes   What DME was ordered?  walker, commode elevation device, cane, shower seat   Has all DME been delivered?  Yes   Psychosocial issues?  No   Did the patient receive a copy of their discharge instructions?  Yes   Nursing interventions  Reviewed instructions with patient   What is the patient's perception of their health status since discharge?  Improving   Are the patient's immunizations up to date?   Yes   Is the patient/caregiver able to teach back the hierarchy of who to call/visit for symptoms/problems? PCP, Specialist, Home health nurse, Urgent Care, ED, 911  Yes   Is the patient/caregiver able to teach back signs and symptoms of worsening  condition:  Fever/chills, Shortness of breath, Chest pain   Is the patient/caregiver able to teach back importance of completing antibiotic course of treatment?  Yes   Week 1 call completed?  Yes          Conchita Back RN

## 2019-05-03 NOTE — OUTREACH NOTE
COPD/PN Week 2 Survey      Responses   Facility patient discharged from?  [facility]   Does the patient have one of the following disease processes/diagnoses(primary or secondary)?  COPD/Pneumonia   Was the primary reason for admission:  Pneumonia   Week 2 attempt successful?  Yes   Call start time  1418   Call end time  1423   Discharge diagnosis   Pneumonia/  acute on chronic respiratory failure   COPD exacerbation   Is patient permission given to speak with other caregiver?  Yes   List who call center can speak with  Spouse   Meds reviewed with patient/caregiver?  Yes   Is the patient taking all medications as directed (includes completed medication regime)?  Yes   Has the patient kept scheduled appointments due by today?  N/A   Comments  Appt 5/6/19   What is the Home health agency?   [facility]   Has home health visited the patient within 72 hours of discharge?  Yes   What DME was ordered?  walker, commode elevation device, cane, shower seat   Has all DME been delivered?  Yes   Did the patient receive a copy of their discharge instructions?  Yes   Nursing interventions  Reviewed instructions with patient   What is the patient's perception of their health status since discharge?  Improving   Is the patient/caregiver able to teach back importance of completing antibiotic course of treatment?  Yes   Week 2 call completed?  Yes   Wrap up additional comments  Afebrile. Finished antibiotic. Still has good and bad day with her COPD but pneumonia improving. Appt on 5/6/19 will be first follow up.           Myriam Rutledge RN

## 2019-05-10 NOTE — OUTREACH NOTE
COPD/PN Week 3 Survey      Responses   Facility patient discharged from?  [facility]   Does the patient have one of the following disease processes/diagnoses(primary or secondary)?  COPD/Pneumonia   Was the primary reason for admission:  Pneumonia   Week 3 attempt successful?  No   Unsuccessful attempts  Attempt 1          Dominique Mcdermott RN

## 2019-05-14 NOTE — OUTREACH NOTE
COPD/PN Week 3 Survey      Responses   Facility patient discharged from?  [facility]   Does the patient have one of the following disease processes/diagnoses(primary or secondary)?  COPD/Pneumonia   Was the primary reason for admission:  Pneumonia   Week 3 attempt successful?  No   Unsuccessful attempts  Attempt 2          Julian Souza RN

## 2019-05-30 PROBLEM — S73.005A CLOSED DISLOCATION OF LEFT HIP (HCC): Status: ACTIVE | Noted: 2019-01-01

## 2019-05-31 NOTE — ANESTHESIA PREPROCEDURE EVALUATION
Anesthesia Evaluation                  Airway   Mallampati: II  Neck ROM: full  no difficulty expected  Dental - normal exam     Pulmonary - normal exam   (+) pneumonia , a smoker Former, COPD, asthma,     ROS comment: On home O2;  Chronic respiratory failure  PE comment: nonlabored  Cardiovascular - normal exam    Rhythm: regular  Rate: normal    (+) hypertension, valvular problems/murmurs TI, CAD, CABG, dysrhythmias Paroxysmal Atrial Fib, CHF (diastolic), PVD, hyperlipidemia,  carotid artery disease      Neuro/Psych  GI/Hepatic/Renal/Endo    (+)  PUD, GI bleeding,     Musculoskeletal     (+) arthralgias,       ROS comment: Dislocated L Hip  Abdominal    Substance History      OB/GYN          Other   (+) blood dyscrasia (anemia), arthritis                     Anesthesia Plan    ASA 3 - emergent     MAC   (Plan MAC with possible conversion to general as necessary if hip dislocation is difficult to reduce)  Anesthetic plan, all risks, benefits, and alternatives have been provided, discussed and informed consent has been obtained with: patient.

## 2019-05-31 NOTE — ANESTHESIA POSTPROCEDURE EVALUATION
Patient: Maryjo Wynn    Procedure Summary     Date:  05/30/19 Room / Location:  [facility] OR [address] [place] [address] MAIN OR    Anesthesia Start:  2201 Anesthesia Stop:  2217    Procedure:  HIP CLOSED REDUCTION (Left Hip) Diagnosis:      Surgeon:  Josh Ramírez II, MD Provider:  Levi Sawyer MD    Anesthesia Type:  MAC ASA Status:  3 - Emergent          Anesthesia Type: MAC  Last vitals  BP   177/87 (05/30/19 2230)   Temp   36.7 °C (98.1 °F) (05/30/19 2219)   Pulse   97 (05/30/19 2230)   Resp   16 (05/30/19 2230)     SpO2   100 % (05/30/19 2230)     Post Anesthesia Care and Evaluation    Patient location during evaluation: bedside  Patient participation: complete - patient participated  Level of consciousness: awake  Pain management: adequate  Airway patency: patent  Anesthetic complications: No anesthetic complications    Cardiovascular status: acceptable  Respiratory status: acceptable  Hydration status: acceptable    Comments: */87 (BP Location: Left arm, Patient Position: Lying)   Pulse 97   Temp 36.7 °C (98.1 °F) (Oral)   Resp 16   SpO2 100%

## 2019-06-01 NOTE — OUTREACH NOTE
Prep Survey      Responses   Facility patient discharged from?  [facility]   Is patient eligible?  Yes   Discharge diagnosis  Closed dislocation of left hip,   Closed reduction of left hip   Does the patient have one of the following disease processes/diagnoses(primary or secondary)?  General Surgery   Does the patient have Home health ordered?  Yes   What is the Home health agency?   [facility]    Is there a DME ordered?  No [Already current with [facility] for home O2]   Prep survey completed?  Yes          Erin Mora RN

## 2019-06-04 NOTE — OUTREACH NOTE
General Surgery Week 1 Survey      Responses   Facility patient discharged from?  [facility]   Does the patient have one of the following disease processes/diagnoses(primary or secondary)?  General Surgery   Is there a successful TCM telephone encounter documented?  No   Week 1 attempt successful?  Yes   Call start time  1438   Call end time  1441   Discharge diagnosis  Closed dislocation of left hip,   Closed reduction of left hip   Is patient permission given to speak with other caregiver?  Yes   List who call center can speak with  Spouse   Meds reviewed with patient/caregiver?  Yes   Is the patient having any side effects they believe may be caused by any medication additions or changes?  No   Does the patient have all medications related to this admission filled (includes all antibiotics, pain medications, etc.)  Yes   Is the patient taking all medications as directed (includes completed medication regime)?  Yes   Does the patient have a follow up appointment scheduled with their surgeon?  Yes   Has the patient kept scheduled appointments due by today?  N/A   What is the Home health agency?   [facility]    Has home health visited the patient within 72 hours of discharge?  Yes   Psychosocial issues?  No   Comments  Denies pain or other issues other than back pain. No s/sx inf.    Did the patient receive a copy of their discharge instructions?  Yes   Nursing interventions  Reviewed instructions with patient   What is the patient's perception of their health status since discharge?  Improving   Nursing interventions  Nurse provided patient education   Is the patient /caregiver able to teach back basic post-op care?  Take showers only when approved by MD-sponge bathe until then, Practice 'cough and deep breath', Do not remove steri-strips   Is the patient/caregiver able to teach back signs and symptoms of incisional infection?  Incisional warmth, Increased drainage or bleeding, Increased redness, swelling or pain at  the incisonal site   Is the patient/caregiver able to teach back steps to recovery at home?  Set small, achievable goals for return to baseline health, Eat a well-balance diet, Make a list of questions for surgeon's appointment   Is the patient/caregiver able to teach back the hierarchy of who to call/visit for symptoms/problems? PCP, Specialist, Home health nurse, Urgent Care, ED, 911  Yes   Week 1 call completed?  Yes          Anna Bill RN

## 2019-06-12 PROBLEM — S73.005A HIP DISLOCATION, LEFT (HCC): Status: ACTIVE | Noted: 2019-01-01

## 2019-06-12 NOTE — ED PROVIDER NOTES
The patient presents complaining of left hip pain that started today after sitting and twisting. Pt reports a few weeks ago she displaced the hip and had to have it reduced by Dr Ramírez.    Physical Exam:  Patient is nontoxic appearing and in NAD. The pt is alert and oriented X 3.  HENT: normocephalic, atraumatic  Back/extremities:    Left hip is shortened and internally rotated, normal pulse  Skin: warm and dry    Plan: Discussed plan to call Dr Ramírez. Pt understands and agrees with the plan. All questions have been answered.      MD ATTESTATION NOTE    The SAULO and I have discussed this patient's history, physical exam, and treatment plan.  I have reviewed the documentation and personally had a face to face interaction with the patient. I affirm the documentation and agree with the treatment and plan.  The attached note describes my personal findings.    Documentation assistance provided by rachel Alba for Dr Mckenzie. Information recorded by the scribe was done at my direction and has been verified and validated by me.     Essence Alba  06/12/19 1257       Italo Mckenzie MD  06/12/19 7655

## 2019-06-12 NOTE — H&P
Orthopaedic Surgery  History & Physical  Dr. IRMA Ramírez II  [phone]    HPI:  Patient is a 74 y.o. 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  2 weeks ago she dislocated her hip.  Radiographic evaluation of the implants reveal they were well-positioned.  It was theorized that her dislocation was due to poor muscle and tissue tone.  She redislocated her hip today and is admitted for surgical intervention.    MEDICAL HISTORY  Past Medical History:   Diagnosis Date   • Anxiety and depression    • Arthritis    • Asthma    • COPD (chronic obstructive pulmonary disease) (CMS/[facility])    • Coronary artery disease    • History of anemia    • History of GI bleed    • History of MI (myocardial infarction)    • History of transfusion    • Hyperlipidemia    • Hypertension    • Left hip pain    • On home oxygen therapy    • PVD (peripheral vascular disease) (CMS/[facility])    ·   Past Surgical History:   Procedure Laterality Date   • APPENDECTOMY     • CORONARY ARTERY BYPASS GRAFT  02/13/2017    4 VESSEL. ETOCH   • ELBOW OPEN REDUCTION INTERNAL FIXATION Left     WITH HARDWARE   • ENDOSCOPY      WITH CAUTERY OF BLEEDER   • FEMORAL ARTERY STENT      HISTORY OF GARY LEGS   • FEMORAL FEMORAL BYPASS     • HIP CLOSED REDUCTION Left 5/30/2019    Procedure: HIP CLOSED REDUCTION;  Surgeon: Josh Ramírez II, MD;  Location: [facility] OR;  Service: Orthopedics   • HIP SURGERY Right     SPLIT FEMUR SURGERY   • LUMBAR FUSION     • ORIF WRIST FRACTURE Left     WITH HARDWARE   • ORIF WRIST FRACTURE Right    • TOTAL HIP ARTHROPLASTY Right    • TOTAL HIP ARTHROPLASTY Left 4/15/2019    Procedure: LEFT TOTAL HIP ARTHROPLASTY;  Surgeon: Josh Ramírez MD;  Location: [facility];  Service: Orthopedics   • VASCULAR SURGERY      MULTIPLE STENTS PLAACED   ·   Prior to Admission medications    Medication Sig Start Date End Date  Taking? Authorizing Provider   albuterol sulfate  (90 Base) MCG/ACT inhaler Inhale 2 puffs Every 4 (Four) Hours As Needed for Shortness of Air.   Yes Edilson Cooper MD   ALPRAZolam (XANAX) 0.5 MG tablet Take 0.25 mg by mouth 3 (Three) Times a Day As Needed for Anxiety. 1/19/19  Yes Edilson Cooper MD   atorvastatin (LIPITOR) 10 MG tablet Take 10 mg by mouth Every Evening. 1/6/17  Yes Edilson Cooper MD   budesonide (PULMICORT) 0.5 MG/2ML nebulizer solution Inhale 0.5 mg 2 (Two) Times a Day. 11/14/18  Yes Edilson Cooper MD   busPIRone (BUSPAR) 10 MG tablet Take 10 mg by mouth 2 (Two) Times a Day.   Yes Edilson Cooper MD   CARAFATE 1 GM/10ML suspension Take 1 tablet by mouth 4 (Four) Times a Day After Meals & at Bedtime. 4/5/19  Yes Edilson Cooper MD   carvedilol (COREG) 6.25 MG tablet Take 6.25 mg by mouth 2 (Two) Times a Day. 1/19/19  Yes Edilson Cooper MD   dicyclomine (BENTYL) 20 MG tablet Take 20 mg by mouth 3 (Three) Times a Day With Meals. 3/27/19  Yes Edilson Cooper MD   Ferrous Sulfate 134 MG tablet Take 1 tablet by mouth Daily.   Yes Provider, MD Edilson   Fluticasone-Umeclidin-Vilant (TRELEGY ELLIPTA) 100-62.5-25 MCG/INH aerosol powder  Inhale 1 puff Daily.   Yes Edilson Cooper MD   furosemide (LASIX) 40 MG tablet Take 40 mg by mouth Daily. 5/2/18  Yes Edilson Cooper MD   guaiFENesin (MUCINEX) 600 MG 12 hr tablet Take 1,200 mg by mouth 2 (Two) Times a Day.   Yes Edilson Cooper MD   HYDROcodone-acetaminophen (NORCO) 5-325 MG per tablet Take 1 tablet by mouth Every 6 (Six) Hours As Needed. 1/19/19  Yes Edilson Cooper MD   HYDROcodone-acetaminophen (NORCO) 5-325 MG per tablet Take 1 tablet by mouth Every 4 (Four) Hours As Needed for Severe Pain . 5/31/19  Yes Josh Ramírez II, MD   ipratropium-albuterol (DUO-NEB) 0.5-2.5 mg/3 ml nebulizer Inhale 3 mL 4 (Four) Times a Day As Needed.   3/30/18  Yes Provider,  MD Edilson   losartan (COZAAR) 50 MG tablet Take 0.5 tablets by mouth Daily. 4/25/19  Yes Tyler Shaw MD   multivitamin (DAILY MARK) tablet tablet Take 1 tablet by mouth Daily. 2/22/17  Yes Provider, MD Edilson   nitroglycerin (NITROSTAT) 0.4 MG SL tablet Place 0.4 mg under the tongue Take As Directed. 2/28/18  Yes ProviderEdilson MD   pantoprazole (PROTONIX) 40 MG EC tablet Take 40 mg by mouth Daily. 3/30/18  Yes Provider, MD Edilson   potassium chloride (K-DUR) 10 MEQ CR tablet Take 10 mEq by mouth Daily. 2/3/19  Yes ProviderEdilson MD   predniSONE (DELTASONE) 20 MG tablet Take 20 mg by mouth Daily. MAINTENCE DOSE. 2/14/19  Yes ProviderEdilson MD   roflumilast (DALIRESP) 500 MCG tablet tablet Take 500 mcg by mouth Daily.   Yes Provider, MD Edilson   sertraline (ZOLOFT) 50 MG tablet Take 50 mg by mouth Daily.   Yes Provider, MD Edilson   apixaban (ELIQUIS) 5 MG tablet tablet Take 1 tablet by mouth Every 12 (Twelve) Hours. 5/2/19 6/12/19  Tyler Shaw MD   cefdinir (OMNICEF) 300 MG capsule Take 1 capsule by mouth 2 (Two) Times a Day. 4/25/19 6/12/19  Tyler Shaw MD   ·   · No Known Allergies  ·   · There is no immunization history on file for this patient.  Social History     Tobacco Use   • Smoking status: Former Smoker     Packs/day: 1.00     Years: 40.00     Pack years: 40.00     Types: Cigarettes   • Smokeless tobacco: Never Used   • Tobacco comment: QUIT 4 YRS AGO   Substance Use Topics   • Alcohol use: Yes     Comment: GLASS WINE NIGHT   ·    Social History     Substance and Sexual Activity   Drug Use No   ·     REVIEW OF SYSTEMS:  · Head: negative for headache  · Respiratory: negative for shortness of breath.   · Cardiovascular: negative for chest pain.   · Gastrointestinal: negative abdominal pain.   · Neurological: negative for LOC  · Psychiatric/Behavioral: negative for memory loss.   · All other systems reviewed and are negative    VITALS: BP  "141/69 (BP Location: Left arm, Patient Position: Lying)   Pulse 94   Temp 98.6 °F (37 °C) (Oral)   Resp 16   Ht 152.4 cm (60\")   Wt 50.8 kg (112 lb)   SpO2 100%   BMI 21.87 kg/m²   Body mass index is 21.87 kg/m².    PHYSICAL EXAM:   · CONSTITUTIONAL: A&Ox3, No acute distress  · LUNGS: Equal chest rise, no shortness of air  · CARDIOVASCULAR: palpable peripheral pulses  · SKIN: no skin lesions in the area examined  · LYMPH: no lymphadenopathy in the area examined  · EXTREMITY: Left Lower Extremity Her exam shows that her left leg is shortened and rotated.  Her hip is obviously dislocated.  She has pain with any range of motion.  Neurovascular exam is intact.    RADIOLOGY REVIEW:   Xr Hip With Or Without Pelvis 1 View Left    Result Date: 6/12/2019  1. Dislocated left hip prosthesis.  This report was finalized on 6/12/2019 12:49 PM by Dr. Frank Kolb M.D.        LABS:   Results for the past 24 hours:   Recent Results (from the past 24 hour(s))   Basic Metabolic Panel    Collection Time: 06/12/19  2:26 PM   Result Value Ref Range    Glucose 99 65 - 99 mg/dL    BUN 8 8 - 23 mg/dL    Creatinine 0.38 (L) 0.57 - 1.00 mg/dL    Sodium 144 136 - 145 mmol/L    Potassium 3.8 3.5 - 5.2 mmol/L    Chloride 90 (L) 98 - 107 mmol/L    CO2 40.9 (H) 22.0 - 29.0 mmol/L    Calcium 9.6 8.6 - 10.5 mg/dL    eGFR Non African Amer >150 >60 mL/min/1.73    BUN/Creatinine Ratio 21.1 7.0 - 25.0    Anion Gap 13.1 mmol/L   Type & Screen    Collection Time: 06/12/19  2:26 PM   Result Value Ref Range    ABO Type A     RH type Positive     Antibody Screen Negative     T&S Expiration Date 6/15/2019 11:59:59 PM    CBC Auto Differential    Collection Time: 06/12/19  2:26 PM   Result Value Ref Range    WBC 14.66 (H) 3.40 - 10.80 10*3/mm3    RBC 3.27 (L) 3.77 - 5.28 10*6/mm3    Hemoglobin 10.0 (L) 12.0 - 15.9 g/dL    Hematocrit 35.7 34.0 - 46.6 %    .2 (H) 79.0 - 97.0 fL    MCH 30.6 26.6 - 33.0 pg    MCHC 28.0 (L) 31.5 - 35.7 g/dL    RDW " 14.1 12.3 - 15.4 %    RDW-SD 57.6 (H) 37.0 - 54.0 fl    MPV 11.8 6.0 - 12.0 fL    Platelets 299 140 - 450 10*3/mm3    Neutrophil % 90.1 (H) 42.7 - 76.0 %    Lymphocyte % 6.0 (L) 19.6 - 45.3 %    Monocyte % 2.9 (L) 5.0 - 12.0 %    Eosinophil % 0.1 (L) 0.3 - 6.2 %    Basophil % 0.1 0.0 - 1.5 %    Immature Grans % 0.8 (H) 0.0 - 0.5 %    Neutrophils, Absolute 13.22 (H) 1.70 - 7.00 10*3/mm3    Lymphocytes, Absolute 0.88 0.70 - 3.10 10*3/mm3    Monocytes, Absolute 0.42 0.10 - 0.90 10*3/mm3    Eosinophils, Absolute 0.01 0.00 - 0.40 10*3/mm3    Basophils, Absolute 0.02 0.00 - 0.20 10*3/mm3    Immature Grans, Absolute 0.11 (H) 0.00 - 0.05 10*3/mm3    nRBC 0.0 0.0 - 0.2 /100 WBC   Scan Slide    Collection Time: 06/12/19  2:26 PM   Result Value Ref Range    Macrocytes Slight/1+ None Seen    WBC Morphology Normal Normal    Platelet Morphology Normal Normal       IMPRESSION:  Patient is a 74 y.o. Not  or  female with a dislocation of the left total hip replacement.  Previous radiographic evaluation including CT scan indicates that implants are proper positioned and oriented.  It is theorized that the etiology of her dislocation is poor tissue and muscle tension.  She has soft tissue and particularly muscular atrophy due to her long-standing pre-existing hip problems.    PLAN: This is going to be a difficult problem to fix.  Since implants are properly oriented, repositioning implants may not solve her problem.  Simply lengthening her leg is probably not enough to achieve stability.  Our surgical plan going in is to possibly revise the femur to a modular implant where we co dial in as much anteversion as needed.  In addition we may revise the cup to put in a dual mobility cup which would add a rotational interface that would increase stability.  Even with these changes, stability with soft tissue tension so poor, is not insured.  If we can get her to a 52 mm cup then after we achieve ingrowth we could put in a  constrained liner that is not an option for today (her current cup is 48 mm).    Goals details benefits and risks and surgical strategy have been discussed at length with the patient and family.    Josh Ramírez Sr., MD  Orthopaedic Surgery  [facility]

## 2019-06-12 NOTE — CONSULTS
Patient Care Team:  Rashi Ogden MD as PCP - General (Family Medicine)  Linda Conde MD as Consulting Physician (Obstetrics and Gynecology)  Frank Staton MD as Consulting Physician (Pulmonary Disease)  Ryan Diaz MD as Consulting Physician (Interventional Cardiology)      Subjective     Patient is a 74 y.o. female.  Asked to see postoperatively regarding COPD patient underwent a revision left total hip arthroplasty and open reduction of dislocated hip and repair of abductor mechanism today.  Patient has long-standing COPD she follows with Dr. Kasi Quinn of [facility].  She uses Trelegy and as needed albuterol nebulizer or MDI on average 2 or 3 times a day.  She also uses Roflumilast and prednisone 20 mg a day which she has been on for over 6 months.  She uses to and sometimes 3 L nasal cannula O2 continuously and at night she has some form of positive pressure ventilation for which she has a full facemask she does not know whether it is a BiPAP or a full noninvasive ventilator and she does not know the settings.  Furthermore she did not bring the machine with her to the hospital and nobody can bring it in.  She feels her breathing recently has been stable and postoperatively she feels it is doing pretty well she has a minimal chronic cough that is nonproductive he does get dyspneic with minimal exertion.  She has never been intubated but she has been in the hospital on noninvasive ventilation once in the past year or so.  She smoked for many years but she has not smoked for several years now she does not use alcohol or illicit drugs.  She does not recognize the term CO2 retention or carbon dioxide retention.  She has had coronary artery disease and had a four-vessel coronary bypass grafting she has had no recent chest pains pressure or tightness and no palpitations.  She is unaware of any valvular heart disease.      Review of Systems:  No seizures or strokes or  recent headaches or visual changes no difficulty swallowing bad heartburn or indigestion no history of ulcers or hepatitis no yellow jaundice no melena or hematochezia recent change in bowel habits no kidney disease dysuria hematuria urinary urgency or frequency no history of blood clots easy bleeding or bruising.  She denies hypertension or high cholesterol problems and those are listed in her past medical history.  She does have arthritis no diabetes or thyroid disease no polyuria polydipsia heat or cold intolerance      History  Past Medical History:   Diagnosis Date   • Anxiety and depression    • Arthritis    • Asthma    • COPD (chronic obstructive pulmonary disease) (CMS/[facility])    • Coronary artery disease    • History of anemia    • History of GI bleed    • History of MI (myocardial infarction)    • History of transfusion    • Hyperlipidemia    • Hypertension    • Left hip pain    • On home oxygen therapy    • PVD (peripheral vascular disease) (CMS/[facility])      Past Surgical History:   Procedure Laterality Date   • APPENDECTOMY     • CORONARY ARTERY BYPASS GRAFT  02/13/2017    4 VESSEL. ETOCH   • ELBOW OPEN REDUCTION INTERNAL FIXATION Left     WITH HARDWARE   • ENDOSCOPY      WITH CAUTERY OF BLEEDER   • FEMORAL ARTERY STENT      HISTORY OF GARY LEGS   • FEMORAL FEMORAL BYPASS     • HIP CLOSED REDUCTION Left 5/30/2019    Procedure: HIP CLOSED REDUCTION;  Surgeon: Josh Ramírez II, MD;  Location: [facility];  Service: Orthopedics   • HIP SURGERY Right     SPLIT FEMUR SURGERY   • LUMBAR FUSION     • ORIF WRIST FRACTURE Left     WITH HARDWARE   • ORIF WRIST FRACTURE Right    • TOTAL HIP ARTHROPLASTY Right    • TOTAL HIP ARTHROPLASTY Left 4/15/2019    Procedure: LEFT TOTAL HIP ARTHROPLASTY;  Surgeon: Josh Ramírez MD;  Location: [facility] OR;  Service: Orthopedics   • VASCULAR SURGERY      MULTIPLE STENTS PLAACED     Social History     Socioeconomic History   • Marital status:      Spouse  name: Not on file   • Number of children: Not on file   • Years of education: Not on file   • Highest education level: Not on file   Tobacco Use   • Smoking status: Former Smoker     Packs/day: 1.00     Years: 40.00     Pack years: 40.00     Types: Cigarettes   • Smokeless tobacco: Never Used   • Tobacco comment: QUIT 4 YRS AGO   Substance and Sexual Activity   • Alcohol use: Yes     Comment: GLASS WINE NIGHT   • Drug use: No     Family History   Problem Relation Age of Onset   • Obesity Other    • Malig Hyperthermia Neg Hx          Allergies:  Patient has no known allergies.    Medications:  Prior to Admission medications    Medication Sig Start Date End Date Taking? Authorizing Provider   albuterol sulfate  (90 Base) MCG/ACT inhaler Inhale 2 puffs Every 4 (Four) Hours As Needed for Shortness of Air.   Yes Edilson Cooper MD   ALPRAZolam (XANAX) 0.5 MG tablet Take 0.25 mg by mouth 3 (Three) Times a Day As Needed for Anxiety. 1/19/19  Yes Edilson Cooper MD   atorvastatin (LIPITOR) 10 MG tablet Take 10 mg by mouth Every Evening. 1/6/17  Yes Edilson Cooper MD   budesonide (PULMICORT) 0.5 MG/2ML nebulizer solution Inhale 0.5 mg 2 (Two) Times a Day. 11/14/18  Yes Edilson Cooper MD   busPIRone (BUSPAR) 10 MG tablet Take 10 mg by mouth 2 (Two) Times a Day.   Yes Edilson Cooper MD   CARAFATE 1 GM/10ML suspension Take 1 tablet by mouth 4 (Four) Times a Day After Meals & at Bedtime. 4/5/19  Yes Edilson Cooper MD   carvedilol (COREG) 6.25 MG tablet Take 6.25 mg by mouth 2 (Two) Times a Day. 1/19/19  Yes Edilson Cooper MD   dicyclomine (BENTYL) 20 MG tablet Take 20 mg by mouth 3 (Three) Times a Day With Meals. 3/27/19  Yes Edilson Cooper MD   Ferrous Sulfate 134 MG tablet Take 1 tablet by mouth Daily.   Yes Edilson Cooper MD   Fluticasone-Umeclidin-Vilant (TRELEGY ELLIPTA) 100-62.5-25 MCG/INH aerosol powder  Inhale 1 puff Daily.   Yes Edilson Cooper MD    furosemide (LASIX) 40 MG tablet Take 40 mg by mouth Daily. 5/2/18  Yes Edilson Cooper MD   guaiFENesin (MUCINEX) 600 MG 12 hr tablet Take 1,200 mg by mouth 2 (Two) Times a Day.   Yes Edilson Cooper MD   HYDROcodone-acetaminophen (NORCO) 5-325 MG per tablet Take 1 tablet by mouth Every 6 (Six) Hours As Needed. 1/19/19  Yes Edilson Cooper MD   HYDROcodone-acetaminophen (NORCO) 5-325 MG per tablet Take 1 tablet by mouth Every 4 (Four) Hours As Needed for Severe Pain . 5/31/19  Yes Josh Ramírez II, MD   ipratropium-albuterol (DUO-NEB) 0.5-2.5 mg/3 ml nebulizer Inhale 3 mL 4 (Four) Times a Day As Needed.   3/30/18  Yes Edilson Cooper MD   losartan (COZAAR) 50 MG tablet Take 0.5 tablets by mouth Daily. 4/25/19  Yes Tyler Shaw MD   multivitamin (DAILY MARK) tablet tablet Take 1 tablet by mouth Daily. 2/22/17  Yes ProviderEdilson MD   nitroglycerin (NITROSTAT) 0.4 MG SL tablet Place 0.4 mg under the tongue Take As Directed. 2/28/18  Yes Edilson Cooper MD   pantoprazole (PROTONIX) 40 MG EC tablet Take 40 mg by mouth Daily. 3/30/18  Yes ProviderEdilson MD   potassium chloride (K-DUR) 10 MEQ CR tablet Take 10 mEq by mouth Daily. 2/3/19  Yes Edilson Cooper MD   predniSONE (DELTASONE) 20 MG tablet Take 20 mg by mouth Daily. MAINTENCE DOSE. 2/14/19  Yes ProviderEdilson MD   roflumilast (DALIRESP) 500 MCG tablet tablet Take 500 mcg by mouth Daily.   Yes ProviderEdilson MD   sertraline (ZOLOFT) 50 MG tablet Take 50 mg by mouth Daily.   Yes ProviderEdilson MD   apixaban (ELIQUIS) 5 MG tablet tablet Take 1 tablet by mouth Every 12 (Twelve) Hours. 5/2/19 6/12/19  Tyler Shaw MD   cefdinir (OMNICEF) 300 MG capsule Take 1 capsule by mouth 2 (Two) Times a Day. 4/25/19 6/12/19  Tyler Shaw MD       ketorolac 15 mg Intravenous Q6H       lactated ringers 9 mL/hr Last Rate: 9 mL/hr (06/12/19 1528)       Objective     Vital  "Signs  Vital Sign Min/Max for last 24 hours  Temp  Min: 98.5 °F (36.9 °C)  Max: 99.1 °F (37.3 °C)   BP  Min: 137/66  Max: 180/87   Pulse  Min: 80  Max: 101   Resp  Min: 12  Max: 20   SpO2  Min: 96 %  Max: 100 %   Flow (L/min)  Min: 3  Max: 10   Weight  Min: 50.8 kg (112 lb)  Max: 50.8 kg (112 lb)     No intake or output data in the 24 hours ending 06/12/19 1908  No intake/output data recorded.  Last Weight and Admission Weight        06/12/19  1206   Weight: 50.8 kg (112 lb)     Flowsheet Rows      First Filed Value   Admission Height  152.4 cm (60\") Documented at 06/12/2019 1146   Admission Weight  50.8 kg (112 lb) Documented at 06/12/2019 1206          Body mass index is 21.87 kg/m².           Physical Exam:  General Appearance: Thin white female resting in bed on 3 L nasal cannula O2 with oxygen saturations of 95 to 98% does not appear in acute distress  Eyes: Conjunctiva are clear and anicteric, pupils are equal and reactive to light  ENT: Mucous membranes are moist no erythema or exudates she has a few missing teeth.  Nasal septum midline nasal mucosa is little dry  Neck: No adenopathy or thyromegaly no jugular venous distention or hepatojugular reflux trachea midline  Lungs: Very decreased throughout but no wheezes rales or rhonchi nonlabored symmetric expansion  Cardiac: Regular rate rhythm is a fairly loud at least 3/6 systolic murmur heard loudest in the right upper sternal border i.e. the aortic area  Abdomen: Soft no palpable organomegaly or masses  : Not examined  Musculoskeletal: She has an abductor pillow in her lower extremities  Skin: Very thin skin sort of the chronic steroid use type thin skin  Neuro: She is alert oriented cooperative following commands moving all extremities  Extremities/P Vascular: No clubbing no cyanosis no distal edema palpable radial and dorsalis pedis pulses bilaterally  MSE: Pleasant seems to be in good spirits      Labs:  Results from last 7 days   Lab Units " 06/12/19  1426   GLUCOSE mg/dL 99   SODIUM mmol/L 144   POTASSIUM mmol/L 3.8   CO2 mmol/L 40.9*   CHLORIDE mmol/L 90*   ANION GAP mmol/L 13.1   CREATININE mg/dL 0.38*   BUN mg/dL 8   BUN / CREAT RATIO  21.1   CALCIUM mg/dL 9.6   EGFR IF NONAFRICN AM mL/min/1.73 >150     Estimated Creatinine Clearance: 48 mL/min (A) (by C-G formula based on SCr of 0.38 mg/dL (L)).      Results from last 7 days   Lab Units 06/12/19  1426   WBC 10*3/mm3 14.66*   RBC 10*6/mm3 3.27*   HEMOGLOBIN g/dL 10.0*   HEMATOCRIT % 35.7   MCV fL 109.2*   MCH pg 30.6   MCHC g/dL 28.0*   RDW % 14.1   RDW-SD fl 57.6*   MPV fL 11.8   PLATELETS 10*3/mm3 299   NEUTROPHIL % % 90.1*   LYMPHOCYTE % % 6.0*   MONOCYTES % % 2.9*   EOSINOPHIL % % 0.1*   BASOPHIL % % 0.1   IMM GRAN % % 0.8*   NEUTROS ABS 10*3/mm3 13.22*   LYMPHS ABS 10*3/mm3 0.88   MONOS ABS 10*3/mm3 0.42   EOS ABS 10*3/mm3 0.01   BASOS ABS 10*3/mm3 0.02   IMMATURE GRANS (ABS) 10*3/mm3 0.11*   NRBC /100 WBC 0.0                             Microbiology Results (last 10 days)     ** No results found for the last 240 hours. **            Diagnostics:  Ct Pelvis Without Contrast    Result Date: 5/31/2019  CT PELVIS AND LEFT LOWER EXTREMITY WITHOUT CONTRAST  HISTORY: Evaluate anteversion of implants.  TECHNIQUE: CT includes axial imaging through the pelvis and femora and knees without contrast. Data reconstructed in coronal and sagittal planes. Original data include imaging of the pelvis and knees with 2 separate fields of view and the patient was brought back for further imaging 05/31/2019 including single data set through the pelvis, femora and knees.  FINDINGS: Bilateral total hip arthroplasties are present.  There is associated beam-hardening artifact limiting evaluation. Anteversion was measured comparing the axis of the prosthetic femoral neck with the femoral condylar axis. Anteversion measures approximately 1 degree on the right and 17 degrees on the left.  Three cerclage wires encircle a  healing fracture of the right proximal femur. There is osteolucency within the right supra-acetabular region without expansion or fracture. On the left, there is heterotopic ossification extending lateral to the left acetabular and supra-acetabular region as well as along the anterior left hip capsule. A left hip joint effusion is present with distention of pseudocapsule. Joint fluid posterior to the left femoral neck measures 5.4 cm in AP dimension and wraps about the left greater trochanter along the deep margin of the left gluteus odalis muscle.  There has been posterolateral uncemented fusion within the lumbar spine with placement of rods and bilateral pedicle screws at L4-L5-S1. Atherosclerotic calcifications are present. There is a graft extending from the distal abdominal aorta along the right iliac artery. Femoral-femoral bypass graft is present.      1.  Femoral anteversion measuring approximately 1 degree on the right and 17 degrees on the left. 2.  Left total hip arthroplasty with hip joint effusion with distention of the pseudocapsule. Arthrocentesis could be performed if there is suspicion for infection. Fluid density extends posterior to the left femoral neck and wraps about the left greater trochanter deep to the left gluteus odalis muscle. Heterotopic ossification along the left acetabular and supra-acetabular region. 3. Right total hip arthroplasty. Cerclage wires encircle a healing fracture of the right proximal femur. Osteolucency right supra-acetabular region without expansion.  This may have been present prior to the arthroplasty though could represent osteolysis.  Radiation dose reduction techniques were utilized, including automated exposure control and exposure modulation based on body size.  This report was finalized on 5/31/2019 1:51 PM by Dr. Contreras Ocampo M.D.      Ct Lower Extremity Left Without Contrast    Result Date: 5/31/2019  CT PELVIS AND LEFT LOWER EXTREMITY WITHOUT CONTRAST   HISTORY: Evaluate anteversion of implants.  TECHNIQUE: CT includes axial imaging through the pelvis and femora and knees without contrast. Data reconstructed in coronal and sagittal planes. Original data include imaging of the pelvis and knees with 2 separate fields of view and the patient was brought back for further imaging 05/31/2019 including single data set through the pelvis, femora and knees.  FINDINGS: Bilateral total hip arthroplasties are present.  There is associated beam-hardening artifact limiting evaluation. Anteversion was measured comparing the axis of the prosthetic femoral neck with the femoral condylar axis. Anteversion measures approximately 1 degree on the right and 17 degrees on the left.  Three cerclage wires encircle a healing fracture of the right proximal femur. There is osteolucency within the right supra-acetabular region without expansion or fracture. On the left, there is heterotopic ossification extending lateral to the left acetabular and supra-acetabular region as well as along the anterior left hip capsule. A left hip joint effusion is present with distention of pseudocapsule. Joint fluid posterior to the left femoral neck measures 5.4 cm in AP dimension and wraps about the left greater trochanter along the deep margin of the left gluteus odalis muscle.  There has been posterolateral uncemented fusion within the lumbar spine with placement of rods and bilateral pedicle screws at L4-L5-S1. Atherosclerotic calcifications are present. There is a graft extending from the distal abdominal aorta along the right iliac artery. Femoral-femoral bypass graft is present.      1.  Femoral anteversion measuring approximately 1 degree on the right and 17 degrees on the left. 2.  Left total hip arthroplasty with hip joint effusion with distention of the pseudocapsule. Arthrocentesis could be performed if there is suspicion for infection. Fluid density extends posterior to the left femoral neck and  wraps about the left greater trochanter deep to the left gluteus odalis muscle. Heterotopic ossification along the left acetabular and supra-acetabular region. 3. Right total hip arthroplasty. Cerclage wires encircle a healing fracture of the right proximal femur. Osteolucency right supra-acetabular region without expansion.  This may have been present prior to the arthroplasty though could represent osteolysis.  Radiation dose reduction techniques were utilized, including automated exposure control and exposure modulation based on body size.  This report was finalized on 5/31/2019 1:51 PM by Dr. Contreras Ocampo M.D.      Xr Hip With Or Without Pelvis 1 View Left    Result Date: 6/12/2019  XR HIP W OR WO PELVIS 1 VIEW LEFT-  06/12/2019  HISTORY: Pain. Dislocation.  There is dislocation of the left hip prosthesis with superior lateral displacement of the proximal femoral component with respect to the acetabular component.. No fractures are seen. Lower lumbar fusion hardware is seen.      1. Dislocated left hip prosthesis.  This report was finalized on 6/12/2019 12:49 PM by Dr. Frank Kolb M.D.      Xr Hip With Or Without Pelvis 1 View Left    Result Date: 5/31/2019  ONE VIEW LEFT HIP  CLINICAL HISTORY: Post reduction.  COMPARISON: 8:25 PM.  FINDINGS: Single AP view of the left hip shows anatomic alignment regarding the previously dislocated hip arthroplasty. No fracture identified in the AP projection.        Normal alignment in the AP projection.  This report was finalized on 5/31/2019 5:21 AM by Williams Chow M.D.      Xr Hip With Or Without Pelvis 2 - 3 View Left    Result Date: 5/30/2019  PELVIS AND LEFT HIP X-RAYS  CLINICAL HISTORY: Left hip pain.  An AP view the pelvis and AP view of the left hip demonstrate a dislocated left total hip arthroplasty with superior subluxation of the femoral component with respect to the acetabular component. There is no evidence of loosening or fracture. A right total  hip arthroplasty appears intact.  This report was finalized on 5/30/2019 8:29 PM by Dr. Gideon Ceballos M.D.               Assessment/Plan     1. Status post 6/12/2019 vision left total hip arthroplasty and open reduction of dislocated hip and repair of abductor mechanism.  2. COPD sounds like she is got very severe disease she is on chronic high-dose systemic steroids I agree with continuing those I will cover her with bronchodilators we do not have the one she uses at home on formulary but we can come close to that regimen.  3. Chronic hypoxic respiratory failure I am very suspicious that she also has chronic hypercapnia given her serum bicarbonate of 42 at admission.  Additional to supplemental O2 she uses some form of positive airway pressure at night and has no history of sleep apnea therefore I suspect it is for chronic hypercapnic respiratory failure.  This certainly makes her at higher risk.  I am going to check an arterial blood gas to see where her PCO2 and pH are I will order a noninvasive ventilator of some type once we get those gases and I can choose appropriately should use this with sleep and PRN.  We also have to be very careful not to turn her O2 up to high and decrease her respiratory drive.  4. Coronary artery disease  5. Heart murmur in the aortic area      Trevor Edward MD  06/12/19  7:08 PM    Time:

## 2019-06-12 NOTE — OP NOTE
Orthopaedic Surgery   Left Total Hip  Dr. Josh Ramírez   [phone]    Patient Name:  Maryjo Wynn  YOB: 1945  Medical Records Number:  [id number]    Date of Procedure:  6/12/2019    Pre-operative Diagnosis: Status post left total hip replacement.  Recurrent instability with her second posterior dislocation today.    Post-operative Diagnosis: Same.  Etiology was discovered to be avulsion of the abductor musculature.  As determined on her preop CT scan, original implants were well-positioned.    Procedure Performed: Revision left Total Hip Arthroplasty and open reduction of dislocated hip.  Repair of abductor mechanism.                                          Layered Closure    Implants:     Implant Name Type Inv. Item Serial No.  Lot No. LRB No. Used Action   STEM FEM DIST RESTR/MOD CONICL STR [id number] - [id number] Implant STEM FEM DIST RESTR/MOD CONICL STR [id number]  [employer] [id number] Left 1 Implanted   CONE BDY HIP RESTOR MOD PROX 25 PLS0 - [id number] Implant CONE BDY HIP RESTOR MOD PROX 25 PLS0  [employer] [id number] Left 1 Implanted   HD FEM BIOLOXDELTA V40 CERAM 32MM - [id number] Implant HD FEM BIOLOXDELTA V40 CERAM 32MM  [employer] [id number] Left 1 Implanted   SUT FW 5 W .5 CIR CUT NDL 48M [id number] - [id number] Implant SUT FW 5 W .5 CIR CUT NDL 48M [id number]  ARTHREX [id number] Left 3 Implanted   SUT FW #2 W/TPR NDL 1/2 CIR 38IN 97CM 26.5MM ELFEGO - [id number] Implant SUT FW #2 W/TPR NDL 1/2 CIR 38IN 97CM 26.5MM ELFEGO  ARTHREX [id number] Left 2 Implanted   HD FEM OXINIUM TPR 12 14 32MM PLS0 - [id number] Implant HD FEM OXINIUM TPR 12 14 32MM PLS0  [employer] [id number] Left 1 Explanted   STEM FEM/HIP POLARSTEM CMTLESS LAT CCD 126D SZ2 - [id number] Implant STEM FEM/HIP POLARSTEM CMTLESS LAT CCD 126D SZ2  [employer] [id number] Left 1 Explanted       Implant system utilized: Snyppit restoration modular hip system femur 155 mm length 16 mm distal diameter.  Proximal body was 25 mm  height of +0.  Femoral neck head was +0 length 32 mm ceramic  Surgeon:  Josh Ramírez MD    Assistant:  I utilized service of an assistant surgeon, specifically Dr. Solo Ramírez. Assistant surgeon participated in crucial portion of the operation. Use of the assistant surgeon greatly reduced overall operative time, thus significantly reducing overall morbidity for the patient.     Anesthetic Type:  General    Estimated Blood Loss:   300    Specimens:   Order Name Source Comment Collection Info Order Time   ANAEROBIC CULTURE Hip, Left  Collected By: Josh Ramírez MD 6/12/2019  4:41 PM   WOUND CULTURE Hip, Left  Collected By: Josh Ramírez MD 6/12/2019  4:41 PM       Complications:  None    Tranexamic acid: Was given IV at the beginning of the case       Quality Measures:I have documented the patient's current medications including dosage, frequency, and route of administration. We discussed conservative alternatives during the decision-making process prior to the procedure. Patient was evaluated immediately preoperatively for cardiovascular and thromboembolic risk factors.  She has no acute risk factors.  Her biggest risk factor is her oxygen and steroid-dependent COPD. Prophylactic IV antibiotics were administered before the beginning of the case.       Procedure Performed:  Patient was turned with the left hip up in the lateral position.  After a prep and drape we utilized the previous posterior lateral incision.  Dissection was taken through the gluteus muscle in line with the fibers.  Hematomas are present.  This was evacuated and cultured.  The hip was internally rotated.  The first thing we noticed was a avulsion of the abductor musculature.  It is theorized that this was the cause of her instability.  Her bone is extremely osteoporotic and her tissues are fragile making her susceptible to this pathology.  I internally rotated the femur while protecting the sciatic nerve.  The extractor device was  placed in her femoral component which was fairly easily removed.  It was not ingrown.  I examined her cup which seemed to be well fixed.  Attention was turned back to the femur.  We reamed the femur appropriately for the Martha restoration modular hip system with sizes as noted above.  The distal body was implanted.  I then did a trial reduction with the modular proximal body adjusting anteversion and height to various degrees.  Ultimately at about 60 degrees of anteversion and lengthening her over a centimeter I found her hip exceedingly stable.  This was in spite of the fact that her abductor was avulsed.    At this point I needed to side of the cup should be revised as well.  The potential advantages of revising the cup would be to get to a bigger diameter and also get to a dual mobility construct.  The risks included damaging her acetabular bone stock trying to get out what seemed to be a well fixed cup.  Her bone is known to be extremely fragile and osteoporotic.  After an assessment of the situation it was determined that the lowest risk would be leave her cup in situ and accept her degree of anteversion measured on her trial reduction.  I observed that the hip was extremely stable with this construct.    Thus the [place] proximal body as noted above was impacted into the proximal femur at about 60 degrees of anteversion.  Final stability and leg length was determined and we implanted the 32 mm ceramic head with a +0 neck length.    The next task was to repair her abductor mechanism.  Multiple #2 FiberWire and #5 FiberWire sutures were placed through the abductor and through the bone.  Her bone was so fragile and would not hold sutures well.  Ultimately we were able to repair most of the abductor though due to the fragility of her tissues the repair was somewhat tenuous.  There was simply nothing that could be done to augment this however.     The wound and fascia was injected with my Exparel solution  containing 20 cc Exparel, 25 cc quarter percent bupivacaine with 20 cc of saline.  Care was taken to protect sciatic nerve and other neurovascular structures.  I repaired the fascia with #1 Vicryl.  We tried to incorporate the abductor mechanism into the fascia to give it further stability.  The subtendinous tissue was closed with 2-0 Vicryl and staples were placed in the skin.  A wound VAC dressing was applied.  We did use vancomycin powder in the wound to try to further reduce the risk of infection.  There were no complications..      Josh Ramírez MD  6/12/2019  6:11 PM

## 2019-06-12 NOTE — ANESTHESIA PROCEDURE NOTES
Airway  Urgency: elective      General Information and Staff    Patient location during procedure: OR  Anesthesiologist: Jill Negron MD    Indications and Patient Condition  Indications for airway management: airway protection    Preoxygenated: yes  Mask difficulty assessment: 1 - vent by mask    Final Airway Details  Final airway type: endotracheal airway      Successful airway: ETT  Cuffed: yes   Successful intubation technique: video laryngoscopy  Facilitating devices/methods: intubating stylet  Endotracheal tube insertion site: oral  Blade: CMAC  Blade size: 3  ETT size (mm): 7.5  Cormack-Lehane Classification: grade I - full view of glottis  Placement verified by: chest auscultation and capnometry   Measured from: gums  Number of attempts at approach: 1    Additional Comments  H/o difficult intubation => used cmac for an atraumatic intubation as charted above

## 2019-06-12 NOTE — ED NOTES
Patient reports sitting in chair reaching over the her right for an item, feeling a pop in her left hip similar to the last time it popped out of socket approx 2 weeks ago. Patient has shortening of LLE with internal rotation.     Daniel Summers RN  06/12/19 1206       Daniel Summers RN  06/12/19 1211

## 2019-06-12 NOTE — ANESTHESIA POSTPROCEDURE EVALUATION
Patient: Maryjo Wynn    Procedure Summary     Date:  06/12/19 Room / Location:  [facility] OR [address] [place] [address] MAIN OR    Anesthesia Start:  1559 Anesthesia Stop:  1809    Procedure:  LT. HIP OPEN REDUCTION REVISION (Left Hip) Diagnosis:      Surgeon:  Josh Ramírez MD Provider:  Jill Negron MD    Anesthesia Type:  general ASA Status:  4          Anesthesia Type: general  Last vitals  BP   135/64 (06/12/19 1915)   Temp   37.3 °C (99.1 °F) (06/12/19 1807)   Pulse   98 (06/12/19 1915)   Resp   20 (06/12/19 1915)     SpO2   94 % (06/12/19 1915)     Post Anesthesia Care and Evaluation    Patient location during evaluation: bedside  Pain management: adequate  Airway patency: patent  Anesthetic complications: No anesthetic complications    Cardiovascular status: acceptable  Respiratory status: acceptable  Hydration status: acceptable

## 2019-06-12 NOTE — ED PROVIDER NOTES
"EMERGENCY DEPARTMENT ENCOUNTER    Room Number:  42/42  Date seen:  6/12/2019  Time seen: 12:09 PM  PCP: Rashi Ogden MD  Historian: Pt  Ortho Dr. Ramírez    HPI:  Chief Complaint: Left hip pain  Context: Maryjo Wynn is a 74 y.o. female who presents to the ED c/o constant left hip pain that began just PTA. Pt states she thinks she has dislocated her hip. Pt states she was sitting in chair and when she reach down to pick something up she felt a \"pop\" in her left hip. Pt had a left hip replacement on 4/15/19 and was seen in the ED for dislocation of the prosthesis on 5/30/19. She also c/o worsening swelling in her feet. She denies fever, CP, SOA, abd pain, N/V/D, and weakness. She is on 3L O2 at home. Pt has a h/o COPD, CAD, MI, HTN, HLD, and anemia. Pt is not on blood thinners.     Duration:  Just PTA  Onset: sudden  Timing: constant   Location: left hip  Radiation: none  Quality: pain  Intensity/Severity: moderate   Progression: worsening   Associated Symptoms: bilateral foot swelling   Aggravating Factors: bending over while sitting and felt pop in hip  Alleviating Factors: none  Previous Episodes: Pt had a left hip replacement on 4/15/19 and was seen in the ED for dislocation of the prosthesis on 5/30/19.  Treatment before arrival: none        MEDICAL RECORD REVIEW    Pt had left hip replacement on 4/15/19. Pt was seen in ED on 5/30/19 with dislocation of left hip prosthesis.           ALLERGIES  Patient has no known allergies.    PAST MEDICAL HISTORY  Active Ambulatory Problems     Diagnosis Date Noted   • Degenerative joint disease (DJD) of hip 04/15/2019   • PNA (pneumonia) 04/20/2019   • COPD (chronic obstructive pulmonary disease) (CMS/[facility]) 04/20/2019   • Acute on chronic respiratory failure with hypoxia and hypercapnia (CMS/[facility]) 04/20/2019   • Chronic diastolic CHF (congestive heart failure) (CMS/Formerly [facility]) 04/20/2019   • CAD (coronary artery disease) 04/20/2019   • Asymptomatic carotid artery stenosis " 09/28/2016   • Atherosclerosis of native artery of both lower extremities with intermittent claudication (CMS/Formerly [facility]) 09/28/2016   • CAD (coronary artery disease), autologous vein bypass graft 02/28/2018   • Chronic hypoxemic respiratory failure (CMS/Former[facility]) 04/29/2018   • Echocardiogram abnormal 03/20/2018   • Fusion of spine of lumbar region 10/17/2012   • GI bleed 12/30/2016   • History of fusion of lumbar spine 01/09/2013   • Hx of CABG 04/21/2019   • Postoperative atrial fibrillation (CMS/[facility]) 03/20/2018   • Paroxysmal atrial fibrillation (CMS/[facility]) 04/21/2019   • PUD (peptic ulcer disease) 04/29/2018   • PVD (peripheral vascular disease) (CMS/[facility]) 03/19/2018   • S/P femoral-femoral bypass surgery 12/29/2016   • Statin intolerance 03/20/2018   • TR (tricuspid regurgitation) 04/21/2019   • Superior mesenteric artery stenosis (CMS/HCC) 04/21/2019   • Closed dislocation of left hip (CMS/HCC) 05/30/2019     Resolved Ambulatory Problems     Diagnosis Date Noted   • No Resolved Ambulatory Problems     Past Medical History:   Diagnosis Date   • Anxiety and depression    • Arthritis    • Asthma    • COPD (chronic obstructive pulmonary disease) (CMS/[facility])    • Coronary artery disease    • History of anemia    • History of GI bleed    • History of MI (myocardial infarction)    • History of transfusion    • Hyperlipidemia    • Hypertension    • Left hip pain    • On home oxygen therapy    • PVD (peripheral vascular disease) (CMS/[facility])        PAST SURGICAL HISTORY  Past Surgical History:   Procedure Laterality Date   • APPENDECTOMY     • CORONARY ARTERY BYPASS GRAFT  02/13/2017    4 VESSEL. ETOCH   • ELBOW OPEN REDUCTION INTERNAL FIXATION Left     WITH HARDWARE   • ENDOSCOPY      WITH CAUTERY OF BLEEDER   • FEMORAL ARTERY STENT      HISTORY OF GARY LEGS   • FEMORAL FEMORAL BYPASS     • HIP CLOSED REDUCTION Left 5/30/2019    Procedure: HIP CLOSED REDUCTION;  Surgeon: Josh Ramírez II, MD;  Location: [facility] OR;   Service: Orthopedics   • HIP SURGERY Right     SPLIT FEMUR SURGERY   • LUMBAR FUSION     • ORIF WRIST FRACTURE Left     WITH HARDWARE   • ORIF WRIST FRACTURE Right    • TOTAL HIP ARTHROPLASTY Right    • TOTAL HIP ARTHROPLASTY Left 4/15/2019    Procedure: LEFT TOTAL HIP ARTHROPLASTY;  Surgeon: Josh Ramírez MD;  Location: [facility] OR;  Service: Orthopedics   • VASCULAR SURGERY      MULTIPLE STENTS PLAACED       FAMILY HISTORY  Family History   Problem Relation Age of Onset   • Obesity Other    • Malig Hyperthermia Neg Hx        SOCIAL HISTORY  Social History     Socioeconomic History   • Marital status:      Spouse name: Not on file   • Number of children: Not on file   • Years of education: Not on file   • Highest education level: Not on file   Tobacco Use   • Smoking status: Former Smoker     Packs/day: 1.00     Years: 40.00     Pack years: 40.00     Types: Cigarettes   • Smokeless tobacco: Never Used   • Tobacco comment: QUIT 4 YRS AGO   Substance and Sexual Activity   • Alcohol use: Yes     Comment: GLASS WINE NIGHT   • Drug use: No           REVIEW OF SYSTEMS  Review of Systems   Constitutional: Negative for fever.   HENT: Negative for sore throat.    Eyes: Negative.    Respiratory: Positive for shortness of breath (chronic). Negative for cough.    Cardiovascular: Positive for leg swelling (bilateral feet). Negative for chest pain.   Gastrointestinal: Negative for abdominal pain, diarrhea and vomiting.   Genitourinary: Negative for dysuria.   Musculoskeletal: Positive for arthralgias (left hip). Negative for neck pain.   Skin: Negative for rash.   Neurological: Negative for weakness, numbness and headaches.   Hematological: Negative.    Psychiatric/Behavioral: Negative.    All other systems reviewed and are negative.          PHYSICAL EXAM  ED Triage Vitals   Temp Heart Rate Resp BP SpO2   06/12/19 1146 06/12/19 1203 06/12/19 1146 06/12/19 1203 06/12/19 1203   98.5 °F (36.9 °C) 101 20 152/79 99 %       Temp src Heart Rate Source Patient Position BP Location FiO2 (%)   06/12/19 1146 -- -- -- --   Tympanic         Physical Exam   Constitutional: She is oriented to person, place, and time. No distress.   HENT:   Head: Normocephalic and atraumatic.   Eyes: EOM are normal. Pupils are equal, round, and reactive to light.   Neck: Normal range of motion. Neck supple.   Cardiovascular: Normal rate, regular rhythm and normal heart sounds.   Pulmonary/Chest: Effort normal. No respiratory distress. She has wheezes.   Abdominal: Soft. There is no tenderness. There is no rebound and no guarding.   Musculoskeletal: She exhibits edema (3+  pedal edema).        Left hip: She exhibits decreased range of motion and deformity (internally rotated and shortened).   Neurological: She is alert and oriented to person, place, and time. She has normal sensation and normal strength.   Skin: Skin is warm and dry. No rash noted.   Psychiatric: Mood and affect normal.   Nursing note and vitals reviewed.    LAB RESULTS  Lab Results (last 24 hours)     ** No results found for the last 24 hours. **        I ordered the above labs and reviewed the results.    RADIOLOGY  XR Hip With or Without Pelvis 1 View Left   Final Result   1. Dislocated left hip prosthesis.       This report was finalized on 6/12/2019 12:49 PM by Dr. Frank Kolb M.D.              I ordered the above noted radiological studies and reviewed the images on the PACS system.          MEDICATIONS GIVEN IN ER  Medications   sodium chloride 0.9 % flush 10 mL (not administered)   morphine injection 2 mg (2 mg Intravenous Given 6/12/19 1224)   ondansetron (ZOFRAN) injection 4 mg (4 mg Intravenous Given 6/12/19 1223)   morphine injection 2 mg (2 mg Intravenous Given 6/12/19 1359)               PROCEDURES  Procedures          PROGRESS AND CONSULTS   12:15 PM  XR left hip ordered for evaluation. Zofran and Morphine ordered for pain.     12:54 PM  Reviewed pt's history and  "workup with Dr. Mckenzie (ER physician).  After a bedside evaluation, Dr. Mckenzie agrees with the plan of care.    12:59 PM  Consult placed to ortho.    1:06 PM  Discussed pt case with dino Sevilla. He will discuss with partner and call me back.     1:43 PM  Rechecked pt who is resting in NAD. Informed pt of prothesis dislocation seen on XR. Pt c/o pain. Morphine ordered.     1:49 PM  Discussed pt case with dino Chavez. He will take pt to surgery to repair the left hip dislocation.     2:04 PM   Informed pt of plan for surgery. Pt understands and agrees with the plan, all questions answered.    2:21 PM  Labs ordered per Dr. Ramírez. Pt will have a surgical revision as well as dislocation repair. Informed pt.       Disposition vitals:  /66   Pulse 85   Temp 98.5 °F (36.9 °C) (Tympanic)   Resp 20   Ht 152.4 cm (60\")   Wt 50.8 kg (112 lb)   SpO2 100%   BMI 21.87 kg/m²           DIAGNOSIS  Final diagnoses:   Dislocation of left hip, initial encounter (CMS/[facility])           DISPOSITION  SEND TO OR    Discussed treatment plan and reason for surgery with pt/family and admitting physician.  Pt/family voiced understanding of the plan for admission for further testing/treatment as needed.                 Documentation assistance provided by rachel Marin for Kvng uCi PA-C.  Information recorded by the rachel was done at my direction and has been verified and validated by me.             Karma Marin  06/12/19 0610       Kvng Cui PA  06/12/19 2203    "

## 2019-06-12 NOTE — ANESTHESIA PREPROCEDURE EVALUATION
Anesthesia Evaluation     Patient summary reviewed and Nursing notes reviewed                Airway   Mallampati: II  Dental      Pulmonary    (+) pneumonia , COPD severe, asthma,   Cardiovascular     ECG reviewed  Rhythm: irregular  Rate: normal    (+) hypertension, valvular problems/murmurs TI, CAD, CABG, dysrhythmias Paroxysmal Atrial Fib, CHF, PVD, hyperlipidemia,  carotid artery disease      Neuro/Psych  (+) psychiatric history Anxiety,     GI/Hepatic/Renal/Endo    (+)  PUD, GI bleeding,     Musculoskeletal     Abdominal    Substance History - negative use     OB/GYN negative ob/gyn ROS         Other   (+) arthritis                   Anesthesia Plan    ASA 4     general   (Home oxygen and CPAP  Medical pathos increasing alexsandra op risks    Previous intubation at [facility] was with video laryngoscopy  X 1)  intravenous induction   Anesthetic plan, all risks, benefits, and alternatives have been provided, discussed and informed consent has been obtained with: patient.

## 2019-06-12 NOTE — OUTREACH NOTE
General Surgery Week 2 Survey      Responses   Facility patient discharged from?  [facility]   Does the patient have one of the following disease processes/diagnoses(primary or secondary)?  General Surgery   Week 2 attempt successful?  No   Unsuccessful attempts  Attempt 1          Anna Bill RN

## 2019-06-13 NOTE — PROGRESS NOTES
Jose Tomlinson MD                          [phone]      Patient ID:    Name:  Maryjo Wynn    MRN:  [id number]    1945   74 y.o.  female            Patient Care Team:  Rashi Ogden MD as PCP - General (Family Medicine)  Linda Conde MD as Consulting Physician (Obstetrics and Gynecology)  Frank Staton MD as Consulting Physician (Pulmonary Disease)  Ryan Diaz MD as Consulting Physician (Interventional Cardiology)    CC/ Reason for visit: Per Assessment mentioned below    Subjective: Pt seen and examined this AM. No acute overnight events noted. Doing better.  States that she did not like the mask pressure yesterday and feels that she would be able to tolerate it if it is at the lower pressure.  She is already used using APAP device at night.  She is unclear about the device and its purpose.    ROS: Denies any subjective fevers, chest pain, nausea/ vomiting.  No new worsening cough or shortness of breath    Objective     Vital Signs past 24hrs    BP range: BP: (106-180)/(60-92) 146/65  Pulse range: Heart Rate:  [] 96  Resp rate range: Resp:  [12-20] 16  Temp range: Temp (24hrs), Av.9 °F (36.6 °C), Min:97.3 °F (36.3 °C), Max:99.1 °F (37.3 °C)      Ventilator/Non-Invasive Ventilation Settings (From admission, onward)    Start     Ordered    19  NIPPV (CPAP or BIPAP)  At Bedtime & As Needed-RT     Question Answer Comment   Indication: Acute Respiratory Failure    Type: AVAPS/PC/PS    NIPPV Mask Interface: Full Face Mask    Backup Rate 16    Target VT (mL) 500    EPAP/PEEP (cm H2O) 5    Min Pressure (cm H2O) 12    Max Pressure (cm H2O) 28    Titrate for SPO2 90%        19          Device (Oxygen Therapy): nasal cannula       50.8 kg (112 lb); Body mass index is 21.87 kg/m².      Intake/Output Summary (Last 24 hours) at 2019 1713  Last data filed at 2019 1010  Gross per 24 hour   Intake 1858 ml    Output 100 ml   Net 1758 ml       PHYSICAL EXAM   Constitutional: Middle aged pt in chair, No acute respiratory distress, + accessory muscle use  Head: - NCAT  Eyes: No pallor, Anicteric conjunctiva, EOMI.  ENMT:  Mallampati 4, no oral thrush. No palpable cervical lymphadenopathy, Dry MM.   Heart: RRR, no murmur. No pedal edema   Lungs: ASHLEY +, Distant BS, No wheezes/ crackles heard    Abdomen: Soft. No tenderness, guarding or rigidity. No palpable masses  Extremities: Extremities warm and well perfused. Surgical changes @ hip.  Neuro: Conscious, answers appropriately, no gross focal neuro deficits  Psych: Mood and affect appropriate    Scheduled meds:    arformoterol 15 mcg Nebulization BID - RT   aspirin 81 mg Oral Daily   budesonide 0.5 mg Nebulization BID - RT   busPIRone 10 mg Oral BID   carvedilol 6.25 mg Oral BID   dicyclomine 10 mg Oral TID   ferrous sulfate 325 mg Oral Daily With Breakfast   furosemide 40 mg Oral Daily   guaiFENesin 1,200 mg Oral Q12H   losartan 25 mg Oral Daily   pantoprazole 40 mg Oral Q AM   potassium chloride 20 mEq Oral BID   predniSONE 20 mg Oral Daily   roflumilast 500 mcg Oral Daily   sertraline 50 mg Oral Daily   sucralfate 500 mg Oral 4x Daily PC & at Bedtime   tiotropium bromide monohydrate 2 puff Inhalation Daily - RT       IV meds:                           Data Review:      Results from last 7 days   Lab Units 06/13/19  0423 06/12/19  1426   SODIUM mmol/L 130* 144   POTASSIUM mmol/L 4.1 3.8   CHLORIDE mmol/L 87* 90*   CO2 mmol/L 33.9* 40.9*   BUN mg/dL 8 8   CREATININE mg/dL 0.38* 0.38*   CALCIUM mg/dL 8.4* 9.6   GLUCOSE mg/dL 91 99   WBC 10*3/mm3  --  14.66*   HEMOGLOBIN g/dL 8.1* 10.0*   PLATELETS 10*3/mm3  --  299       Lab Results   Component Value Date    CALCIUM 8.4 (L) 06/13/2019       Results from last 7 days   Lab Units 06/12/19  1637   WOUNDCX  No growth       Results from last 7 days   Lab Units 06/12/19  2251   PH, ARTERIAL pH units 7.324*   PO2 ART mm Hg 115.5*    PCO2, ARTERIAL mm Hg 87.0*   HCO3 ART mmol/L 45.2*        Results Review:    I have reviewed the relevant laboratory results and reviewed the chest imaging from the last 3 days personally and summarized it below    Assessment    S/p Lt Revision THR 6/12   COPD not in exac   Chronic high-dose steroids  Chronic hypoxemic respiratory failure  Chronic hypercapnic respiratory failure  CAD    PLAN:  Continue with the current nebulizer regimen.  Would recommend weaning down steroids slowly as an outpatient and follow-up in the clinic to see if he can wean steroids down.  Patient on supplemental oxygen at baseline levels.  ABG confirms chronic hypercapnia and need for volume assured ventilation  Patient has a home CPAP device and I have asked her to get it to the hospital.  For now will use AVAPS on a V60.  Did not like the pressure yesterday so I will decrease the tidal volume and pressures appropriately    I have discussed my findings and recommendations with patient, family and nursing staff.     Jose Tomlinson MD  6/13/2019

## 2019-06-13 NOTE — PLAN OF CARE
Problem: Patient Care Overview  Goal: Plan of Care Review  Outcome: Ongoing (interventions implemented as appropriate)   06/13/19 6846   Coping/Psychosocial   Plan of Care Reviewed With patient   OTHER   Outcome Summary Pt presents s/p L RIVKA revision and open reduction in addition to repair of abductor mechanism recurrent hip dislocation. This visit she walked almost to the door and did RIVKA protocol exercises and tolerated with mild-mod complaints of pain. PLOF is independent, lives with spouse, has equipment from previous hip surgical procedures and has 2 steps to enter. Plan is to d/c home with assist/HH.

## 2019-06-13 NOTE — DISCHARGE PLACEMENT REQUEST
"Maryjo Wynn (74 y.o. Female)     Date of Birth Social Security Number Address Home Phone MRN    1945  [facility] [place] [id number] [phone] [id number]    Sikhism Marital Status          Adventism        Admission Date Admission Type Admitting Provider Attending Provider Department, Room/Bed    6/12/19 Emergency Josh Ramírez MD Sweet, Richard A, MD [address], [address]/1    Discharge Date Discharge Disposition Discharge Destination                       Attending Provider:  Josh Ramírez MD    Allergies:  No Known Allergies    Isolation:  None   Infection:  None   Code Status:  CPR    Ht:  152.4 cm (60\")   Wt:  50.8 kg (112 lb)    Admission Cmt:  None   Principal Problem:  None                Active Insurance as of 6/12/2019     Primary Coverage     Payor Plan Insurance Group Employer/Plan Group    HUMANA MEDICARE REPLACEMENT HUMANA MEDICARE REPL [id number]     Payor Plan Address Payor Plan Phone Number Payor Plan Fax Number Effective Dates    [address] [address] [phone]  1/1/2015 - None Entered    [facility] [address]       Subscriber Name Subscriber Birth Date Member ID       MARYJO WYNN 1945 [id number]                 Emergency Contacts      (Rel.) Home Phone Work Phone Mobile Phone    ANAIS WYNN (Spouse) [phone] -- --    Aydee De Luna (Daughter) -- -- [phone]              "

## 2019-06-13 NOTE — PLAN OF CARE
Problem: Patient Care Overview  Goal: Plan of Care Review  Outcome: Ongoing (interventions implemented as appropriate)   06/13/19 0835   Coping/Psychosocial   Plan of Care Reviewed With patient   Plan of Care Review   Progress no change   OTHER   Outcome Summary [facility], Bipap worn this shift while sleeping, pt c/o pain at surgical site, controlled with prn pain meds, pt able to ambualte to C with assit x2, dressing at surgical site c/d/i, IVF continued, will continue to monitor.     Goal: Individualization and Mutuality  Outcome: Ongoing (interventions implemented as appropriate)    Goal: Discharge Needs Assessment  Outcome: Ongoing (interventions implemented as appropriate)    Goal: Interprofessional Rounds/Family Conf  Outcome: Ongoing (interventions implemented as appropriate)      Problem: Skin Injury Risk (Adult)  Goal: Identify Related Risk Factors and Signs and Symptoms  Outcome: Outcome(s) achieved Date Met: 06/13/19    Goal: Skin Health and Integrity  Outcome: Ongoing (interventions implemented as appropriate)      Problem: Fall Risk (Adult)  Goal: Identify Related Risk Factors and Signs and Symptoms  Outcome: Outcome(s) achieved Date Met: 06/13/19    Goal: Absence of Fall  Outcome: Ongoing (interventions implemented as appropriate)

## 2019-06-13 NOTE — THERAPY EVALUATION
Acute Care - Physical Therapy Initial Evaluation  [facility]     Patient Name: Maryjo Wynn  : 1945  MRN: [id number]  Today's Date: 2019   Onset of Illness/Injury or Date of Surgery: 19     Referring Physician: Desiree      Admit Date: 2019    Visit Dx:     ICD-10-CM ICD-9-CM   1. Dislocation of left hip, initial encounter (CMS/[facility]) S73.005A 835.00   2. Dislocation of internal left hip prosthesis (CMS/[facility]) T84.021A 996.42     V43.64   3. Difficulty walking R26.2 719.7     Patient Active Problem List   Diagnosis   • Degenerative joint disease (DJD) of hip   • PNA (pneumonia)   • COPD (chronic obstructive pulmonary disease) (CMS/[facility])   • Acute on chronic respiratory failure with hypoxia and hypercapnia (CMS/[facility])   • Chronic diastolic CHF (congestive heart failure) (CMS/[facility])   • CAD (coronary artery disease)   • Asymptomatic carotid artery stenosis   • Atherosclerosis of native artery of both lower extremities with intermittent claudication (CMS/[facility])   • CAD (coronary artery disease), autologous vein bypass graft   • Chronic hypoxemic respiratory failure (CMS/[facility])   • Echocardiogram abnormal   • Fusion of spine of lumbar region   • GI bleed   • History of fusion of lumbar spine   • Hx of CABG   • Postoperative atrial fibrillation (CMS/[facility])   • Paroxysmal atrial fibrillation (CMS/[facility])   • PUD (peptic ulcer disease)   • PVD (peripheral vascular disease) (CMS/[facility])   • S/P femoral-femoral bypass surgery   • Statin intolerance   • TR (tricuspid regurgitation)   • Superior mesenteric artery stenosis (CMS/[facility])   • Closed dislocation of left hip (CMS/[facility])   • Hip dislocation, left (CMS/[facility])     Past Medical History:   Diagnosis Date   • Anxiety and depression    • Arthritis    • Asthma    • COPD (chronic obstructive pulmonary disease) (CMS/[facility])    • Coronary artery disease    • History of anemia    • History of GI bleed    • History of MI (myocardial infarction)    • History of transfusion    •  Hyperlipidemia    • Hypertension    • Left hip pain    • On home oxygen therapy    • PVD (peripheral vascular disease) (CMS/HCC)      Past Surgical History:   Procedure Laterality Date   • APPENDECTOMY     • CORONARY ARTERY BYPASS GRAFT  02/13/2017    4 VESSEL. ETOCH   • ELBOW OPEN REDUCTION INTERNAL FIXATION Left     WITH HARDWARE   • ENDOSCOPY      WITH CAUTERY OF BLEEDER   • FEMORAL ARTERY STENT      HISTORY OF GARY LEGS   • FEMORAL FEMORAL BYPASS     • HIP CLOSED REDUCTION Left 5/30/2019    Procedure: HIP CLOSED REDUCTION;  Surgeon: Josh Ramírez II, MD;  Location: [facility];  Service: Orthopedics   • HIP SURGERY Right     SPLIT FEMUR SURGERY   • LUMBAR FUSION     • ORIF WRIST FRACTURE Left     WITH HARDWARE   • ORIF WRIST FRACTURE Right    • TOTAL HIP ARTHROPLASTY Right    • TOTAL HIP ARTHROPLASTY Left 4/15/2019    Procedure: LEFT TOTAL HIP ARTHROPLASTY;  Surgeon: Josh Ramírez MD;  Location: [facility];  Service: Orthopedics   • VASCULAR SURGERY      MULTIPLE STENTS PLAACED        PT ASSESSMENT (last 12 hours)      Physical Therapy Evaluation     Row Name 06/13/19 1346          PT Evaluation Time/Intention    Subjective Information  complains of;weakness;fatigue;pain  -CS     Document Type  evaluation  -CS     Mode of Treatment  physical therapy  -CS     Patient Effort  good  -CS     Row Name 06/13/19 9143          General Information    Patient Profile Reviewed?  yes  -CS     Onset of Illness/Injury or Date of Surgery  06/12/19  -CS     Referring Physician  Desiree  -HEYDI     Patient Observations  alert;cooperative;agree to therapy  -CS     Patient/Family Observations  Pt supine in bed, no acute distress. Daughter present  -CS     Equipment Currently Used at Home  walker, rolling  -CS     Existing Precautions/Restrictions  fall;hip  -CS     Equipment Issued to Patient  walker, front wheeled;gait belt  -CS     Row Name 06/13/19 2286          Relationship/Environment    Primary Source of  Support/Comfort  spouse  -CS     Lives With  spouse  -CS     Family Caregiver if Needed  child(abigail), adult  -CS     Row Name 06/13/19 1340          Resource/Environmental Concerns    Current Living Arrangements  home/apartment/condo  -CS     Resource/Environmental Concerns  none  -CS     Transportation Concerns  car, none  -CS     Row Name 06/13/19 1345          Home Main Entrance    Number of Stairs, Main Entrance  two  -CS     Row Name 06/13/19 1341          Cognitive Assessment/Intervention- PT/OT    Orientation Status (Cognition)  oriented x 3  -CS     Follows Commands (Cognition)  WNL  -CS     Safety Deficit (Cognitive)  mild deficit;insight into deficits/self awareness  -CS     Row Name 06/13/19 1340          Safety Issues, Functional Mobility    Safety Issues Affecting Function (Mobility)  insight into deficits/self awareness  -CS     Row Name 06/13/19 1344          Mobility Assessment/Treatment    Extremity Weight-bearing Status  left lower extremity  -CS     Left Lower Extremity (Weight-bearing Status)  weight-bearing as tolerated (WBAT)  -CS     Row Name 06/13/19 1347          Bed Mobility Assessment/Treatment    Bed Mobility Assessment/Treatment  supine-sit;sit-supine  -CS     Supine-Sit Gamaliel (Bed Mobility)  minimum assist (75% patient effort);verbal cues;nonverbal cues (demo/gesture)  -CS     Sit-Supine Gam[place]iel (Bed Mobility)  moderate assist (50% patient effort);verbal cues;nonverbal cues (demo/gesture)  -CS     Assistive Device (Bed Mobility)  bed rails;head of bed elevated  -CS     Row Name 06/13/19 1344          Transfer Assessment/Treatment    Transfer Assessment/Treatment  sit-stand transfer;stand-sit transfer  -CS     Sit-Stand Gamaliel (Transfers)  minimum assist (75% patient effort);2 person assist  -CS     Stand-Sit Gam[place]iel (Transfers)  minimum assist (75% patient effort);2 person assist  -CS     Row Name 06/13/19 1341          Sit-Stand Transfer    Assistive Device  (Sit-Stand Transfers)  walker, front-wheeled  -CS     Row Name 06/13/19 1345          Stand-Sit Transfer    Assistive Device (Stand-Sit Transfers)  walker, front-wheeled  -CS     Row Name 06/13/19 1345          Gait/Stairs Assessment/Training    Gait/Stairs Assessment/Training  gait/ambulation assistive device  -CS     [place] Level (Gait)  minimum assist (75% patient effort);2 person assist  -CS     Assistive Device (Gait)  walker, front-wheeled  -CS     Distance in Feet (Gait)  10  -CS     Deviations/Abnormal Patterns (Gait)  antalgic;leonor decreased;gait speed decreased;stride length decreased  -CS     Bilateral Gait Deviations  forward flexed posture  -CS     Row Name 06/13/19 1345          General ROM    GENERAL ROM COMMENTS  WNL  -CS     Row Name 06/13/19 1345          MMT (Manual Muscle Testing)    General MMT Comments  >2+/5  -CS     Row Name 06/13/19 1345          Therapeutic Exercise    Comment (Therapeutic Exercise)  L RIVKA protocol x10 (except hip abduction- too painful)  -     Row Name 06/13/19 1345          Pain Assessment    Additional Documentation  Pain Scale: FACES Pre/Post-Treatment (Group)  -CS     Row Name 06/13/19 1345          Pain Scale: Numbers Pre/Post-Treatment    Pain Location - Side  Left  -CS     Pain Location - Orientation  incisional  -CS     Pain Location  hip  -CS     Pain Intervention(s)  Repositioned;Ambulation/increased activity  -     Row Name 06/13/19 1345          Pain Scale: FACES Pre/Post-Treatment    Pain: FACES Scale, Pretreatment  4-->hurts little more  -CS     Pain: FACES Scale, Post-Treatment  6-->hurts even more  -CS     Row Name             Wound 06/12/19 1754 Left hip incision    Wound - Properties Group Date first assessed: 06/12/19  -HH Time first assessed: 1754  -HH Side: Left  - Location: hip  -HH Type: incision  -HH    Row Name             Wound 06/12/19 1924 Right hand skin tear    Wound - Properties Group Date first assessed: 06/12/19  -KD Time  first assessed: 1924  -KD Present On Admission : yes  -KD Side: Right  -KD Location: hand  -KD Type: skin tear  -KD    Row Name             Wound 06/12/19 1926 Left lateral arm skin tear    Wound - Properties Group Date first assessed: 06/12/19  -KD Time first assessed: 1926  -KD Present On Admission : yes  -KD Side: Left  -KD Orientation: lateral  -KD Location: arm  -KD Type: skin tear  -KD    Row Name 06/13/19 1345          Coping    Observed Emotional State  accepting;calm;cooperative  -CS     Verbalized Emotional State  acceptance  -CS     Row Name 06/13/19 1345          Plan of Care Review    Plan of Care Reviewed With  patient  -CS     Row Name 06/13/19 1345          Physical Therapy Clinical Impression    Patient/Family Goals Statement (PT Clinical Impression)  Wants to go home  -CS     Criteria for Skilled Interventions Met (PT Clinical Impression)  yes  -CS     Rehab Potential (PT Clinical Summary)  good, to achieve stated therapy goals  -CS     Care Plan Review (PT)  evaluation/treatment results reviewed  -CS     Row Name 06/13/19 1345          Vital Signs    O2 Delivery Pre Treatment  supplemental O2  -CS     O2 Delivery Intra Treatment  supplemental O2  -CS     O2 Delivery Post Treatment  supplemental O2  -CS     Row Name 06/13/19 1347          Physical Therapy Goals    Bed Mobility Goal Selection (PT)  bed mobility, PT goal 1  -CS     Transfer Goal Selection (PT)  transfer, PT goal 1  -CS     Gait Training Goal Selection (PT)  gait training, PT goal 1  -CS     Row Name 06/13/19 3809          Bed Mobility Goal 1 (PT)    Activity/Assistive Device (Bed Mobility Goal 1, PT)  sit to supine;supine to sit  -CS     [place] Level/Cues Needed (Bed Mobility Goal 1, PT)  conditional independence  -CS     Time Frame (Bed Mobility Goal 1, PT)  1 week  -CS     Row Name 06/13/19 2464          Transfer Goal 1 (PT)    Activity/Assistive Device (Transfer Goal 1, PT)   sit-to-stand/stand-to-sit;bed-to-chair/chair-to-bed  -CS     [place] Level/Cues Needed (Transfer Goal 1, PT)  conditional independence  -CS     Time Frame (Transfer Goal 1, PT)  1 week  -CS     Row Name 06/13/19 1345          Gait Training Goal 1 (PT)    [place] Level (Gait Training Goal 1, PT)  conditional independence  -CS     Distance (Gait Goal 1, PT)  150  -CS     Time Frame (Gait Training Goal 1, PT)  1 week  -CS     Row Name 06/13/19 1348          Positioning and Restraints    Pre-Treatment Position  in bed  -CS     Post Treatment Position  bed  -CS     In Bed  supine;call light within reach;encouraged to call for assist;with family/caregiver  -CS     Row Name 06/13/19 1343          Living Environment    Home Accessibility  stairs to enter home  -CS       User Key  (r) = Recorded By, (t) = Taken By, (c) = Cosigned By    Initials Name Provider Type    Jackie Tong, RN Registered Nurse     Sofiya Vega RN Registered Nurse     Doroteo Gomes, PT Physical Therapist        Physical Therapy Education     Title: PT OT SLP Therapies (Done)     Topic: Physical Therapy (Done)     Point: Mobility training (Done)     Learning Progress Summary           Patient Acceptance, E,TB, VU by  at 6/13/2019  1:52 PM                   Point: Home exercise program (Done)     Learning Progress Summary           Patient Acceptance, E,TB, VU by  at 6/13/2019  1:52 PM                   Point: Body mechanics (Done)     Learning Progress Summary           Patient Acceptance, E,TB, VU by  at 6/13/2019  1:52 PM                   Point: Precautions (Done)     Learning Progress Summary           Patient Acceptance, E,TB, VU by  at 6/13/2019  1:52 PM                               User Key     Initials Effective Dates Name Provider Type Discipline     05/14/18 -  Doroteo Gomes, PT Physical Therapist PT              PT Recommendation and Plan  Anticipated Discharge Disposition (PT): home with assist,  home with home health  Planned Therapy Interventions (PT Eval): balance training, bed mobility training, gait training, home exercise program, transfer training, stair training  Therapy Frequency (PT Clinical Impression): daily  Outcome Summary/Treatment Plan (PT)  Anticipated Discharge Disposition (PT): home with assist, home with home health  Plan of Care Reviewed With: patient  Outcome Summary: Pt presents s/p L RIVKA revision and open reduction in addition to repair of abductor mechanism recurrent hip dislocation. This visit she walked almost to the door and did RIVKA protocol exercises and tolerated with mild-mod complaints of pain. PLOF is independent, lives with spouse, has equipment from previous hip surgical procedures and has 2 steps to enter. Plan is to d/c home with assist/HH.   Outcome Measures     Row Name 06/13/19 1300             How much help from another person do you currently need...    Turning from your back to your side while in flat bed without using bedrails?  3  -CS      Moving from lying on back to sitting on the side of a flat bed without bedrails?  3  -CS      Moving to and from a bed to a chair (including a wheelchair)?  3  -CS      Standing up from a chair using your arms (e.g., wheelchair, bedside chair)?  3  -CS      Climbing 3-5 steps with a railing?  3  -CS      To walk in hospital room?  3  -CS      AM-PAC 6 Clicks Score  18  -CS         Functional Assessment    Outcome Measure Options  AM-PAC 6 Clicks Basic Mobility (PT)  -CS        User Key  (r) = Recorded By, (t) = Taken By, (c) = Cosigned By    Initials Name Provider Type    Doroteo Gu, PT Physical Therapist         Time Calculation:   PT Charges     Row Name 06/13/19 1356             Time Calculation    Start Time  1323  -CS      Stop Time  1342  -CS      Time Calculation (min)  19 min  -CS      PT Received On  06/13/19  -CS      PT - Next Appointment  06/14/19  -HEYDI      PT Goal Re-Cert Due Date  06/20/19  -CS        User  Key  (r) = Recorded By, (t) = Taken By, (c) = Cosigned By    Initials Name Provider Type    CS Doroteo Gomes, PT Physical Therapist        Therapy Charges for Today     Code Description Service Date Service Provider Modifiers Qty    95217130793 HC PT EVAL MOD COMPLEXITY 2 6/13/2019 Doroteo Gomes, PT GP 1    16708093269 HC PT THER PROC EA 15 MIN 6/13/2019 Doroteo Gomes, PT GP 1    [id number]  PT THER SUPP EA 15 MIN 6/13/2019 Doroteo Gomes, PT GP 1          PT G-Codes  Outcome Measure Options: AM-PAC 6 Clicks Basic Mobility (PT)  AM-PAC 6 Clicks Score: 18      Doroteo Gomes PT  6/13/2019

## 2019-06-13 NOTE — OUTREACH NOTE
General Surgery Week 2 Survey      Responses   Facility patient discharged from?  [facility]   Does the patient have one of the following disease processes/diagnoses(primary or secondary)?  General Surgery   Week 2 attempt successful?  No   Revoke  Readmitted          Cristina Arellano RN

## 2019-06-13 NOTE — PROGRESS NOTES
Discharge Planning Assessment  [facility]     Patient Name: Maryjo Wynn  MRN: [id number]  Today's Date: 6/13/2019    Admit Date: 6/12/2019    Discharge Needs Assessment     Row Name 06/13/19 1142       Living Environment    Lives With  spouse    Current Living Arrangements  home/apartment/condo    Potentially Unsafe Housing Conditions  other (see comments) no concerns     Primary Care Provided by  self    Provides Primary Care For  no one    Family Caregiver if Needed  child(abigail), adult    Quality of Family Relationships  helpful;involved;supportive    Able to Return to Prior Arrangements  yes       Resource/Environmental Concerns    Resource/Environmental Concerns  none    Transportation Concerns  car, none       Transition Planning    Patient/Family Anticipates Transition to  home with help/services    Patient/Family Anticipated Services at Transition  none    Transportation Anticipated  family or friend will provide       Discharge Needs Assessment    Readmission Within the Last 30 Days  no previous admission in last 30 days    Concerns to be Addressed  no discharge needs identified;denies needs/concerns at this time    Equipment Currently Used at Home  walker, rolling;oxygen;bipap/cpap;grab bar;shower chair    Anticipated Changes Related to Illness  none    Equipment Needed After Discharge  none    Outpatient/Agency/Support Group Needs  homecare agency        Discharge Plan     Row Name 06/13/19 1143       Plan    Plan  Home with [facility]; follow for respiratory needs     Patient/Family in Agreement with Plan  yes    Plan Comments  CCP met with patient and patient's daughter, Aydee at bedside. CCP role explained and discharge planning discussed. Face sheet verified and IMM noted. Patient's PCP is Dr. Ogden. Patient lives with her spouse, with two steps to the entrance. Patient has grab bars and shower chair present in her bathroom. Patient uses a walker for mobility. Patient has home 02 through [employer] (all the  time) and CPAP machine. Patient has used [facility] in the past and has been to [place]. Patient agreeable to using [facility]. CCP gave referral to [facility]. Patient's preferred pharmacy is [facility] in [place]; patient denies having trouble affording her medications. CCP will follow for any respiratory needs and assist with discharge home with [facility]. Daughter to transport. Sheryl Hebert CSW          Destination      No service coordination in this encounter.      Durable Medical Equipment      No service coordination in this encounter.      Dialysis/Infusion      No service coordination in this encounter.      Home Medical Care      No service coordination in this encounter.      Therapy      No service coordination in this encounter.      Community Resources      No service coordination in this encounter.          Demographic Summary     Row Name 06/13/19 1142       General Information    Admission Type  inpatient    Arrived From  emergency department    Required Notices Provided  Important Message from Medicare    Referral Source  admission list    Reason for Consult  discharge planning    Preferred Language  English     Used During This Interaction  no        Functional Status     Row Name 06/13/19 1142       Functional Status    Usual Activity Tolerance  good    Current Activity Tolerance  good       Functional Status, IADL    Medications  assistive equipment    Meal Preparation  assistive equipment    Housekeeping  assistive equipment    Laundry  assistive equipment    Shopping  assistive equipment       Mental Status    General Appearance WDL  WDL       Mental Status Summary    Recent Changes in Mental Status/Cognitive Functioning  no changes        Psychosocial    No documentation.       Abuse/Neglect    No documentation.       Legal    No documentation.       Substance Abuse    No documentation.       Patient Forms    No documentation.           ABDELRAHMAN Gusman

## 2019-06-13 NOTE — PROGRESS NOTES
"  Orthopaedic Surgery   Daily Progress Note  Dr. Josh Ramírez Sr  [phone]  DEMOGRAPHICS:   · Maryjo Wynn   · Age:74 y.o.   · MRN:[id number]  · Admitted: 6/12/2019    PROCEDURE: 1 Day Post-Op s/p Procedure(s):  LT. HIP OPEN REDUCTION REVISION     /64 (BP Location: Right arm, Patient Position: Lying)   Pulse 78   Temp 98.1 °F (36.7 °C) (Oral)   Resp 20   Ht 152.4 cm (60\")   Wt 50.8 kg (112 lb)   SpO2 (!) 83%   BMI 21.87 kg/m²     Lab Results (last 24 hours)     Procedure Component Value Units Date/Time    Basic Metabolic Panel [[id number]]  (Abnormal) Collected:  06/13/19 0423    Specimen:  Blood Updated:  06/13/19 0534     Glucose 91 mg/dL      BUN 8 mg/dL      Creatinine 0.38 mg/dL      Sodium 130 mmol/L      Potassium 4.1 mmol/L      Chloride 87 mmol/L      CO2 33.9 mmol/L      Calcium 8.4 mg/dL      eGFR Non African Amer >150 mL/min/1.73      BUN/Creatinine Ratio 21.1     Anion Gap 9.1 mmol/L     Narrative:       GFR Normal >60  Chronic Kidney Disease <60  Kidney Failure <15    Hemoglobin & Hematocrit, Blood [[id number]]  (Abnormal) Collected:  06/13/19 0423    Specimen:  Blood Updated:  06/13/19 0511     Hemoglobin 8.1 g/dL      Hematocrit 29.2 %     Wound Culture - Wound, Hip, Left [[id number]] Collected:  06/12/19 1637    Specimen:  Wound from Hip, Left Updated:  06/13/19 0413     Gram Stain No organisms seen      Rare (1+) WBCs per low power field    Blood Gas, Arterial [[id number]]  (Abnormal) Collected:  06/12/19 2251    Specimen:  Arterial Blood Updated:  06/12/19 2319     Site Arterial: right radial     Camilo's Test Positive     pH, Arterial 7.324 pH units      pCO2, Arterial 87.0 mm Hg      Comment: Critical:Notify Dr LAURA JONES MD (12-Jun-19 23:03:11)Read back ok        pO2, Arterial 115.5 mm Hg      HCO3, Arterial 45.2 mmol/L      Base Excess, Arterial 16.0 mmol/L      O2 Saturation Calculated 97.7 %      Barometric Pressure for Blood Gas 745.8 mmHg      Modality Cannula     Flow " Rate 2 lpm      Rate 20 Breaths/minute     Anaerobic Culture - Wound, Hip, Left [[id number]] Collected:  06/12/19 1637    Specimen:  Wound from Hip, Left Updated:  06/12/19 1650    CBC & Differential [[id number]] Collected:  06/12/19 1426    Specimen:  Blood Updated:  06/12/19 1508    Narrative:       The following orders were created for panel order CBC & Differential.  Procedure                               Abnormality         Status                     ---------                               -----------         ------                     Scan Slide[[id number]]                                       Final result               CBC Auto Differential[[id number]]        Abnormal            Final result                 Please view results for these tests on the individual orders.    CBC Auto Differential [[id number]]  (Abnormal) Collected:  06/12/19 1426    Specimen:  Blood from Arm, Right Updated:  06/12/19 1508     WBC 14.66 10*3/mm3      RBC 3.27 10*6/mm3      Hemoglobin 10.0 g/dL      Hematocrit 35.7 %      .2 fL      MCH 30.6 pg      MCHC 28.0 g/dL      RDW 14.1 %      RDW-SD 57.6 fl      MPV 11.8 fL      Platelets 299 10*3/mm3      Neutrophil % 90.1 %      Lymphocyte % 6.0 %      Monocyte % 2.9 %      Eosinophil % 0.1 %      Basophil % 0.1 %      Immature Grans % 0.8 %      Neutrophils, Absolute 13.22 10*3/mm3      Lymphocytes, Absolute 0.88 10*3/mm3      Monocytes, Absolute 0.42 10*3/mm3      Eosinophils, Absolute 0.01 10*3/mm3      Basophils, Absolute 0.02 10*3/mm3      Immature Grans, Absolute 0.11 10*3/mm3      nRBC 0.0 /100 WBC     Scan Slide [[id number]] Collected:  06/12/19 1426    Specimen:  Blood from Arm, Right Updated:  06/12/19 1508     Macrocytes Slight/1+     WBC Morphology Normal     Platelet Morphology Normal    Basic Metabolic Panel [[id number]]  (Abnormal) Collected:  06/12/19 1426    Specimen:  Blood from Arm, Right Updated:  06/12/19 1507     Glucose 99 mg/dL      BUN 8 mg/dL      Creatinine  0.38 mg/dL      Sodium 144 mmol/L      Potassium 3.8 mmol/L      Chloride 90 mmol/L      CO2 40.9 mmol/L      Calcium 9.6 mg/dL      eGFR Non African Amer >150 mL/min/1.73      BUN/Creatinine Ratio 21.1     Anion Gap 13.1 mmol/L     Narrative:       GFR Normal >60  Chronic Kidney Disease <60  Kidney Failure <15          Imaging Results (last 24 hours)     Procedure Component Value Units Date/Time    XR Hip 1 View Without Pelvis Left (Surgery Only) [[id number]] Collected:  06/12/19 1936     Updated:  06/12/19 2019    Narrative:       EXAMINATION: LEFT HIP AND PELVIS RADIOGRAPHS     HISTORY: 74-year-old female post hip arthroplasty.     FINDINGS: A portable AP radiograph of the left hip was obtained and  demonstrates evidence of prior total left hip arthroplasty. Alignment is  appropriate. Overlying skin staples are noted. A small amount of gas is  seen within the adjacent soft tissues. There is no evidence for a  fracture.       Impression:       Status post reduction of a previously dislocated  arthroplasty of the left hip without evidence for an acute abnormality.     This report was finalized on 6/12/2019 8:15 PM by Dr. Sukhjinder Hollins M.D.       XR Hip With or Without Pelvis 1 View Left [[id number]] Collected:  06/12/19 1248     Updated:  06/12/19 1252    Narrative:       XR HIP W OR WO PELVIS 1 VIEW LEFT-  06/12/2019     HISTORY: Pain. Dislocation.     There is dislocation of the left hip prosthesis with superior lateral  displacement of the proximal femoral component with respect to the  acetabular component.. No fractures are seen. Lower lumbar fusion  hardware is seen.       Impression:       1. Dislocated left hip prosthesis.     This report was finalized on 6/12/2019 12:49 PM by Dr. Frank Kolb M.D.             Patient Care Team:  Rashi Ogden MD as PCP - General (Family Medicine)  Linda Conde MD as Consulting Physician (Obstetrics and Gynecology)  Frank Staton MD as  Consulting Physician (Pulmonary Disease)  Ryan Diaz MD as Consulting Physician (Interventional Cardiology)    SUBJECTIVE  Relatively comfortable    PHYSICAL EXAM  Neurovascular exam intact  Dressing intact     ASSESSMENT: Patient is a 74 y.o. female who is 1 Day Post-Op s/p Procedure(s):  1.  LT. HIP OPEN REDUCTION DISLOCATED TOTAL HIP WITH REVISION of the femoral implant and repair of the abductor mechanism  2.  Severe COPD oxygen and steroid-dependent  3.  Anemia  4.  History of GI bleed  5.  Coronary artery disease  6.  History of peripheral vascular disease    PLAN / DISPOSITION:  Begin to mobilize.  Strict hip precautions.  She may weight-bear as tolerated.  81 mg aspirin daily for DVT prophylaxis  Pulmonary management as per the pulmonary team  Carefully follow and monitor her hemoglobin.  Her hemoglobin is 8.1 today.  She does not need transfusing today but may well need as she equilibrates later on.    Josh Ramírez Sr, MD  Orthopaedic Surgery  [facility]  [phone]

## 2019-06-13 NOTE — PLAN OF CARE
Problem: Patient Care Overview  Goal: Plan of Care Review  Outcome: Ongoing (interventions implemented as appropriate)   06/13/19 1613   Coping/Psychosocial   Plan of Care Reviewed With patient;daughter   Plan of Care Review   Progress improving   OTHER   Outcome Summary VSS. O2 drops with any activity but comes up when back to bed. IVF d/c'd. Pain meds lasting 3 hours.  Continue to monitor.      Goal: Individualization and Mutuality  Outcome: Ongoing (interventions implemented as appropriate)    Goal: Discharge Needs Assessment  Outcome: Ongoing (interventions implemented as appropriate)    Goal: Interprofessional Rounds/Family Conf  Outcome: Ongoing (interventions implemented as appropriate)      Problem: Skin Injury Risk (Adult)  Goal: Skin Health and Integrity  Outcome: Ongoing (interventions implemented as appropriate)      Problem: Fall Risk (Adult)  Goal: Absence of Fall  Outcome: Ongoing (interventions implemented as appropriate)      Problem: Pain, Chronic (Adult)  Goal: Identify Related Risk Factors and Signs and Symptoms  Outcome: Outcome(s) achieved Date Met: 06/13/19    Goal: Acceptable Pain/Comfort Level and Functional Ability  Outcome: Ongoing (interventions implemented as appropriate)      Problem: Chronic Obstructive Pulmonary Disease (Adult)  Goal: Signs and Symptoms of Listed Potential Problems Will be Absent, Minimized or Managed (Chronic Obstructive Pulmonary Disease)  Outcome: Ongoing (interventions implemented as appropriate)

## 2019-06-14 NOTE — PLAN OF CARE
Problem: Patient Care Overview  Goal: Plan of Care Review  Outcome: Ongoing (interventions implemented as appropriate)    Goal: Individualization and Mutuality  Outcome: Ongoing (interventions implemented as appropriate)    Goal: Discharge Needs Assessment  Outcome: Ongoing (interventions implemented as appropriate)    Goal: Interprofessional Rounds/Family Conf  Outcome: Ongoing (interventions implemented as appropriate)      Problem: Skin Injury Risk (Adult)  Goal: Skin Health and Integrity  Outcome: Ongoing (interventions implemented as appropriate)      Problem: Fall Risk (Adult)  Goal: Absence of Fall  Outcome: Ongoing (interventions implemented as appropriate)      Problem: Pain, Chronic (Adult)  Goal: Acceptable Pain/Comfort Level and Functional Ability  Outcome: Ongoing (interventions implemented as appropriate)      Problem: Chronic Obstructive Pulmonary Disease (Adult)  Goal: Signs and Symptoms of Listed Potential Problems Will be Absent, Minimized or Managed (Chronic Obstructive Pulmonary Disease)  Outcome: Ongoing (interventions implemented as appropriate)

## 2019-06-14 NOTE — PLAN OF CARE
Problem: Patient Care Overview  Goal: Plan of Care Review  Outcome: Ongoing (interventions implemented as appropriate)   06/14/19 0646   Coping/Psychosocial   Plan of Care Reviewed With patient   Plan of Care Review   Progress improving   OTHER   Outcome Summary VSS; L hip pain managed w/ PO PRN pain med; bipap overnight; critical hgb this am, 1 unit RBC given; continue to monitor.     Goal: Individualization and Mutuality  Outcome: Ongoing (interventions implemented as appropriate)    Goal: Discharge Needs Assessment  Outcome: Ongoing (interventions implemented as appropriate)    Goal: Interprofessional Rounds/Family Conf  Outcome: Ongoing (interventions implemented as appropriate)      Problem: Skin Injury Risk (Adult)  Goal: Skin Health and Integrity  Outcome: Ongoing (interventions implemented as appropriate)      Problem: Fall Risk (Adult)  Goal: Absence of Fall  Outcome: Ongoing (interventions implemented as appropriate)      Problem: Pain, Chronic (Adult)  Goal: Acceptable Pain/Comfort Level and Functional Ability  Outcome: Ongoing (interventions implemented as appropriate)      Problem: Chronic Obstructive Pulmonary Disease (Adult)  Goal: Signs and Symptoms of Listed Potential Problems Will be Absent, Minimized or Managed (Chronic Obstructive Pulmonary Disease)  Outcome: Ongoing (interventions implemented as appropriate)

## 2019-06-14 NOTE — PROGRESS NOTES
Jose Tomlinson MD                          [phone]      Patient ID:    Name:  Maryjo Wynn    MRN:  [id number]    1945   74 y.o.  female            Patient Care Team:  Rashi Ogden MD as PCP - General (Family Medicine)  Linda Conde MD as Consulting Physician (Obstetrics and Gynecology)  Frank Staton MD as Consulting Physician (Pulmonary Disease)  Ryan Diaz MD as Consulting Physician (Interventional Cardiology)    CC/ Reason for visit: Per Assessment mentioned below    Subjective: Pt seen and examined this AM. No acute overnight events noted. Doing better.  Stable oxygen requirements.  States that the new pressure settings are something that she likes and close to her home machine.  She states that she cannot get her home machine as no one can pick it up    ROS: Denies any subjective fevers, chest pain, nausea/ vomiting.  No new worsening cough or shortness of breath    Objective     Vital Signs past 24hrs    BP range: BP: (124-148)/(61-92) 140/85  Pulse range: Heart Rate:  [] 96  Resp rate range: Resp:  [16-20] 18  Temp range: Temp (24hrs), Av.2 °F (36.8 °C), Min:97.7 °F (36.5 °C), Max:98.9 °F (37.2 °C)      Ventilator/Non-Invasive Ventilation Settings (From admission, onward)    Start     Ordered    19  NIPPV (CPAP or BIPAP)  At Bedtime & As Needed-RT     Question Answer Comment   Indication: Acute Respiratory Failure    Type: AVAPS/PC/PS    NIPPV Mask Interface: Full Face Mask    Backup Rate 16    Target VT (mL) 500    EPAP/PEEP (cm H2O) 5    Min Pressure (cm H2O) 12    Max Pressure (cm H2O) 28    Titrate for SPO2 90%        19          Device (Oxygen Therapy): nasal cannula FiO2 (%): 28 %     50.8 kg (112 lb); Body mass index is 21.87 kg/m².      Intake/Output Summary (Last 24 hours) at 2019 1653  Last data filed at 2019 1256  Gross per 24 hour   Intake 1250.87 ml   Output --   Net  1250.87 ml       PHYSICAL EXAM   Constitutional: Middle aged pt in chair, No acute respiratory distress, + accessory muscle use  Head: - NCAT  Eyes: No pallor, Anicteric conjunctiva, EOMI.  ENMT:  Mallampati 4, no oral thrush. No palpable cervical lymphadenopathy, Dry MM.   Heart: RRR, no murmur. No pedal edema   Lungs: ASHLEY +, Distant BS, No wheezes/ crackles heard    Abdomen: Soft. No tenderness, guarding or rigidity. No palpable masses  Extremities: Extremities warm and well perfused. Surgical changes @ hip.  Neuro: Conscious, answers appropriately, no gross focal neuro deficits  Psych: Mood and affect appropriate    Scheduled meds:      arformoterol 15 mcg Nebulization BID - RT   aspirin 81 mg Oral Daily   budesonide 0.5 mg Nebulization BID - RT   busPIRone 10 mg Oral BID   carvedilol 6.25 mg Oral BID   dicyclomine 10 mg Oral TID   ferrous sulfate 325 mg Oral Daily With Breakfast   furosemide 40 mg Oral Daily   guaiFENesin 1,200 mg Oral Q12H   losartan 25 mg Oral Daily   pantoprazole 40 mg Oral Q AM   potassium chloride 20 mEq Oral BID   predniSONE 10 mg Oral Daily   roflumilast 500 mcg Oral Daily   sertraline 50 mg Oral Daily   sucralfate 500 mg Oral 4x Daily PC & at Bedtime   tiotropium bromide monohydrate 2 puff Inhalation Daily - RT       IV meds:                           Data Review:      Results from last 7 days   Lab Units 06/14/19  0325 06/13/19  0423 06/12/19  1426   SODIUM mmol/L 136 130* 144   POTASSIUM mmol/L 4.4 4.1 3.8   CHLORIDE mmol/L 92* 87* 90*   CO2 mmol/L 37.8* 33.9* 40.9*   BUN mg/dL 8 8 8   CREATININE mg/dL 0.35* 0.38* 0.38*   CALCIUM mg/dL 8.4* 8.4* 9.6   GLUCOSE mg/dL 97 91 99   WBC 10*3/mm3 11.30*  --  14.66*   HEMOGLOBIN g/dL 6.5* 8.1* 10.0*   PLATELETS 10*3/mm3 197  --  299       Lab Results   Component Value Date    CALCIUM 8.4 (L) 06/14/2019       Results from last 7 days   Lab Units 06/12/19  1637   WOUNDCX  No growth at 2 days       Results from last 7 days   Lab Units  06/12/19  2251   PH, ARTERIAL pH units 7.324*   PO2 ART mm Hg 115.5*   PCO2, ARTERIAL mm Hg 87.0*   HCO3 ART mmol/L 45.2*        Results Review:    I have reviewed the relevant laboratory results and reviewed the chest imaging from the last 3 days personally and summarized it below    Assessment    S/p Lt Revision THR 6/12   COPD not in exac   Chronic high-dose steroids  Chronic hypoxemic respiratory failure  Chronic hypercapnic respiratory failure  CAD    PLAN:  Continue with the current nebulizer regimen.  Would recommend weaning down steroids slowly as an outpatient and f/u her pulm   Patient on supplemental oxygen at baseline levels.  Doing well on lower Tv @ 400 with AVAPS. On bipap @ home and says that she uses every night. Will let her be on it for now.      I have discussed my findings and recommendations with patient, family and nursing staff.     Jose Tomlinson MD  6/14/2019

## 2019-06-14 NOTE — PLAN OF CARE
Problem: Patient Care Overview  Goal: Plan of Care Review  Outcome: Ongoing (interventions implemented as appropriate)   06/14/19 0591   Coping/Psychosocial   Plan of Care Reviewed With patient   Plan of Care Review   Progress no change   OTHER   Outcome Summary VSS. 2 units of blood given for low hgb. Sat in chair for lunch and dinner. Pain med given twice. Continue to monitor.      Goal: Individualization and Mutuality  Outcome: Ongoing (interventions implemented as appropriate)    Goal: Discharge Needs Assessment  Outcome: Ongoing (interventions implemented as appropriate)    Goal: Interprofessional Rounds/Family Conf  Outcome: Ongoing (interventions implemented as appropriate)      Problem: Skin Injury Risk (Adult)  Goal: Skin Health and Integrity  Outcome: Ongoing (interventions implemented as appropriate)      Problem: Fall Risk (Adult)  Goal: Absence of Fall  Outcome: Ongoing (interventions implemented as appropriate)      Problem: Pain, Chronic (Adult)  Goal: Acceptable Pain/Comfort Level and Functional Ability  Outcome: Ongoing (interventions implemented as appropriate)      Problem: Chronic Obstructive Pulmonary Disease (Adult)  Goal: Signs and Symptoms of Listed Potential Problems Will be Absent, Minimized or Managed (Chronic Obstructive Pulmonary Disease)  Outcome: Ongoing (interventions implemented as appropriate)

## 2019-06-14 NOTE — PROGRESS NOTES
"  Orthopaedic Surgery   Daily Progress Note  Dr. Josh Ramírez Sr  [phone]  DEMOGRAPHICS:   · Maryjo Wynn   · Age:74 y.o.   · MRN:[id number]  · Admitted: 6/12/2019    PROCEDURE: 2 Days Post-Op s/p Procedure(s):  LT. HIP OPEN REDUCTION REVISION     /61   Pulse 88   Temp 98.2 °F (36.8 °C) (Oral)   Resp 18   Ht 152.4 cm (60\")   Wt 50.8 kg (112 lb)   SpO2 100%   BMI 21.87 kg/m²     Lab Results (last 24 hours)     Procedure Component Value Units Date/Time    POC Glucose Once [[id number]]  (Normal) Collected:  06/14/19 0609    Specimen:  Blood Updated:  06/14/19 0611     Glucose 88 mg/dL     Basic Metabolic Panel [[id number]]  (Abnormal) Collected:  06/14/19 0325    Specimen:  Blood Updated:  06/14/19 0440     Glucose 97 mg/dL      BUN 8 mg/dL      Creatinine 0.35 mg/dL      Sodium 136 mmol/L      Potassium 4.4 mmol/L      Chloride 92 mmol/L      CO2 37.8 mmol/L      Calcium 8.4 mg/dL      eGFR Non African Amer >150 mL/min/1.73      BUN/Creatinine Ratio 22.9     Anion Gap 6.2 mmol/L     Narrative:       GFR Normal >60  Chronic Kidney Disease <60  Kidney Failure <15    CBC & Differential [[id number]] Collected:  06/14/19 0325    Specimen:  Blood Updated:  06/14/19 0423    Narrative:       The following orders were created for panel order CBC & Differential.  Procedure                               Abnormality         Status                     ---------                               -----------         ------                     CBC Auto Differential[[id number]]        Abnormal            Final result                 Please view results for these tests on the individual orders.    CBC Auto Differential [[id number]]  (Abnormal) Collected:  06/14/19 0325    Specimen:  Blood Updated:  06/14/19 0423     WBC 11.30 10*3/mm3      RBC 2.10 10*6/mm3      Hemoglobin 6.5 g/dL      Hematocrit 22.8 %      .6 fL      MCH 31.0 pg      MCHC 28.5 g/dL      RDW 13.9 %      RDW-SD 55.4 fl      MPV 11.5 fL      " Platelets 197 10*3/mm3      Neutrophil % 76.8 %      Lymphocyte % 14.9 %      Monocyte % 7.2 %      Eosinophil % 0.2 %      Basophil % 0.1 %      Neutrophils, Absolute 8.69 10*3/mm3      Lymphocytes, Absolute 1.68 10*3/mm3      Monocytes, Absolute 0.81 10*3/mm3      Eosinophils, Absolute 0.02 10*3/mm3      Basophils, Absolute 0.01 10*3/mm3     POC Glucose Once [[id number]]  (Abnormal) Collected:  06/13/19 2111    Specimen:  Blood Updated:  06/13/19 2112     Glucose 131 mg/dL     POC Glucose Once [[id number]]  (Normal) Collected:  06/13/19 1150    Specimen:  Blood Updated:  06/13/19 1152     Glucose 114 mg/dL           Imaging Results (last 24 hours)     ** No results found for the last 24 hours. **          Patient Care Team:  Rashi Ogden MD as PCP - General (Family Medicine)  Linda Conde MD as Consulting Physician (Obstetrics and Gynecology)  Frank Statno MD as Consulting Physician (Pulmonary Disease)  Ryan Diaz MD as Consulting Physician (Interventional Cardiology)    SUBJECTIVE  More soreness as expected with her hip block now ineffective/worn off.    PHYSICAL EXAM  Dressing is intact.     ASSESSMENT: Patient is a 74 y.o. female who is 2 Days Post-Op s/p Procedure(s):  1.  LT. total HIP OPEN REDUCTION REVISION  2.  Acute blood loss anemia  3.  Oxygen and steroid-dependent COPD    PLAN / DISPOSITION:  Transfused 2 units of packed cells today.  Monitor follow-up hemoglobin/hematocrit.  Mobilize.    Josh Ramírez Sr, MD  Orthopaedic Surgery  [facility]  [phone]

## 2019-06-14 NOTE — PROGRESS NOTES
Continued Stay Note  [facility]     Patient Name: Maryjo Wynn  MRN: [id number]  Today's Date: 6/14/2019    Admit Date: 6/12/2019    Discharge Plan     Row Name 06/14/19 1217       Plan    Plan  Home with [facility]     Patient/Family in Agreement with Plan  yes    Plan Comments  [facility] set up to follow pt at discharge for nursing and PT.  Hgb 6.5 and receiving 2 units of PRBC.  PT recommends Home with HH. Rashawn Kruse RN-BC        Discharge Codes    No documentation.             Rashawn Kruse, RN

## 2019-06-14 NOTE — CONSULTS
"Adult Nutrition  Assessment/PES    Patient Name:  Maryjo Wynn  YOB: 1945  MRN: [id number]  Admit Date:  6/12/2019    Assessment Date:  6/14/2019    Comments:  Nursing nutrition screen trigger. No significant weight loss. Patient states not eating that much but likes the food and refuses any snacks or supplements. Follow as needed.     Reason for Assessment     Row Name 06/14/19 1426          Reason for Assessment    Reason For Assessment  identified at risk by screening criteria     Diagnosis  -- s/p  total hip replacement     Identified At Risk by Screening Criteria  MST SCORE 2+         Nutrition/Diet History     Row Name 06/14/19 1427          Nutrition/Diet History    Typical Food/Fluid Intake  Says doesn't have a great appetite but likes the food. Refuses any supplements. To receive transfusion.          Anthropometrics     Row Name 06/14/19 1428          Anthropometrics    Height  152.4 cm (60\")        Admit Weight    Admit Weight  50.8 kg (111 lb 15.9 oz)        Ideal Body Weight (IBW)    Ideal Body Weight (IBW) (kg)  45.86            Labs/Tests/Procedures/Meds     Row Name 06/14/19 1428          Labs/Procedures/Meds    Lab Results Reviewed  reviewed, pertinent        Diagnostic Tests/Procedures    Diagnostic Test/Procedure Reviewed  reviewed, pertinent        Medications    Pertinent Medications Reviewed  reviewed, pertinent         Physical Findings     Row Name 06/14/19 1428          Physical Findings    Skin  -- L hip inc, skin tears           Nutrition Prescription Ordered     Row Name 06/14/19 1429          Nutrition Prescription PO    Current PO Diet  Regular     Fluid Consistency  Thin         Evaluation of Received Nutrient/Fluid Intake           PO Evaluation    Number of Meals  1  --    % PO Intake  25  --              Problem/Interventions:  Problem 1     Row Name 06/14/19 1429          Nutrition Diagnoses Problem 1    Problem 1  Nutrition Appropriate for Condition at this Time  "    Etiology (related to)  Medical Diagnosis     Ortho  THR     Signs/Symptoms (evidenced by)  PO Intake     Percent (%) intake recorded  25 %     Over number of meals  1                 Intervention Goal     Row Name 06/14/19 1429          Intervention Goal    General  Maintain nutrition;Disease management/therapy;Meet nutritional needs for age/condition     PO  Tolerate PO;Increase intake     Weight  No significant weight loss         Nutrition Intervention     Row Name 06/14/19 1430          Nutrition Intervention    RD/Tech Action  Supplement offered/refused;Care plan reviewd;Follow Tx progress;Encourage intake           Education/Evaluation     Row Name 06/14/19 1439          Monitor/Evaluation    Monitor  Per protocol           Electronically signed by:  Andreia Snyder RD, LD  06/14/19 2:30 PM

## 2019-06-15 NOTE — PLAN OF CARE
Problem: Patient Care Overview  Goal: Plan of Care Review  Outcome: Ongoing (interventions implemented as appropriate)   06/15/19 1226   Coping/Psychosocial   Plan of Care Reviewed With patient   Plan of Care Review   Progress improving   OTHER   Outcome Summary pt able to walk farther but fatigues quickly and required frequent standing rests, gait is antalgic, pt requ min assist to walk 25 ft, pt plans home with assist at d/c

## 2019-06-15 NOTE — PROGRESS NOTES
Orthopaedic Surgery   Daily Progress Note  Dr. IRMA Ramírez II  [phone]  DEMOGRAPHICS:   · Maryjo Wynn   · Age:74 y.o.   · MRN:[id number]  · Admitted: 6/12/2019    PROCEDURE: 3 Days Post-Op s/p Procedure(s):  LT. HIP OPEN REDUCTION REVISION     HOSPITAL PROGRESS  · Patient Issues: 2 units of blood given for low hemoglobin yesterday, otherwise doing okay.  Doing okay at this point from a pulmonology standpoint  · Ambulation/Activity: Unable to do physical therapy yesterday unfortunately     VITALS:  Vitals:    06/15/19 0825 06/15/19 0835 06/15/19 0942 06/15/19 1338   BP:    144/85   BP Location:    Left arm   Patient Position:    Lying   Pulse: 97 113 68 93   Resp: 16 16  16   Temp:    97.9 °F (36.6 °C)   TempSrc:    Oral   SpO2: 99%  99% 94%   Weight:       Height:           PHYSICAL EXAM:  · CONSTITUTIONAL: A&Ox3, No acute distress  · LUNGS: Equal chest rise, no shortness of air  · CARDIOVASCULAR: palpable peripheral pulses  · WOUND: Tess wound VAC is completely saturated  · EXTREMITY: Left Lower Extremity  · Pulses: brisk capillary refill intact  · Sensation: Sensation intact to light touch to the saphenous/sural/tibial/deep peroneal/superficial peroneal nerves, and grossly throughout the extremity.  · Motor: 5/5 EHL/FHL/TA/GS motor complexes    LABS:   Lab Results   Component Value Date    HGB 9.9 (L) 06/15/2019     Lab Results   Component Value Date    WBC 11.60 (H) 06/15/2019     Lab Results   Component Value Date    GLUCOSE 89 06/15/2019    CALCIUM 8.6 06/15/2019     06/15/2019    K 4.7 06/15/2019    CO2 39.6 (H) 06/15/2019    CL 92 (L) 06/15/2019    BUN 6 (L) 06/15/2019    CREATININE 0.25 (L) 06/15/2019    EGFRIFNONA >150 06/15/2019    BCR 24.0 06/15/2019    ANIONGAP 7.4 06/15/2019       ASSESSMENT: Patient is a 74 y.o. female who is 3 Days Post-Op s/p Procedure(s):  LT. HIP OPEN REDUCTION REVISION     PLAN:   · Weight Bearing: Left Lower Extremity Weight Bearing As Tolerated  · Labs:  "Above lab values review. Plan: No intervention necessary at this time.   · PT/OT: To Mobilize  · DVT PPX: ASA 81 mg  · Post-Op Xray: RIVKA implants in good position. Leg lengths acceptable.    · Hip drainage: She is draining sanguinous fluid from her hip and the carmen wound VAC is saturated.  We will do a dry dressing change today.  Drainage does need to improve before discharge home.  · Dispo: Acute, possibly home Monday pending medical clearance    R \"Solo\" Desiree KELLEY MD  Orthopaedic Surgery  [facility]  [phone]    "

## 2019-06-15 NOTE — PROGRESS NOTES
Jose Tomlinson MD                          [phone]      Patient ID:    Name:  Maryjo Wynn    MRN:  [id number]    1945   74 y.o.  female            Patient Care Team:  Rashi Ogden MD as PCP - General (Family Medicine)  Linda Conde MD as Consulting Physician (Obstetrics and Gynecology)  Frank Staton MD as Consulting Physician (Pulmonary Disease)  Ryan Diaz MD as Consulting Physician (Interventional Cardiology)    CC/ Reason for visit: Per Assessment mentioned below    Subjective: Pt seen and examined this AM. No acute overnight events noted. Doing better.  Stable oxygen requirements.  Likes the PAP device here.  She states that she cannot get her home machine as no one can pick it up. Rectocele bothering her.     ROS: Denies any subjective fevers, chest pain, nausea/ vomiting.  No new worsening cough or shortness of breath    Objective     Vital Signs past 24hrs    BP range: BP: (108-155)/(53-85) 144/85  Pulse range: Heart Rate:  [] 93  Resp rate range: Resp:  [16-20] 16  Temp range: Temp (24hrs), Av.1 °F (36.7 °C), Min:97.7 °F (36.5 °C), Max:98.6 °F (37 °C)      Ventilator/Non-Invasive Ventilation Settings (From admission, onward)    Start     Ordered    19  NIPPV (CPAP or BIPAP)  At Bedtime & As Needed-RT     Question Answer Comment   Indication: Acute Respiratory Failure    Type: AVAPS/PC/PS    NIPPV Mask Interface: Full Face Mask    Backup Rate 16    Target VT (mL) 500    EPAP/PEEP (cm H2O) 5    Min Pressure (cm H2O) 12    Max Pressure (cm H2O) 28    Titrate for SPO2 90%        19 230          Device (Oxygen Therapy): nasal cannula FiO2 (%): 28 %     50.8 kg (112 lb); Body mass index is 21.87 kg/m².      Intake/Output Summary (Last 24 hours) at 6/15/2019 1716  Last data filed at 6/15/2019 1400  Gross per 24 hour   Intake 860 ml   Output 400 ml   Net 460 ml       PHYSICAL EXAM   Constitutional:  Middle aged pt in chair, No acute respiratory distress, + accessory muscle use  Head: - NCAT  Eyes: No pallor, Anicteric conjunctiva, EOMI.  ENMT:  Mallampati 4, no oral thrush. No palpable cervical lymphadenopathy, Dry MM.   Heart: RRR, no murmur. No pedal edema   Lungs: ASHLEY +, Distant BS, No wheezes/ crackles heard    Abdomen: Soft. No tenderness, guarding or rigidity. No palpable masses  Extremities: Extremities warm and well perfused. Surgical changes @ hip.  Neuro: Conscious, answers appropriately, no gross focal neuro deficits  Psych: Mood and affect appropriate    Scheduled meds:      arformoterol 15 mcg Nebulization BID - RT   aspirin 81 mg Oral Daily   budesonide 0.5 mg Nebulization BID - RT   busPIRone 10 mg Oral BID   carvedilol 6.25 mg Oral BID   dicyclomine 10 mg Oral TID   ferrous sulfate 325 mg Oral Daily With Breakfast   furosemide 40 mg Oral Daily   guaiFENesin 1,200 mg Oral Q12H   losartan 25 mg Oral Daily   pantoprazole 40 mg Oral Q AM   potassium chloride 20 mEq Oral BID   predniSONE 10 mg Oral Daily   roflumilast 500 mcg Oral Daily   sertraline 50 mg Oral Daily   sucralfate 500 mg Oral 4x Daily PC & at Bedtime   tiotropium bromide monohydrate 2 puff Inhalation Daily - RT       IV meds:                           Data Review:      Results from last 7 days   Lab Units 06/15/19  0440 06/14/19  0325 06/13/19  0423 06/12/19  1426   SODIUM mmol/L 139 136 130* 144   POTASSIUM mmol/L 4.7 4.4 4.1 3.8   CHLORIDE mmol/L 92* 92* 87* 90*   CO2 mmol/L 39.6* 37.8* 33.9* 40.9*   BUN mg/dL 6* 8 8 8   CREATININE mg/dL 0.25* 0.35* 0.38* 0.38*   CALCIUM mg/dL 8.6 8.4* 8.4* 9.6   GLUCOSE mg/dL 89 97 91 99   WBC 10*3/mm3 11.60* 11.30*  --  14.66*   HEMOGLOBIN g/dL 9.9* 6.5* 8.1* 10.0*   PLATELETS 10*3/mm3 205 197  --  299       Lab Results   Component Value Date    CALCIUM 8.6 06/15/2019       Results from last 7 days   Lab Units 06/12/19  1637   WOUNDCX  No growth at 3 days       Results from last 7 days   Lab Units  06/12/19  2251   PH, ARTERIAL pH units 7.324*   PO2 ART mm Hg 115.5*   PCO2, ARTERIAL mm Hg 87.0*   HCO3 ART mmol/L 45.2*        Results Review:    I have reviewed the relevant laboratory results and reviewed the chest imaging from the last 3 days personally and summarized it below    Assessment    S/p Lt Revision THR 6/12   COPD not in exac   Chronic high-dose steroids  Chronic hypoxemic respiratory failure  Chronic hypercapnic respiratory failure  CAD    PLAN:  Continue with the current nebulizer regimen.  Dec pred to 10 for now and f/u her pulm   Patient on supplemental oxygen at baseline levels.  Doing well on lower Tv @ 400 with AVAPS. On bipap @ home and says that she uses every night. Will let her be on it for now.    Ok to dc from my end    I have discussed my findings and recommendations with patient, family and nursing staff.     Jose Tomlinson MD  6/15/2019

## 2019-06-15 NOTE — PLAN OF CARE
Problem: Patient Care Overview  Goal: Plan of Care Review  Outcome: Ongoing (interventions implemented as appropriate)   06/15/19 8689   Coping/Psychosocial   Plan of Care Reviewed With patient   Plan of Care Review   Progress no change   OTHER   Outcome Summary VSS; bipap overnight; PRN PO pain med given for L hip pain; q 2 turn encouraged; continue to monitor.     Goal: Individualization and Mutuality  Outcome: Ongoing (interventions implemented as appropriate)    Goal: Discharge Needs Assessment  Outcome: Ongoing (interventions implemented as appropriate)    Goal: Interprofessional Rounds/Family Conf  Outcome: Ongoing (interventions implemented as appropriate)      Problem: Skin Injury Risk (Adult)  Goal: Skin Health and Integrity  Outcome: Ongoing (interventions implemented as appropriate)      Problem: Fall Risk (Adult)  Goal: Absence of Fall  Outcome: Ongoing (interventions implemented as appropriate)      Problem: Pain, Chronic (Adult)  Goal: Acceptable Pain/Comfort Level and Functional Ability  Outcome: Ongoing (interventions implemented as appropriate)      Problem: Chronic Obstructive Pulmonary Disease (Adult)  Goal: Signs and Symptoms of Listed Potential Problems Will be Absent, Minimized or Managed (Chronic Obstructive Pulmonary Disease)  Outcome: Ongoing (interventions implemented as appropriate)

## 2019-06-15 NOTE — THERAPY TREATMENT NOTE
Acute Care - Physical Therapy Treatment Note  [facility]     Patient Name: Maryjo Wynn  : 1945  MRN: [id number]  Today's Date: 6/15/2019  Onset of Illness/Injury or Date of Surgery: 19     Referring Physician: Desiree    Admit Date: 2019    Visit Dx:    ICD-10-CM ICD-9-CM   1. Dislocation of left hip, initial encounter (CMS/Formerly [facility]) S73.005A 835.00   2. Dislocation of internal left hip prosthesis (CMS/Formerly [facility]) T84.021A 996.42     V43.64   3. Difficulty walking R26.2 719.7     Patient Active Problem List   Diagnosis   • Degenerative joint disease (DJD) of hip   • PNA (pneumonia)   • COPD (chronic obstructive pulmonary disease) (CMS/Formerly [facility])   • Acute on chronic respiratory failure with hypoxia and hypercapnia (CMS/Formerly [facility])   • Chronic diastolic CHF (congestive heart failure) (CMS/Formerly [facility])   • CAD (coronary artery disease)   • Asymptomatic carotid artery stenosis   • Atherosclerosis of native artery of both lower extremities with intermittent claudication (CMS/Formerly [facility])   • CAD (coronary artery disease), autologous vein bypass graft   • Chronic hypoxemic respiratory failure (CMS/Formerly [facility])   • Echocardiogram abnormal   • Fusion of spine of lumbar region   • GI bleed   • History of fusion of lumbar spine   • Hx of CABG   • Postoperative atrial fibrillation (CMS/Formerly [facility])   • Paroxysmal atrial fibrillation (CMS/Formerly [facility])   • PUD (peptic ulcer disease)   • PVD (peripheral vascular disease) (CMS/Formerly [facility])   • S/P femoral-femoral bypass surgery   • Statin intolerance   • TR (tricuspid regurgitation)   • Superior mesenteric artery stenosis (CMS/Formerly [facility])   • Closed dislocation of left hip (CMS/Formerly [facility])   • Hip dislocation, left (CMS/Formerly [facility])       Therapy Treatment    Rehabilitation Treatment Summary     Row Name 06/15/19 1522             Treatment Time/Intention    Discipline  physical therapist  -PC      Document Type  therapy note (daily note)  -PC      Subjective Information  complains of;pain;dyspnea  -PC      Mode of Treatment  physical therapy  -PC       Patient/Family Observations  pt is in bed, no acute distress, on 2L o2  -PC      Therapy Frequency (PT Clinical Impression)  daily  -PC      Patient Effort  good  -PC      Existing Precautions/Restrictions  fall;hip  -PC      Recorded by [PC] Jaquelin Sexton, PT 06/15/19 1526      Row Name 06/15/19 1522             Vital Signs    Pre SpO2 (%)  94  -PC      O2 Delivery Pre Treatment  supplemental O2  -PC      Intra SpO2 (%)  93  -PC      O2 Delivery Intra Treatment  supplemental O2  -PC      Post SpO2 (%)  94  -PC      O2 Delivery Post Treatment  supplemental O2  -PC      Recorded by [PC] Jaquelin Sexton, PT 06/15/19 1526      Row Name 06/15/19 1522             Cognitive Assessment/Intervention- PT/OT    Orientation Status (Cognition)  oriented x 3  -PC      Follows Commands (Cognition)  WNL  -PC      Recorded by [PC] Jaquelin Sexton, PT 06/15/19 1526      Row Name 06/15/19 1522             Mobility Assessment/Intervention    Extremity Weight-bearing Status  left lower extremity  -PC      Left Lower Extremity (Weight-bearing Status)  weight-bearing as tolerated (WBAT)  -PC      Recorded by [PC] Jaquelin Sexton, PT 06/15/19 1526      Row Name 06/15/19 1522             Bed Mobility Assessment/Treatment    Bed Mobility Assessment/Treatment  supine-sit;sit-supine  -PC      Supine-Sit [place] (Bed Mobility)  minimum assist (75% patient effort);verbal cues;nonverbal cues (demo/gesture)  -PC      Assistive Device (Bed Mobility)  bed rails;head of bed elevated  -PC      Recorded by [PC] Jaquelin Sexton, PT 06/15/19 1526      Row Name 06/15/19 1522             Transfer Assessment/Treatment    Transfer Assessment/Treatment  sit-stand transfer;stand-sit transfer  -PC      Recorded by [PC] Jaquelin Sexton, PT 06/15/19 1526      Row Name 06/15/19 1522             Sit-Stand Transfer    Sit-Stand [place] (Transfers)  minimum assist (75% patient effort)  -PC      Assistive Device (Sit-Stand Transfers)  walker,  front-wheeled  -PC      Recorded by [PC] Jaquelin Sexton, PT 06/15/19 1526      Row Name 06/15/19 1522             Stand-Sit Transfer    Stand-Sit [place] (Transfers)  minimum assist (75% patient effort)  -PC      Assistive Device (Stand-Sit Transfers)  walker, front-wheeled  -PC      Recorded by [PC] Jaquelin Sexton, PT 06/15/19 1526      Row Name 06/15/19 1522             Gait/Stairs Assessment/Training    Gait/Stairs Assessment/Training  gait/ambulation assistive device  -PC      [place] Level (Gait)  minimum assist (75% patient effort)  -PC      Assistive Device (Gait)  walker, front-wheeled  -PC      Distance in Feet (Gait)  25 ft, 3 standing rests required  -PC      Pattern (Gait)  step-to  -PC      Deviations/Abnormal Patterns (Gait)  antalgic;leonor decreased;gait speed decreased;stride length decreased  -PC      Bilateral Gait Deviations  forward flexed posture  -PC      Recorded by [PC] Jaquelin Sexton, PT 06/15/19 1526      Row Name 06/15/19 1522             Therapeutic Exercise    Comment (Therapeutic Exercise)  seated AP, LAQ  -PC      Recorded by [PC] Jaquelin Sexton, PT 06/15/19 1529      Row Name 06/15/19 1522             Pain Assessment    Additional Documentation  Pain Scale: Numbers Pre/Post-Treatment (Group);Pain Scale: Word Pre/Post-Treatment (Group)  -PC      Recorded by [PC] Jaquelin Sexton, PT 06/15/19 1529      Row Name 06/15/19 1522             Pain Scale: Numbers Pre/Post-Treatment    Pain Scale: Numbers, Pretreatment  9/10  -PC      Pain Scale: Numbers, Post-Treatment  9/10  -PC      Pain Location - Side  Left  -PC2      Pain Location - Orientation  incisional  -PC2      Pain Location  hip  -PC2      Recorded by [PC] Jaquelin Sexton, PT 06/15/19 1529  [PC2] Jaquelin Sexton, PT 06/15/19 1526      Row Name 06/15/19 1522             Pain Scale: FACES Pre/Post-Treatment    Pain: FACES Scale, Pretreatment  --  -PC      Pain: FACES Scale, Post-Treatment  --  -PC      Recorded by [PC]  Jaquelin Sexton, PT 06/15/19 1529      Row Name                Wound 06/12/19 1754 Left hip incision    Wound - Properties Group Date first assessed: 06/12/19 [HH] Time first assessed: 1754 [HH] Side: Left [HH] Location: hip [HH] Type: incision [HH] Recorded by:  [HH] Sofiya Vega RN 06/12/19 1754    Row Name                Wound 06/12/19 1924 Right hand skin tear    Wound - Properties Group Date first assessed: 06/12/19 [KD] Time first assessed: 1924 [KD] Present On Admission : yes [KD] Side: Right [KD] Location: hand [KD] Type: skin tear [KD] Recorded by:  [KD] Jacike Johnson RN 06/12/19 1925    Row Name                Wound 06/12/19 1926 Left lateral arm skin tear    Wound - Properties Group Date first assessed: 06/12/19 [KD] Time first assessed: 1926 [KD] Present On Admission : yes [KD] Side: Left [KD] Orientation: lateral [KD] Location: arm [KD] Type: skin tear [KD] Recorded by:  [KD] Jackie Johnson RN 06/12/19 1926    Row Name 06/15/19 1522             Coping    Observed Emotional State  accepting;calm;cooperative  -PC      Verbalized Emotional State  acceptance  -PC      Recorded by [PC] Jaquelin Sexton, PT 06/15/19 1526      Row Name 06/15/19 1522             Plan of Care Review    Plan of Care Reviewed With  patient  -PC      Recorded by [PC] Jaquelin Sexton, PT 06/15/19 1529      Row Name 06/15/19 1522             Outcome Summary/Treatment Plan (PT)    Anticipated Discharge Disposition (PT)  home with assist;home with home health  -PC      Recorded by [PC] Jaquelin Sexton, PT 06/15/19 1526        User Key  (r) = Recorded By, (t) = Taken By, (c) = Cosigned By    Initials Name Effective Dates Discipline    PC Jaquelin Sexton, PT 04/03/18 -  PT    Jackie Tong RN 11/02/18 -  Nurse     Sofiya Vega RN 06/16/16 -  Nurse          Wound 06/12/19 1754 Left hip incision (Active)   Dressing Appearance moist drainage 6/15/2019  8:25 AM   Closure Staples 6/15/2019  1:35 PM    Base clean 6/15/2019  1:35 PM   Drainage Characteristics/Odor bleeding controlled 6/15/2019  8:25 AM   Drainage Amount large 6/15/2019  8:25 AM   Dressing Care, Wound dressing changed;low-adherent 6/15/2019  1:35 PM       Wound 06/12/19 1924 Right hand skin tear (Active)   Dressing Appearance dry;intact 6/15/2019  1:35 PM   Closure DRAGAN 6/15/2019  1:35 PM   Base dressing in place, unable to visualize 6/15/2019  1:35 PM   Drainage Amount none 6/15/2019 12:01 AM   Dressing Care, Wound gauze 6/15/2019  1:35 PM       Wound 06/12/19 1926 Left lateral arm skin tear (Active)   Dressing Appearance open to air 6/15/2019  1:35 PM   Closure Open to air 6/15/2019  1:35 PM   Base dry;clean 6/15/2019 12:01 AM   Drainage Amount none 6/15/2019 12:01 AM   Dressing Care, Wound open to air 6/15/2019  1:35 PM           Physical Therapy Education     Title: PT OT SLP Therapies (Done)     Topic: Physical Therapy (Done)     Point: Mobility training (Done)     Learning Progress Summary           Patient Acceptance, E,D, DU,NR by  at 6/15/2019  3:29 PM    Acceptance, E,TB, VU by  at 6/13/2019  1:52 PM                   Point: Home exercise program (Done)     Learning Progress Summary           Patient Acceptance, E,D, DU,NR by  at 6/15/2019  3:29 PM    Acceptance, E,TB, VU by  at 6/13/2019  1:52 PM                   Point: Body mechanics (Done)     Learning Progress Summary           Patient Acceptance, E,D, DU,NR by  at 6/15/2019  3:29 PM    Acceptance, E,TB, VU by  at 6/13/2019  1:52 PM                   Point: Precautions (Done)     Learning Progress Summary           Patient Acceptance, E,D, DU,NR by  at 6/15/2019  3:29 PM    Acceptance, E,TB, VU by  at 6/13/2019  1:52 PM                               User Key     Initials Effective Dates Name Provider Type Discipline     04/03/18 -  Jaquelin Sexton, PT Physical Therapist PT    CS 05/14/18 -  Doroteo Gomes, PT Physical Therapist PT                PT Recommendation  and Plan  Anticipated Discharge Disposition (PT): home with assist, home with home health  Therapy Frequency (PT Clinical Impression): daily  Outcome Summary/Treatment Plan (PT)  Anticipated Discharge Disposition (PT): home with assist, home with home health  Plan of Care Reviewed With: patient  Progress: improving  Outcome Summary: pt able to walk farther but fatigues quickly and required frequent standing rests, gait is antalgic, pt requ min assist to walk 25 ft, pt plans home with assist at d/c  Outcome Measures     Row Name 06/15/19 1500 06/13/19 1300          How much help from another person do you currently need...    Turning from your back to your side while in flat bed without using bedrails?  3  -PC  3  -CS     Moving from lying on back to sitting on the side of a flat bed without bedrails?  3  -PC  3  -CS     Moving to and from a bed to a chair (including a wheelchair)?  3  -PC  3  -CS     Standing up from a chair using your arms (e.g., wheelchair, bedside chair)?  3  -PC  3  -CS     Climbing 3-5 steps with a railing?  3  -PC  3  -CS     To walk in hospital room?  3  -PC  3  -CS     AM-PAC 6 Clicks Score  18  -PC  18  -CS        Functional Assessment    Outcome Measure Options  --  AM-PAC 6 Clicks Basic Mobility (PT)  -CS       User Key  (r) = Recorded By, (t) = Taken By, (c) = Cosigned By    Initials Name Provider Type    PC Jaquelin Sexton, PT Physical Therapist    Doroteo Gu, PT Physical Therapist         Time Calculation:   PT Charges     Row Name 06/15/19 1532             Time Calculation    Start Time  1500  -PC      Stop Time  1515  -PC      Time Calculation (min)  15 min  -PC      PT Received On  06/15/19  -PC      PT - Next Appointment  06/16/19  -PC        User Key  (r) = Recorded By, (t) = Taken By, (c) = Cosigned By    Initials Name Provider Type    Jaquelin Acosta PT Physical Therapist        Therapy Charges for Today     Code Description Service Date Service Provider Modifiers  Qty    [id number]  PT THER PROC EA 15 MIN 6/15/2019 Jaquelin Sexton, PT GP 1          PT G-Codes  Outcome Measure Options: AM-PAC 6 Clicks Basic Mobility (PT)  AM-PAC 6 Clicks Score: 18    Jaquelin Sexton, PT  6/15/2019

## 2019-06-15 NOTE — PLAN OF CARE
Problem: Patient Care Overview  Goal: Plan of Care Review  Outcome: Ongoing (interventions implemented as appropriate)   06/15/19 4115   Coping/Psychosocial   Plan of Care Reviewed With patient   Plan of Care Review   Progress improving   OTHER   Outcome Summary Vitals stable. Pain controlled. Up to chair with staff. MARLINE dressing removed - dry dressing applied. Hem stable. Worked with skilled PT. Will continue to monitor,        Problem: Skin Injury Risk (Adult)  Goal: Skin Health and Integrity  Outcome: Ongoing (interventions implemented as appropriate)      Problem: Fall Risk (Adult)  Goal: Absence of Fall  Outcome: Ongoing (interventions implemented as appropriate)      Problem: Pain, Chronic (Adult)  Goal: Acceptable Pain/Comfort Level and Functional Ability  Outcome: Ongoing (interventions implemented as appropriate)      Problem: Chronic Obstructive Pulmonary Disease (Adult)  Goal: Signs and Symptoms of Listed Potential Problems Will be Absent, Minimized or Managed (Chronic Obstructive Pulmonary Disease)  Outcome: Ongoing (interventions implemented as appropriate)

## 2019-06-16 NOTE — THERAPY TREATMENT NOTE
Acute Care - Physical Therapy Treatment Note  [facility]     Patient Name: Maryjo Wynn  : 1945  MRN: [id number]  Today's Date: 2019  Onset of Illness/Injury or Date of Surgery: 19     Referring Physician: Desiree    Admit Date: 2019    Visit Dx:    ICD-10-CM ICD-9-CM   1. Dislocation of left hip, initial encounter (CMS/[facility]) S73.005A 835.00   2. Dislocation of internal left hip prosthesis (CMS/[facility]) T84.021A 996.42     V43.64   3. Difficulty walking R26.2 719.7     Patient Active Problem List   Diagnosis   • Degenerative joint disease (DJD) of hip   • PNA (pneumonia)   • COPD (chronic obstructive pulmonary disease) (CMS/[facility])   • Acute on chronic respiratory failure with hypoxia and hypercapnia (CMS/[facility])   • Chronic diastolic CHF (congestive heart failure) (CMS/[facility])   • CAD (coronary artery disease)   • Asymptomatic carotid artery stenosis   • Atherosclerosis of native artery of both lower extremities with intermittent claudication (CMS/[facility])   • CAD (coronary artery disease), autologous vein bypass graft   • Chronic hypoxemic respiratory failure (CMS/[facility])   • Echocardiogram abnormal   • Fusion of spine of lumbar region   • GI bleed   • History of fusion of lumbar spine   • Hx of CABG   • Postoperative atrial fibrillation (CMS/[facility])   • Paroxysmal atrial fibrillation (CMS/[facility])   • PUD (peptic ulcer disease)   • PVD (peripheral vascular disease) (CMS/[facility])   • S/P femoral-femoral bypass surgery   • Statin intolerance   • TR (tricuspid regurgitation)   • Superior mesenteric artery stenosis (CMS/[facility])   • Closed dislocation of left hip (CMS/[facility])   • Hip dislocation, left (CMS/[facility])       Therapy Treatment    Rehabilitation Treatment Summary     Row Name 19 1342             Treatment Time/Intention    Discipline  physical therapist  -CS      Document Type  therapy note (daily note)  -CS      Subjective Information  complains of;pain;dyspnea  -CS      Mode of Treatment  physical therapy  -CS       Patient/Family Observations  pt supine in bed, no acute distress  -CS      Therapy Frequency (PT Clinical Impression)  daily  -CS      Patient Effort  good  -CS      Existing Precautions/Restrictions  fall;hip  -CS      Equipment Issued to Patient  walker, front wheeled;gait belt  -CS      Recorded by [CS] Doroteo Gomes, PT 06/16/19 1344      Row Name 06/16/19 1342             Vital Signs    Pre SpO2 (%)  92  -CS      O2 Delivery Pre Treatment  supplemental O2  -CS      Intra SpO2 (%)  87  -CS      O2 Delivery Intra Treatment  supplemental O2  -CS      Post SpO2 (%)  92  -CS      O2 Delivery Post Treatment  supplemental O2  -CS      Recorded by [CS] Doroteo Gomes, PT 06/16/19 1344      Row Name 06/16/19 1342             Cognitive Assessment/Intervention- PT/OT    Orientation Status (Cognition)  oriented x 3  -CS      Follows Commands (Cognition)  WNL  -CS      Safety Deficit (Cognitive)  mild deficit;insight into deficits/self awareness  -CS      Recorded by [CS] Doroteo Gomes, PT 06/16/19 1344      Row Name 06/16/19 1342             Safety Issues, Functional Mobility    Safety Issues Affecting Function (Mobility)  insight into deficits/self awareness  -CS      Recorded by [CS] Doroteo Gomes, PT 06/16/19 1344      Row Name 06/16/19 1342             Mobility Assessment/Intervention    Extremity Weight-bearing Status  left lower extremity  -CS      Left Lower Extremity (Weight-bearing Status)  weight-bearing as tolerated (WBAT)  -CS      Recorded by [CS] Doroteo Gomes, PT 06/16/19 1344      Row Name 06/16/19 1342             Bed Mobility Assessment/Treatment    Bed Mobility Assessment/Treatment  supine-sit  -CS      Supine-Sit [place] (Bed Mobility)  minimum assist (75% patient effort);verbal cues;nonverbal cues (demo/gesture)  -CS      Assistive Device (Bed Mobility)  bed rails;head of bed elevated  -CS      Recorded by [CS] Doroteo Gomes, PT 06/16/19 1344      Row Name 06/16/19 1342              Transfer Assessment/Treatment    Transfer Assessment/Treatment  sit-stand transfer;stand-sit transfer  -CS      Recorded by [CS] Doroteo Gomes, PT 06/16/19 1344      Row Name 06/16/19 1342             Sit-Stand Transfer    Sit-Stand [place] (Transfers)  minimum assist (75% patient effort)  -CS      Assistive Device (Sit-Stand Transfers)  walker, front-wheeled  -CS      Recorded by [CS] Doroteo Gomes, PT 06/16/19 1344      Row Name 06/16/19 1342             Stand-Sit Transfer    Stand-Sit [place] (Transfers)  minimum assist (75% patient effort)  -CS      Assistive Device (Stand-Sit Transfers)  walker, front-wheeled  -CS      Recorded by [CS] Doroteo Gomes, PT 06/16/19 1344      Row Name 06/16/19 1342             Gait/Stairs Assessment/Training    Gait/Stairs Assessment/Training  gait/ambulation assistive device  -CS      Logan Level (Gait)  minimum assist (75% patient effort)  -CS      Assistive Device (Gait)  walker, front-wheeled  -CS      Distance in Feet (Gait)  30, 2 standing rest breaks  -CS      Pattern (Gait)  step-to  -CS      Deviations/Abnormal Patterns (Gait)  antalgic;leonor decreased;gait speed decreased;stride length decreased  -CS      Bilateral Gait Deviations  forward flexed posture  -CS      Recorded by [CS] Doroteo Gomes, PT 06/16/19 1344      Row Name 06/16/19 1342             Therapeutic Exercise    Comment (Therapeutic Exercise)  AP, LAQ x10  -CS      Recorded by [CS] Doroteo Gomes, PT 06/16/19 1344      Row Name 06/16/19 1342             Positioning and Restraints    Pre-Treatment Position  in bed  -CS      Post Treatment Position  bed  -CS      Recorded by [CS] Doroteo Gomes, PT 06/16/19 1344      Row Name 06/16/19 1342             Pain Scale: Numbers Pre/Post-Treatment    Pain Location - Orientation  incisional  -CS      Pain Location  hip  -CS      Pain Intervention(s)  Repositioned;Ambulation/increased activity  -CS      Recorded by [CS] Eliseo  Doroteo BARRY, PT 06/16/19 1344      Row Name 06/16/19 1342             Pain Scale: FACES Pre/Post-Treatment    Pain: FACES Scale, Pretreatment  4-->hurts little more  -CS      Pain: FACES Scale, Post-Treatment  6-->hurts even more  -CS      Recorded by [CS] Doroteo Gomes, PT 06/16/19 1344      Row Name                Wound 06/12/19 1754 Left hip incision    Wound - Properties Group Date first assessed: 06/12/19 [HH] Time first assessed: 1754 [HH] Side: Left [HH] Location: hip [HH] Type: incision [HH] Recorded by:  [HH] Sofiya Vega RN 06/12/19 1754    Row Name                Wound 06/12/19 1924 Right hand skin tear    Wound - Properties Group Date first assessed: 06/12/19 [KD] Time first assessed: 1924 [KD] Present On Admission : yes [KD] Side: Right [KD] Location: hand [KD] Type: skin tear [KD] Recorded by:  [KD] Jackie Johnson RN 06/12/19 1925    Row Name                Wound 06/12/19 1926 Left lateral arm skin tear    Wound - Properties Group Date first assessed: 06/12/19 [KD] Time first assessed: 1926 [KD] Present On Admission : yes [KD] Side: Left [KD] Orientation: lateral [KD] Location: arm [KD] Type: skin tear [KD] Recorded by:  [KD] Jackie Johnson RN 06/12/19 1926    Row Name 06/16/19 1342             Coping    Observed Emotional State  anxious;cooperative  -CS      Verbalized Emotional State  acceptance  -CS      Recorded by [CS] Doroteo Gomes, PT 06/16/19 1344      Row Name 06/16/19 1342             Plan of Care Review    Plan of Care Reviewed With  patient  -CS      Recorded by [CS] Doroteo Gomes, PT 06/16/19 1344      Row Name 06/16/19 1342             Outcome Summary/Treatment Plan (PT)    Anticipated Discharge Disposition (PT)  home with assist;home with home health  -CS      Recorded by [CS] Doroteo Gomes, PT 06/16/19 1344        User Key  (r) = Recorded By, (t) = Taken By, (c) = Cosigned By    Initials Name Effective Dates Discipline    Jackie Tong, RN  11/02/18 -  Nurse    Sofiya Zamorano RN 06/16/16 -  Nurse    Doroteo Gu, PT 05/14/18 -  PT          Wound 06/12/19 1754 Left hip incision (Active)   Dressing Appearance dry;intact 6/16/2019  8:12 AM   Closure Staples 6/16/2019  8:12 AM   Base clean;dry 6/16/2019  8:12 AM   Drainage Amount large 6/16/2019 10:40 AM   Care, Wound cleansed with;soap and water 6/16/2019 10:40 AM   Dressing Care, Wound dressing changed;border dressing 6/16/2019 10:40 AM   Periwound Care, Wound dry periwound area maintained 6/16/2019 10:40 AM       Wound 06/12/19 1924 Right hand skin tear (Active)   Dressing Appearance open to air 6/16/2019  8:12 AM   Closure DRAGAN 6/16/2019 12:05 AM   Base dressing in place, unable to visualize 6/16/2019 12:05 AM   Drainage Amount none 6/16/2019 12:05 AM   Care, Wound cleansed with;soap and water 6/16/2019  8:12 AM   Dressing Care, Wound open to air 6/16/2019  8:12 AM       Wound 06/12/19 1926 Left lateral arm skin tear (Active)   Dressing Appearance open to air 6/16/2019  8:12 AM   Closure Open to air 6/16/2019 12:05 AM   Base clean;dry 6/16/2019 12:05 AM   Drainage Amount none 6/16/2019 12:05 AM   Care, Wound cleansed with;soap and water 6/16/2019  8:12 AM   Dressing Care, Wound open to air 6/16/2019  8:12 AM           Physical Therapy Education     Title: PT OT SLP Therapies (Done)     Topic: Physical Therapy (Done)     Point: Mobility training (Done)     Learning Progress Summary           Patient Acceptance, E,TB, VU by CS at 6/16/2019  1:45 PM    Acceptance, E,D, DU,NR by PC at 6/15/2019  3:29 PM    Acceptance, E,TB, VU by CS at 6/13/2019  1:52 PM                   Point: Home exercise program (Done)     Learning Progress Summary           Patient Acceptance, E,TB, VU by CS at 6/16/2019  1:45 PM    Acceptance, E,D, DU,NR by PC at 6/15/2019  3:29 PM    Acceptance, EMICKEY, VU by CS at 6/13/2019  1:52 PM                   Point: Body mechanics (Done)     Learning Progress Summary            Patient Acceptance, E,TB, VU by  at 6/16/2019  1:45 PM    Acceptance, E,D, DU,NR by  at 6/15/2019  3:29 PM    Acceptance, E,TB, VU by  at 6/13/2019  1:52 PM                   Point: Precautions (Done)     Learning Progress Summary           Patient Acceptance, E,TB, VU by  at 6/16/2019  1:45 PM    Acceptance, E,D, DU,NR by  at 6/15/2019  3:29 PM    Acceptance, E,TB, VU by  at 6/13/2019  1:52 PM                               User Key     Initials Effective Dates Name Provider Type Discipline     04/03/18 -  Jaquelin Sexton, PT Physical Therapist PT     05/14/18 -  Doroteo Gomes, PT Physical Therapist PT                PT Recommendation and Plan  Anticipated Discharge Disposition (PT): home with assist, home with home health  Planned Therapy Interventions (PT Eval): balance training, bed mobility training, gait training, home exercise program, transfer training, stair training  Therapy Frequency (PT Clinical Impression): daily  Outcome Summary/Treatment Plan (PT)  Anticipated Discharge Disposition (PT): home with assist, home with home health  Plan of Care Reviewed With: patient  Outcome Summary: Pt walked to the door and back to bed with 2 standing rest breaks, min-modA with mobility. O2 87% after, 1 minute recovery to >90%. Tolerated with mild complaints of shortness of air.   Outcome Measures     Row Name 06/16/19 1300 06/15/19 1500          How much help from another person do you currently need...    Turning from your back to your side while in flat bed without using bedrails?  3  -CS  3  -PC     Moving from lying on back to sitting on the side of a flat bed without bedrails?  3  -CS  3  -PC     Moving to and from a bed to a chair (including a wheelchair)?  3  -CS  3  -PC     Standing up from a chair using your arms (e.g., wheelchair, bedside chair)?  3  -CS  3  -PC     Climbing 3-5 steps with a railing?  3  -CS  3  -PC     To walk in hospital room?  3  -CS  3  -PC     AM-PAC 6 Clicks Score   18  -CS  18  -PC        Functional Assessment    Outcome Measure Options  AM-PAC 6 Clicks Basic Mobility (PT)  -CS  --       User Key  (r) = Recorded By, (t) = Taken By, (c) = Cosigned By    Initials Name Provider Type    PC Jaquelin Sexton, PT Physical Therapist    CS Doroteo Gomes, PT Physical Therapist         Time Calculation:   PT Charges     Row Name 06/16/19 1348             Time Calculation    Start Time  1527  -CS      Stop Time  1542  -CS      Time Calculation (min)  15 min  -CS      PT Received On  06/16/19  -CS      PT - Next Appointment  06/17/19  -CS        User Key  (r) = Recorded By, (t) = Taken By, (c) = Cosigned By    Initials Name Provider Type    CS Doroteo Gomes, PT Physical Therapist        Therapy Charges for Today     Code Description Service Date Service Provider Modifiers Qty    [id number] HC PT THER PROC EA 15 MIN 6/16/2019 Doroteo Gomes, PT GP 1          PT G-Codes  Outcome Measure Options: AM-PAC 6 Clicks Basic Mobility (PT)  AM-PAC 6 Clicks Score: 18    Doroteo Gomes PT  6/16/2019

## 2019-06-16 NOTE — PLAN OF CARE
Problem: Patient Care Overview  Goal: Plan of Care Review  Outcome: Ongoing (interventions implemented as appropriate)   06/16/19 4683   Coping/Psychosocial   Plan of Care Reviewed With patient   Plan of Care Review   Progress improving   OTHER   Outcome Summary VSS; PO prn pain med given twice; encouraged to turn; continue to monitor.     Goal: Individualization and Mutuality  Outcome: Ongoing (interventions implemented as appropriate)    Goal: Discharge Needs Assessment  Outcome: Ongoing (interventions implemented as appropriate)    Goal: Interprofessional Rounds/Family Conf  Outcome: Ongoing (interventions implemented as appropriate)      Problem: Skin Injury Risk (Adult)  Goal: Skin Health and Integrity  Outcome: Ongoing (interventions implemented as appropriate)      Problem: Fall Risk (Adult)  Goal: Absence of Fall  Outcome: Ongoing (interventions implemented as appropriate)      Problem: Pain, Chronic (Adult)  Goal: Acceptable Pain/Comfort Level and Functional Ability  Outcome: Ongoing (interventions implemented as appropriate)      Problem: Chronic Obstructive Pulmonary Disease (Adult)  Goal: Signs and Symptoms of Listed Potential Problems Will be Absent, Minimized or Managed (Chronic Obstructive Pulmonary Disease)  Outcome: Ongoing (interventions implemented as appropriate)

## 2019-06-16 NOTE — PROGRESS NOTES
Jose Tomlinson MD                          [phone]      Patient ID:    Name:  Maryjo Wynn    MRN:  [id number]    1945   74 y.o.  female            Patient Care Team:  Rashi Ogden MD as PCP - General (Family Medicine)  Linda Conde MD as Consulting Physician (Obstetrics and Gynecology)  Frank Staton MD as Consulting Physician (Pulmonary Disease)  Ryan Diaz MD as Consulting Physician (Interventional Cardiology)    CC/ Reason for visit: Per Assessment mentioned below    Subjective: Pt seen and examined this AM. No acute overnight events noted. Doing better.  Stable oxygen requirements.  Likes the PAP device here. Rectocele bothering her.     ROS: Denies any subjective fevers, chest pain, nausea/ vomiting.  No new worsening cough or shortness of breath    Objective     Vital Signs past 24hrs    BP range: BP: (111-159)/(65-93) 146/85  Pulse range: Heart Rate:  [75-99] 89  Resp rate range: Resp:  [16-18] 16  Temp range: Temp (24hrs), Av.4 °F (36.9 °C), Min:98.1 °F (36.7 °C), Max:98.7 °F (37.1 °C)      Ventilator/Non-Invasive Ventilation Settings (From admission, onward)    Start     Ordered    19  NIPPV (CPAP or BIPAP)  At Bedtime & As Needed-RT     Question Answer Comment   Indication: Acute Respiratory Failure    Type: AVAPS/PC/PS    NIPPV Mask Interface: Full Face Mask    Backup Rate 16    Target VT (mL) 500    EPAP/PEEP (cm H2O) 5    Min Pressure (cm H2O) 12    Max Pressure (cm H2O) 28    Titrate for SPO2 90%        19          Device (Oxygen Therapy): nasal cannula FiO2 (%): 28 %     50.8 kg (112 lb); Body mass index is 21.87 kg/m².      Intake/Output Summary (Last 24 hours) at 2019 1545  Last data filed at 2019 1347  Gross per 24 hour   Intake 200 ml   Output 450 ml   Net -250 ml       PHYSICAL EXAM   Constitutional: Middle aged pt in chair, No acute respiratory distress, + accessory muscle  use  Head: - NCAT  Eyes: No pallor, Anicteric conjunctiva, EOMI.  ENMT:  Mallampati 4, no oral thrush. No palpable cervical lymphadenopathy, Dry MM.   Heart: RRR, no murmur. No pedal edema   Lungs: ASHLEY +, Distant BS, No wheezes/ crackles heard    Abdomen: Soft. No tenderness, guarding or rigidity. No palpable masses  Extremities: Extremities warm and well perfused. Surgical changes @ hip.  Neuro: Conscious, answers appropriately, no gross focal neuro deficits  Psych: Mood and affect appropriate    Scheduled meds:      arformoterol 15 mcg Nebulization BID - RT   aspirin 81 mg Oral Daily   budesonide 0.5 mg Nebulization BID - RT   busPIRone 10 mg Oral BID   carvedilol 6.25 mg Oral BID   cephalexin 500 mg Oral Q12H   dicyclomine 10 mg Oral TID   ferrous sulfate 325 mg Oral Daily With Breakfast   furosemide 40 mg Oral Daily   guaiFENesin 1,200 mg Oral Q12H   losartan 25 mg Oral Daily   pantoprazole 40 mg Oral Q AM   potassium chloride 20 mEq Oral BID   predniSONE 10 mg Oral Daily   roflumilast 500 mcg Oral Daily   sertraline 50 mg Oral Daily   sucralfate 500 mg Oral 4x Daily PC & at Bedtime   tiotropium bromide monohydrate 2 puff Inhalation Daily - RT       IV meds:                           Data Review:      Results from last 7 days   Lab Units 06/16/19  0416 06/15/19  0440 06/14/19  0325   SODIUM mmol/L 139 139 136   POTASSIUM mmol/L 4.0 4.7 4.4   CHLORIDE mmol/L 90* 92* 92*   CO2 mmol/L 37.9* 39.6* 37.8*   BUN mg/dL 6* 6* 8   CREATININE mg/dL 0.27* 0.25* 0.35*   CALCIUM mg/dL 8.4* 8.6 8.4*   GLUCOSE mg/dL 80 89 97   WBC 10*3/mm3 10.71 11.60* 11.30*   HEMOGLOBIN g/dL 10.2* 9.9* 6.5*   PLATELETS 10*3/mm3 228 205 197       Lab Results   Component Value Date    CALCIUM 8.4 (L) 06/16/2019       Results from last 7 days   Lab Units 06/12/19  1637   WOUNDCX  No growth at 3 days       Results from last 7 days   Lab Units 06/12/19  2251   PH, ARTERIAL pH units 7.324*   PO2 ART mm Hg 115.5*   PCO2, ARTERIAL mm Hg 87.0*   HCO3  ART mmol/L 45.2*        Results Review:    I have reviewed the relevant laboratory results and reviewed the chest imaging from the last 3 days personally and summarized it below    Assessment    S/p Lt Revision THR 6/12   COPD not in exac   Chronic high-dose steroids  Chronic hypoxemic respiratory failure  Chronic hypercapnic respiratory failure  CAD    PLAN:  Continue with the current nebulizer regimen.  Dec pred to 10 for now and f/u her pulm   Patient on supplemental oxygen at baseline levels.  Doing well on lower Tv @ 400 with AVAPS. On bipap @ home and says that she uses every night. Will let her be on it for now z@ dc.    Ok to dc from my end    I have discussed my findings and recommendations with patient, family and nursing staff.     Jose Tomlinson MD  6/16/2019

## 2019-06-16 NOTE — PLAN OF CARE
Problem: Patient Care Overview  Goal: Plan of Care Review  Outcome: Ongoing (interventions implemented as appropriate)   06/16/19 8224   Coping/Psychosocial   Plan of Care Reviewed With patient   Plan of Care Review   Progress improving   OTHER   Outcome Summary hip dressing changed x3, abx switched to PO, pulm signed off, prob dc in am, vss, will continue to monitor      Goal: Individualization and Mutuality  Outcome: Ongoing (interventions implemented as appropriate)    Goal: Discharge Needs Assessment  Outcome: Ongoing (interventions implemented as appropriate)    Goal: Interprofessional Rounds/Family Conf  Outcome: Ongoing (interventions implemented as appropriate)      Problem: Skin Injury Risk (Adult)  Goal: Skin Health and Integrity  Outcome: Ongoing (interventions implemented as appropriate)      Problem: Fall Risk (Adult)  Goal: Absence of Fall  Outcome: Ongoing (interventions implemented as appropriate)      Problem: Pain, Chronic (Adult)  Goal: Acceptable Pain/Comfort Level and Functional Ability  Outcome: Ongoing (interventions implemented as appropriate)      Problem: Chronic Obstructive Pulmonary Disease (Adult)  Goal: Signs and Symptoms of Listed Potential Problems Will be Absent, Minimized or Managed (Chronic Obstructive Pulmonary Disease)  Outcome: Ongoing (interventions implemented as appropriate)

## 2019-06-16 NOTE — PLAN OF CARE
Problem: Patient Care Overview  Goal: Plan of Care Review  Outcome: Ongoing (interventions implemented as appropriate)   06/16/19 8987   Coping/Psychosocial   Plan of Care Reviewed With patient   OTHER   Outcome Summary Pt walked to the door and back to bed with 2 standing rest breaks, min-modA with mobility. O2 87% after, 1 minute recovery to >90%. Tolerated with mild complaints of shortness of air.

## 2019-06-16 NOTE — PROGRESS NOTES
Orthopaedic Surgery   Daily Progress Note  Dr. IRMA Ramírez II  [phone]  DEMOGRAPHICS:   · Maryjo Wynn   · Age:74 y.o.   · MRN:[id number]  · Admitted: 6/12/2019    PROCEDURE: 4 Days Post-Op s/p Procedure(s):  LT. HIP OPEN REDUCTION REVISION     HOSPITAL PROGRESS  · Patient Issues: No major events, feeling okay at this time  · Ambulation/Activity: Did well with therapy yesterday and increased her mobilization distance    VITALS:  Vitals:    06/15/19 2045 06/15/19 2306 06/15/19 2349 06/16/19 0721   BP: 144/93  111/65 156/75   BP Location:   Left arm Left arm   Patient Position:   Lying Lying   Pulse: 98 76 79 99   Resp:  16 16 16   Temp:   98.7 °F (37.1 °C) 98.3 °F (36.8 °C)   TempSrc:   Oral Oral   SpO2:  97% 97%    Weight:       Height:           PHYSICAL EXAM:  · CONSTITUTIONAL: A&Ox3, No acute distress  · LUNGS: Equal chest rise, no shortness of air  · CARDIOVASCULAR: palpable peripheral pulses  · WOUND: Dressings completely saturated with mostly serous drainage  · EXTREMITY: Left Lower Extremity  · Pulses: brisk capillary refill intact  · Sensation: Sensation intact to light touch to the saphenous/sural/tibial/deep peroneal/superficial peroneal nerves, and grossly throughout the extremity.  · Motor: 5/5 EHL/FHL/TA/GS motor complexes    LABS:   Lab Results   Component Value Date    HGB 10.2 (L) 06/16/2019     Lab Results   Component Value Date    WBC 10.71 06/16/2019     Lab Results   Component Value Date    GLUCOSE 80 06/16/2019    CALCIUM 8.4 (L) 06/16/2019     06/16/2019    K 4.0 06/16/2019    CO2 37.9 (H) 06/16/2019    CL 90 (L) 06/16/2019    BUN 6 (L) 06/16/2019    CREATININE 0.27 (L) 06/16/2019    EGFRIFNONA >150 06/16/2019    BCR 22.2 06/16/2019    ANIONGAP 11.1 06/16/2019       ASSESSMENT: Patient is a 74 y.o. female who is 4 Days Post-Op s/p Procedure(s):  LT. HIP OPEN REDUCTION REVISION     PLAN:   · Weight Bearing: Left Lower Extremity Weight Bearing As Tolerated  · Labs: Above lab  "values review. Plan: No intervention necessary at this time.   · PT/OT: To Mobilize  · DVT PPX: ASA 81 mg  · Post-Op Xray: RIVKA implants in good position. Leg lengths acceptable.    · Hip drainage: Significant drainage continues, need to keep monitoring this.  Patient has a normal white count and continues to be afebrile, no concerns for infection at this time  · Dispo: Acute, pending improvement of wound drainage    R \"Solo\" Desiree KELLEY MD  Orthopaedic Surgery  [facility]  [phone]    "

## 2019-06-16 NOTE — PROGRESS NOTES
Jose Tomlinson MD                          [phone]      Patient ID:    Name:  Maryjo Wynn    MRN:  [id number]    1945   74 y.o.  female            Patient Care Team:  Rashi Ogden MD as PCP - General (Family Medicine)  Linda Conde MD as Consulting Physician (Obstetrics and Gynecology)  Frank Staton MD as Consulting Physician (Pulmonary Disease)  Ryan Diaz MD as Consulting Physician (Interventional Cardiology)    CC/ Reason for visit: Per Assessment mentioned below    Subjective: Pt seen and examined this AM. No acute overnight events noted. Doing better.  Stable oxygen requirements.  Likes the PAP device here.  She states that she cannot get her home machine as no one can pick it up. Rectocele bothering her.     ROS: Denies any subjective fevers, chest pain, nausea/ vomiting.  No new worsening cough or shortness of breath    Objective     Vital Signs past 24hrs    BP range: BP: (111-159)/(65-93) 146/85  Pulse range: Heart Rate:  [75-99] 89  Resp rate range: Resp:  [16-18] 16  Temp range: Temp (24hrs), Av.4 °F (36.9 °C), Min:98.1 °F (36.7 °C), Max:98.7 °F (37.1 °C)      Ventilator/Non-Invasive Ventilation Settings (From admission, onward)    Start     Ordered    19  NIPPV (CPAP or BIPAP)  At Bedtime & As Needed-RT     Question Answer Comment   Indication: Acute Respiratory Failure    Type: AVAPS/PC/PS    NIPPV Mask Interface: Full Face Mask    Backup Rate 16    Target VT (mL) 500    EPAP/PEEP (cm H2O) 5    Min Pressure (cm H2O) 12    Max Pressure (cm H2O) 28    Titrate for SPO2 90%        19          Device (Oxygen Therapy): nasal cannula FiO2 (%): 28 %     50.8 kg (112 lb); Body mass index is 21.87 kg/m².      Intake/Output Summary (Last 24 hours) at 2019 1523  Last data filed at 2019 1347  Gross per 24 hour   Intake 200 ml   Output 450 ml   Net -250 ml       PHYSICAL EXAM   Constitutional:  Middle aged pt in chair, No acute respiratory distress, + accessory muscle use  Head: - NCAT  Eyes: No pallor, Anicteric conjunctiva, EOMI.  ENMT:  Mallampati 4, no oral thrush. No palpable cervical lymphadenopathy, Dry MM.   Heart: RRR, no murmur. No pedal edema   Lungs: ASHLEY +, Distant BS, No wheezes/ crackles heard    Abdomen: Soft. No tenderness, guarding or rigidity. No palpable masses  Extremities: Extremities warm and well perfused. Surgical changes @ hip.  Neuro: Conscious, answers appropriately, no gross focal neuro deficits  Psych: Mood and affect appropriate    Scheduled meds:      arformoterol 15 mcg Nebulization BID - RT   aspirin 81 mg Oral Daily   budesonide 0.5 mg Nebulization BID - RT   busPIRone 10 mg Oral BID   carvedilol 6.25 mg Oral BID   cephalexin 500 mg Oral Q12H   dicyclomine 10 mg Oral TID   ferrous sulfate 325 mg Oral Daily With Breakfast   furosemide 40 mg Oral Daily   guaiFENesin 1,200 mg Oral Q12H   losartan 25 mg Oral Daily   pantoprazole 40 mg Oral Q AM   potassium chloride 20 mEq Oral BID   predniSONE 10 mg Oral Daily   roflumilast 500 mcg Oral Daily   sertraline 50 mg Oral Daily   sucralfate 500 mg Oral 4x Daily PC & at Bedtime   tiotropium bromide monohydrate 2 puff Inhalation Daily - RT       IV meds:                           Data Review:      Results from last 7 days   Lab Units 06/16/19  0416 06/15/19  0440 06/14/19  0325   SODIUM mmol/L 139 139 136   POTASSIUM mmol/L 4.0 4.7 4.4   CHLORIDE mmol/L 90* 92* 92*   CO2 mmol/L 37.9* 39.6* 37.8*   BUN mg/dL 6* 6* 8   CREATININE mg/dL 0.27* 0.25* 0.35*   CALCIUM mg/dL 8.4* 8.6 8.4*   GLUCOSE mg/dL 80 89 97   WBC 10*3/mm3 10.71 11.60* 11.30*   HEMOGLOBIN g/dL 10.2* 9.9* 6.5*   PLATELETS 10*3/mm3 228 205 197       Lab Results   Component Value Date    CALCIUM 8.4 (L) 06/16/2019       Results from last 7 days   Lab Units 06/12/19  1637   WOUNDCX  No growth at 3 days       Results from last 7 days   Lab Units 06/12/19  6405   PH, ARTERIAL  pH units 7.324*   PO2 ART mm Hg 115.5*   PCO2, ARTERIAL mm Hg 87.0*   HCO3 ART mmol/L 45.2*        Results Review:    I have reviewed the relevant laboratory results and reviewed the chest imaging from the last 3 days personally and summarized it below    Assessment    S/p Lt Revision THR 6/12   COPD not in exac   Chronic high-dose steroids  Chronic hypoxemic respiratory failure  Chronic hypercapnic respiratory failure  CAD    PLAN:  Continue with the current nebulizer regimen.  Dec pred to 10 for now and f/u her pulm   Patient on supplemental oxygen at baseline levels.  Doing well on lower Tv @ 400 with AVAPS. On bipap @ home and says that she uses every night. Will let her be on it for now.     Ok to dc from my end    I have discussed my findings and recommendations with patient, family and nursing staff.     Jose Tomlinson MD  6/16/2019

## 2019-06-17 NOTE — NURSING NOTE
Cancelled follow up apt with Dr Ramírez that was scheduled for tomorrow.  Will need to be rescheduled.

## 2019-06-17 NOTE — PLAN OF CARE
Problem: Patient Care Overview  Goal: Plan of Care Review  Outcome: Ongoing (interventions implemented as appropriate)   06/17/19 7711   Coping/Psychosocial   Plan of Care Reviewed With patient   Plan of Care Review   Progress improving   OTHER   Outcome Summary VSS. Having abd cramps today with several small formed BMs. Pulm increased prednisone dose. Plan is discharge with rehab tomorrow. Continue to monitor.      Goal: Individualization and Mutuality  Outcome: Ongoing (interventions implemented as appropriate)    Goal: Discharge Needs Assessment  Outcome: Ongoing (interventions implemented as appropriate)    Goal: Interprofessional Rounds/Family Conf  Outcome: Ongoing (interventions implemented as appropriate)      Problem: Skin Injury Risk (Adult)  Goal: Skin Health and Integrity  Outcome: Ongoing (interventions implemented as appropriate)      Problem: Fall Risk (Adult)  Goal: Absence of Fall  Outcome: Ongoing (interventions implemented as appropriate)      Problem: Pain, Chronic (Adult)  Goal: Acceptable Pain/Comfort Level and Functional Ability  Outcome: Ongoing (interventions implemented as appropriate)      Problem: Chronic Obstructive Pulmonary Disease (Adult)  Goal: Signs and Symptoms of Listed Potential Problems Will be Absent, Minimized or Managed (Chronic Obstructive Pulmonary Disease)  Outcome: Ongoing (interventions implemented as appropriate)

## 2019-06-17 NOTE — THERAPY TREATMENT NOTE
Acute Care - Physical Therapy Treatment Note  [facility]     Patient Name: Maryjo Wynn  : 1945  MRN: [id number]  Today's Date: 2019  Onset of Illness/Injury or Date of Surgery: 19     Referring Physician: Desiree    Admit Date: 2019    Visit Dx:    ICD-10-CM ICD-9-CM   1. Dislocation of left hip, initial encounter (CMS/[facility]) S73.005A 835.00   2. Dislocation of internal left hip prosthesis (CMS/[facility]) T84.021A 996.42     V43.64   3. Difficulty walking R26.2 719.7     Patient Active Problem List   Diagnosis   • Degenerative joint disease (DJD) of hip   • PNA (pneumonia)   • COPD (chronic obstructive pulmonary disease) (CMS/[facility])   • Acute on chronic respiratory failure with hypoxia and hypercapnia (CMS/[facility])   • Chronic diastolic CHF (congestive heart failure) (CMS/[facility])   • CAD (coronary artery disease)   • Asymptomatic carotid artery stenosis   • Atherosclerosis of native artery of both lower extremities with intermittent claudication (CMS/[facility])   • CAD (coronary artery disease), autologous vein bypass graft   • Chronic hypoxemic respiratory failure (CMS/[facility])   • Echocardiogram abnormal   • Fusion of spine of lumbar region   • GI bleed   • History of fusion of lumbar spine   • Hx of CABG   • Postoperative atrial fibrillation (CMS/[facility])   • Paroxysmal atrial fibrillation (CMS/[facility])   • PUD (peptic ulcer disease)   • PVD (peripheral vascular disease) (CMS/[facility])   • S/P femoral-femoral bypass surgery   • Statin intolerance   • TR (tricuspid regurgitation)   • Superior mesenteric artery stenosis (CMS/[facility])   • Closed dislocation of left hip (CMS/[facility])   • Hip dislocation, left (CMS/[facility])       Therapy Treatment    Rehabilitation Treatment Summary     Row Name 19 1620             Treatment Time/Intention    Discipline  physical therapy assistant  -WILLIAMS      Document Type  therapy note (daily note)  -WILLIAMS      Subjective Information  complains of;weakness;fatigue;pain  -JM2      Care Plan Review   patient/other agree to care plan  -2      Existing Precautions/Restrictions  fall;hip  -JM2      Recorded by [] Mary Deras, [facility] 06/17/19 1620  [2] Mary Deras [facility] 06/17/19 1642      Row Name 06/17/19 1620             Vital Signs    Pre SpO2 (%)  98  -JM      O2 Delivery Pre Treatment  supplemental O2 2L  -JM      Post SpO2 (%)  97  -      O2 Delivery Post Treatment  supplemental O2 2L  -JM      Rest Breaks   2  -JM      Recorded by [] Mary Deras [facility] 06/17/19 1649      Row Name 06/17/19 1620             Bed Mobility Assessment/Treatment    Scooting/Bridging Premium (Bed Mobility)  moderate assist (50% patient effort)  -      Supine-Sit Premium (Bed Mobility)  minimum assist (75% patient effort)  -      Bed Mobility, Safety Issues  decreased use of arms for pushing/pulling;decreased use of legs for bridging/pushing  -      Assistive Device (Bed Mobility)  bed rails;draw sheet  -      Recorded by [JM] Mary Deras PTA 06/17/19 1649      Row Name 06/17/19 1620             Sit-Stand Transfer    Sit-Stand Premium (Transfers)  2 person assist;contact guard  -      Assistive Device (Sit-Stand Transfers)  walker, front-wheeled  -      Recorded by [] Mary Deras PTA 06/17/19 1649      Row Name 06/17/19 1620             Stand-Sit Transfer    Stand-Sit Premium (Transfers)  contact guard;verbal cues  -      Assistive Device (Stand-Sit Transfers)  walker, front-wheeled  -      Recorded by [JM] Mary Deras [facility] 06/17/19 1649      Row Name 06/17/19 1620             Gait/Stairs Assessment/Training    Premium Level (Gait)  contact guard;verbal cues;2 person assist  -      Assistive Device (Gait)  walker, front-wheeled  -      Distance in Feet (Gait)  40  -JM      Deviations/Abnormal Patterns (Gait)  base of support, narrow;stride length decreased  -      Bilateral Gait Deviations  forward flexed posture;weight shift ability decreased  -       Recorded by [JM] Mary Deras, [facility] 06/17/19 1649      Row Name 06/17/19 1620             Therapeutic Exercise    Comment (Therapeutic Exercise)  THR protocol x 15 reps; modified hip abd w/assist, did not perf  -      Recorded by [JM] Mary Deras, [facility] 06/17/19 1649      Row Name 06/17/19 1620             Positioning and Restraints    Pre-Treatment Position  in bed  -      Post Treatment Position  chair  -JM      In Chair  reclined;call light within reach;encouraged to call for assist;notified nsg no alarm upon entry  -      Recorded by [JM] Mary Deras, [facility] 06/17/19 1649      Row Name 06/17/19 1620             Pain Scale: Numbers Pre/Post-Treatment    Pain Scale: Numbers, Post-Treatment  6/10  -JM      Pain Location - Side  Right  -JM      Pain Location  hip  -JM      Pain Intervention(s)  Repositioned;Medication (See MAR)  -      Recorded by [] Mary Deras, [facility] 06/17/19 1649      Row Name                Wound 06/12/19 1754 Left hip incision    Wound - Properties Group Date first assessed: 06/12/19 [HH] Time first assessed: 1754 [HH] Side: Left [HH] Location: hip [HH] Type: incision [HH] Recorded by:  [HH] Sofiya Vega RN 06/12/19 1754    Row Name                Wound 06/12/19 1924 Right hand skin tear    Wound - Properties Group Date first assessed: 06/12/19 [KD] Time first assessed: 1924 [KD] Present On Admission : yes [KD] Side: Right [KD] Location: hand [KD] Type: skin tear [KD] Recorded by:  [KD] Jackie Johnson RN 06/12/19 1925    Row Name                Wound 06/12/19 1926 Left lateral arm skin tear    Wound - Properties Group Date first assessed: 06/12/19 [KD] Time first assessed: 1926 [KD] Present On Admission : yes [KD] Side: Left [KD] Orientation: lateral [KD] Location: arm [KD] Type: skin tear [KD] Recorded by:  [Jackie Barrera RN 06/12/19 1926      User Key  (r) = Recorded By, (t) = Taken By, (c) = Cosigned By    Initials Name Effective Dates  Discipline    Jackie Tong, RN 11/02/18 -  Nurse    Sofiya Zamorano RN 06/16/16 -  Nurse    Mary Rodríugez PTA 03/07/18 -  PT          Wound 06/12/19 1754 Left hip incision (Active)   Dressing Appearance dry;intact 6/17/2019  2:22 PM   Closure DRAGAN 6/17/2019  2:22 PM   Base clean;dry 6/17/2019  7:36 AM   Drainage Characteristics/Odor serous 6/17/2019  7:36 AM   Drainage Amount scant 6/17/2019  7:36 AM   Care, Wound other (see comments) 6/16/2019  8:38 PM   Dressing Care, Wound low-adherent 6/17/2019  2:22 PM       Wound 06/12/19 1924 Right hand skin tear (Active)   Dressing Appearance open to air 6/17/2019  2:22 PM   Closure None 6/17/2019  2:22 PM   Base dry;clean;red 6/17/2019  2:22 PM   Drainage Amount none 6/17/2019 12:28 AM   Care, Wound other (see comments) 6/16/2019  8:38 PM   Dressing Care, Wound open to air 6/16/2019  8:38 PM       Wound 06/12/19 1926 Left lateral arm skin tear (Active)   Dressing Appearance open to air 6/17/2019  2:22 PM   Closure Open to air 6/17/2019  2:22 PM   Base clean;dry;red 6/17/2019  2:22 PM   Drainage Amount none 6/17/2019  2:22 PM   Care, Wound other (see comments) 6/16/2019  8:38 PM   Dressing Care, Wound open to air 6/16/2019  8:38 PM           Physical Therapy Education     Title: PT OT SLP Therapies (Done)     Topic: Physical Therapy (Done)     Point: Mobility training (Done)     Learning Progress Summary           Patient Eager, E,TB,D, VU by WILLIAMS at 6/17/2019  4:52 PM    Acceptance, E,TB, VU by HEYDI at 6/16/2019  1:45 PM    Acceptance, E,D, DU,NR by PC at 6/15/2019  3:29 PM    Acceptance, E,TB, VU by CS at 6/13/2019  1:52 PM                   Point: Home exercise program (Done)     Learning Progress Summary           Patient Eager, E,TB,D, VU by WILLIAMS at 6/17/2019  4:52 PM    Acceptance, MICKEY MEDEIROS, VU by CS at 6/16/2019  1:45 PM    Acceptance, WALDEMAR,ANNA, LUIS,NR by PC at 6/15/2019  3:29 PM    Acceptance, MICKEY MEDEIROS VU by CS at 6/13/2019  1:52 PM                   Point: Body  mechanics (Done)     Learning Progress Summary           Patient Eager, E,TB,D, VU by  at 6/17/2019  4:52 PM    Acceptance, E,TB, VU by  at 6/16/2019  1:45 PM    Acceptance, E,D, DU,NR by  at 6/15/2019  3:29 PM    Acceptance, E,TB, VU by  at 6/13/2019  1:52 PM                   Point: Precautions (Done)     Learning Progress Summary           Patient Eager, E,TB,D, VU by  at 6/17/2019  4:52 PM    Acceptance, E,TB, VU by  at 6/16/2019  1:45 PM    Acceptance, E,D, DU,NR by  at 6/15/2019  3:29 PM    Acceptance, E,TB, VU by  at 6/13/2019  1:52 PM                               User Key     Initials Effective Dates Name Provider Type Discipline     04/03/18 -  Jaquelin Sexton, PT Physical Therapist PT     03/07/18 -  Mary Deras, PTA Physical Therapy Assistant PT     05/14/18 -  Doroteo Gomes, PT Physical Therapist PT                PT Recommendation and Plan     Plan of Care Reviewed With: patient  Progress: improving  Outcome Summary: incr amb dist, req 2 standing rests to complete, ther ex L hip protocol modified -no HS, assisted , very minimal range w/hip abd-pt states NO hip abd but no order so stopped after a few  Outcome Measures     Row Name 06/17/19 1600 06/16/19 1300 06/15/19 1500       How much help from another person do you currently need...    Turning from your back to your side while in flat bed without using bedrails?  3  -  3  -CS  3  -PC    Moving from lying on back to sitting on the side of a flat bed without bedrails?  3  -  3  -  3  -PC    Moving to and from a bed to a chair (including a wheelchair)?  3  -  3  -CS  3  -PC    Standing up from a chair using your arms (e.g., wheelchair, bedside chair)?  3  -  3  -CS  3  -PC    Climbing 3-5 steps with a railing?  2  -  3  -CS  3  -PC    To walk in hospital room?  3  -  3  -CS  3  -PC    AM-PAC 6 Clicks Score  17  -  18  -CS  18  -PC       Functional Assessment    Outcome Measure Options  --  AM-PAC 6 Clicks  Basic Mobility (PT)  -CS  --      User Key  (r) = Recorded By, (t) = Taken By, (c) = Cosigned By    Initials Name Provider Type    Jaquelin Acosta, PT Physical Therapist    Mary Rodríguez PTA Physical Therapy Assistant    Doroteo Gu, PT Physical Therapist         Time Calculation:   PT Charges     Row Name 06/17/19 1653             Time Calculation    Start Time  1625  -      Stop Time  1645  -      Time Calculation (min)  20 min  -      PT Received On  06/17/19  -      PT - Next Appointment  06/18/19  -WILLIAMS        User Key  (r) = Recorded By, (t) = Taken By, (c) = Cosigned By    Initials Name Provider Type    Mary Rodríguez PTA Physical Therapy Assistant        Therapy Charges for Today     Code Description Service Date Service Provider Modifiers Qty    49258891356 HC PT THER PROC EA 15 MIN 6/17/2019 Mary Deras PTA GP 2    11137085561 HC PT THER SUPP EA 15 MIN 6/17/2019 Mary Deras PTA GP 1          PT G-Codes  Outcome Measure Options: AM-PAC 6 Clicks Basic Mobility (PT)  AM-PAC 6 Clicks Score: 17    Mary Dersa PTA  6/17/2019

## 2019-06-17 NOTE — PLAN OF CARE
Problem: Patient Care Overview  Goal: Plan of Care Review  Outcome: Ongoing (interventions implemented as appropriate)   06/17/19 3463   Coping/Psychosocial   Plan of Care Reviewed With patient   Plan of Care Review   Progress improving   OTHER   Outcome Summary incr amb dist, req 2 standing rests to complete, ther ex L hip protocol modified -no HS, assisted , very minimal range w/hip abd-pt states NO hip abd but no order so stopped after a few

## 2019-06-17 NOTE — PROGRESS NOTES
Jose Tomlinson MD                          [phone]      Patient ID:    Name:  Maryjo Wynn    MRN:  [id number]    1945   74 y.o.  female            Patient Care Team:  Rashi Ogden MD as PCP - General (Family Medicine)  Linda Conde MD as Consulting Physician (Obstetrics and Gynecology)  Frank Staton MD as Consulting Physician (Pulmonary Disease)  Ryan Diaz MD as Consulting Physician (Interventional Cardiology)    CC/ Reason for visit: Per Assessment mentioned below    Subjective: Pt seen and examined this AM. No acute overnight events noted.  Has some wheezing and increasing shortness of breath today.  Likes the PAP device here.     ROS: Denies any subjective fevers, chest pain, nausea/ vomiting.  No new worsening cough or shortness of breath    Objective     Vital Signs past 24hrs    BP range: BP: (126-173)/(82-98) 126/82  Pulse range: Heart Rate:  [] 89  Resp rate range: Resp:  [16-18] 18  Temp range: Temp (24hrs), Av.9 °F (36.6 °C), Min:97.6 °F (36.4 °C), Max:98.2 °F (36.8 °C)      Ventilator/Non-Invasive Ventilation Settings (From admission, onward)    Start     Ordered    19  NIPPV (CPAP or BIPAP)  At Bedtime & As Needed-RT     Question Answer Comment   Indication: Acute Respiratory Failure    Type: AVAPS/PC/PS    NIPPV Mask Interface: Full Face Mask    Backup Rate 16    Target VT (mL) 500    EPAP/PEEP (cm H2O) 5    Min Pressure (cm H2O) 12    Max Pressure (cm H2O) 28    Titrate for SPO2 90%        19          Device (Oxygen Therapy): nasal cannula FiO2 (%): 28 %     50.8 kg (112 lb); Body mass index is 21.87 kg/m².      Intake/Output Summary (Last 24 hours) at 2019 1621  Last data filed at 2019 1138  Gross per 24 hour   Intake 500 ml   Output 650 ml   Net -150 ml       PHYSICAL EXAM   Constitutional: Middle aged pt in chair, No acute respiratory distress, + accessory muscle  use  Head: - NCAT  Eyes: No pallor, Anicteric conjunctiva, EOMI.  ENMT:  Mallampati 4, no oral thrush. No palpable cervical lymphadenopathy, Dry MM.   Heart: RRR, no murmur. No pedal edema   Lungs: ASHLEY +, Distant BS, ++ wheezes, No crackles heard    Abdomen: Soft. No tenderness, guarding or rigidity. No palpable masses  Extremities: Extremities warm and well perfused. Surgical changes @ hip.  Neuro: Conscious, answers appropriately, no gross focal neuro deficits  Psych: Mood and affect appropriate    Scheduled meds:      arformoterol 15 mcg Nebulization BID - RT   aspirin 81 mg Oral Daily   budesonide 0.5 mg Nebulization BID - RT   busPIRone 10 mg Oral BID   carvedilol 6.25 mg Oral BID   cephalexin 500 mg Oral Q12H   dicyclomine 10 mg Oral TID   ferrous sulfate 325 mg Oral Daily With Breakfast   furosemide 40 mg Oral Daily   guaiFENesin 1,200 mg Oral Q12H   losartan 25 mg Oral Daily   pantoprazole 40 mg Oral Q AM   potassium chloride 20 mEq Oral BID   predniSONE 10 mg Oral Daily   roflumilast 500 mcg Oral Daily   sertraline 50 mg Oral Daily   sucralfate 500 mg Oral 4x Daily PC & at Bedtime   tiotropium bromide monohydrate 2 puff Inhalation Daily - RT       IV meds:                           Data Review:      Results from last 7 days   Lab Units 06/17/19  0507 06/16/19  0416 06/15/19  0440   SODIUM mmol/L 135* 139 139   POTASSIUM mmol/L 3.7 4.0 4.7   CHLORIDE mmol/L 89* 90* 92*   CO2 mmol/L 37.2* 37.9* 39.6*   BUN mg/dL 5* 6* 6*   CREATININE mg/dL 0.22* 0.27* 0.25*   CALCIUM mg/dL 8.6 8.4* 8.6   GLUCOSE mg/dL 89 80 89   WBC 10*3/mm3 9.78 10.71 11.60*   HEMOGLOBIN g/dL 10.1* 10.2* 9.9*   PLATELETS 10*3/mm3 231 228 205       Lab Results   Component Value Date    CALCIUM 8.6 06/17/2019       Results from last 7 days   Lab Units 06/12/19  1637   WOUNDCX  No growth at 3 days       Results from last 7 days   Lab Units 06/12/19  2251   PH, ARTERIAL pH units 7.324*   PO2 ART mm Hg 115.5*   PCO2, ARTERIAL mm Hg 87.0*   HCO3  ART mmol/L 45.2*        Results Review:    I have reviewed the relevant laboratory results and reviewed the chest imaging from the last 3 days personally and summarized it below    Assessment    S/p Lt Revision THR 6/12   AECOPD  Chronic high-dose steroids  Chronic hypoxemic respiratory failure  Chronic hypercapnic respiratory failure  CAD    PLAN:  Some wheezing today and difficulty breathing.   Continue with the current nebulizer regimen.  Will inc pred to 20mg and f/u her pulm   Patient on supplemental oxygen at baseline levels.  Doing well on lower Tv @ 400 with AVAPS. On bipap @ home and says that she uses every night. Will let her be on it for now z@ dc.    Ok to dc from my end    I have discussed my findings and recommendations with patient, family and nursing staff.     Jose Tomlinson MD  6/17/2019

## 2019-06-17 NOTE — PROGRESS NOTES
"  Orthopaedic Surgery   Daily Progress Note  Dr. Josh Ramírez Sr  [phone]  DEMOGRAPHICS:   · Maryjo Wynn   · Age:74 y.o.   · MRN:[id number]  · Admitted: 6/12/2019    PROCEDURE: 5 Days Post-Op s/p Procedure(s):  LT. HIP OPEN REDUCTION REVISION     /83 (BP Location: Left arm, Patient Position: Lying)   Pulse 66   Temp 98.2 °F (36.8 °C) (Oral)   Resp 16   Ht 152.4 cm (60\")   Wt 50.8 kg (112 lb)   SpO2 97%   BMI 21.87 kg/m²     Lab Results (last 24 hours)     Procedure Component Value Units Date/Time    Basic Metabolic Panel [[id number]]  (Abnormal) Collected:  06/17/19 0507    Specimen:  Blood Updated:  06/17/19 0634     Glucose 89 mg/dL      BUN 5 mg/dL      Creatinine 0.22 mg/dL      Sodium 135 mmol/L      Potassium 3.7 mmol/L      Chloride 89 mmol/L      CO2 37.2 mmol/L      Calcium 8.6 mg/dL      eGFR Non African Amer >150 mL/min/1.73      BUN/Creatinine Ratio 22.7     Anion Gap 8.8 mmol/L     Narrative:       GFR Normal >60  Chronic Kidney Disease <60  Kidney Failure <15    CBC & Differential [[id number]] Collected:  06/17/19 0507    Specimen:  Blood Updated:  06/17/19 0623    Narrative:       The following orders were created for panel order CBC & Differential.  Procedure                               Abnormality         Status                     ---------                               -----------         ------                     CBC Auto Differential[[id number]]        Abnormal            Final result                 Please view results for these tests on the individual orders.    CBC Auto Differential [[id number]]  (Abnormal) Collected:  06/17/19 0507    Specimen:  Blood Updated:  06/17/19 0623     WBC 9.78 10*3/mm3      RBC 3.32 10*6/mm3      Hemoglobin 10.1 g/dL      Hematocrit 33.8 %      .8 fL      MCH 30.4 pg      MCHC 29.9 g/dL      RDW 16.0 %      RDW-SD 60.1 fl      MPV 11.3 fL      Platelets 231 10*3/mm3      Neutrophil % 73.4 %      Lymphocyte % 15.8 %      Monocyte % 9.3 " %      Eosinophil % 0.7 %      Basophil % 0.2 %      Immature Grans % 0.6 %      Neutrophils, Absolute 7.17 10*3/mm3      Lymphocytes, Absolute 1.55 10*3/mm3      Monocytes, Absolute 0.91 10*3/mm3      Eosinophils, Absolute 0.07 10*3/mm3      Basophils, Absolute 0.02 10*3/mm3      Immature Grans, Absolute 0.06 10*3/mm3      nRBC 0.0 /100 WBC     POC Glucose Once [[id number]]  (Normal) Collected:  06/17/19 0534    Specimen:  Blood Updated:  06/17/19 0536     Glucose 84 mg/dL     POC Glucose Once [[id number]]  (Normal) Collected:  06/16/19 2014    Specimen:  Blood Updated:  06/16/19 2016     Glucose 115 mg/dL     POC Glucose Once [[id number]]  (Normal) Collected:  06/16/19 1605    Specimen:  Blood Updated:  06/16/19 1607     Glucose 116 mg/dL     POC Glucose Once [[id number]]  (Normal) Collected:  06/16/19 1142    Specimen:  Blood Updated:  06/16/19 1145     Glucose 119 mg/dL           Imaging Results (last 24 hours)     ** No results found for the last 24 hours. **          Patient Care Team:  Rashi Ogden MD as PCP - General (Family Medicine)  Linda Conde MD as Consulting Physician (Obstetrics and Gynecology)  Frank Staton MD as Consulting Physician (Pulmonary Disease)  Ryan Diaz MD as Consulting Physician (Interventional Cardiology)    SUBJECTIVE  More comfortable    PHYSICAL EXAM  Wound looks good.  Serous drainage present but diminishing.  No sign of infection     ASSESSMENT: Patient is a 74 y.o. female who is 5 Days Post-Op s/p Procedure(s):  LT. HIP OPEN REDUCTION REVISION     PLAN / DISPOSITION:  Continue dressing changes.  Watch the wound another 24 hours.  Perhaps discharge tomorrow if drainage continues to diminish.    Josh Ramírez Sr, MD  Orthopaedic Surgery  [facility]  [phone]

## 2019-06-17 NOTE — PLAN OF CARE
Problem: Patient Care Overview  Goal: Plan of Care Review  Outcome: Ongoing (interventions implemented as appropriate)   06/17/19 0400   Coping/Psychosocial   Plan of Care Reviewed With patient   Plan of Care Review   Progress improving   OTHER   Outcome Summary pain controlled with PO meds, on BIPAP for most of night, small amount of drainage on dressing, VSS, will continue to monitor       Problem: Skin Injury Risk (Adult)  Goal: Skin Health and Integrity  Outcome: Ongoing (interventions implemented as appropriate)      Problem: Fall Risk (Adult)  Goal: Absence of Fall  Outcome: Ongoing (interventions implemented as appropriate)      Problem: Pain, Chronic (Adult)  Goal: Acceptable Pain/Comfort Level and Functional Ability  Outcome: Ongoing (interventions implemented as appropriate)      Problem: Chronic Obstructive Pulmonary Disease (Adult)  Goal: Signs and Symptoms of Listed Potential Problems Will be Absent, Minimized or Managed (Chronic Obstructive Pulmonary Disease)  Outcome: Ongoing (interventions implemented as appropriate)

## 2019-06-18 NOTE — THERAPY TREATMENT NOTE
Acute Care - Physical Therapy Treatment Note  [facility]     Patient Name: Maryjo Wynn  : 1945  MRN: [id number]  Today's Date: 2019  Onset of Illness/Injury or Date of Surgery: 19     Referring Physician: Desiree    Admit Date: 2019    Visit Dx:    ICD-10-CM ICD-9-CM   1. Dislocation of left hip, initial encounter (CMS/[facility]) S73.005A 835.00   2. Dislocation of internal left hip prosthesis (CMS/[facility]) T84.021A 996.42     V43.64   3. Difficulty walking R26.2 719.7     Patient Active Problem List   Diagnosis   • Degenerative joint disease (DJD) of hip   • PNA (pneumonia)   • COPD (chronic obstructive pulmonary disease) (CMS/[facility])   • Acute on chronic respiratory failure with hypoxia and hypercapnia (CMS/[facility])   • Chronic diastolic CHF (congestive heart failure) (CMS/[facility])   • CAD (coronary artery disease)   • Asymptomatic carotid artery stenosis   • Atherosclerosis of native artery of both lower extremities with intermittent claudication (CMS/[facility])   • CAD (coronary artery disease), autologous vein bypass graft   • Chronic hypoxemic respiratory failure (CMS/[facility])   • Echocardiogram abnormal   • Fusion of spine of lumbar region   • GI bleed   • History of fusion of lumbar spine   • Hx of CABG   • Postoperative atrial fibrillation (CMS/[facility])   • Paroxysmal atrial fibrillation (CMS/[facility])   • PUD (peptic ulcer disease)   • PVD (peripheral vascular disease) (CMS/[facility])   • S/P femoral-femoral bypass surgery   • Statin intolerance   • TR (tricuspid regurgitation)   • Superior mesenteric artery stenosis (CMS/[facility])   • Closed dislocation of left hip (CMS/[facility])   • Hip dislocation, left (CMS/[facility])       Therapy Treatment    Rehabilitation Treatment Summary     Row Name 19 1540             Treatment Time/Intention    Discipline  physical therapy assistant  -WILLIAMS      Document Type  therapy note (daily note)  -WILLIAMS      Subjective Information  complains of;fatigue  -WILLIAMS      Care Plan Review  patient/other agree to  care plan  -      Comment  still req cues to avoid IR L  -JM      Existing Precautions/Restrictions  fall;hip  -JM      Recorded by [] Mary Deras, [facility] 06/18/19 1543      Row Name 06/18/19 1540             Bed Mobility Assessment/Treatment    Sit-Supine Velva (Bed Mobility)  minimum assist (75% patient effort) for LEs  -JM      Bed Mobility, Safety Issues  decreased use of arms for pushing/pulling;decreased use of legs for bridging/pushing  -JM      Recorded by [JM] Mary Deras, [facility] 06/18/19 1543      Row Name 06/18/19 1540             Sit-Stand Transfer    Sit-Stand Velva (Transfers)  contact guard  -      Assistive Device (Sit-Stand Transfers)  walker, front-wheeled  -      Recorded by [] Mary Deras [facility] 06/18/19 1543      Row Name 06/18/19 1540             Stand-Sit Transfer    Stand-Sit Velva (Transfers)  contact guard;verbal cues  -      Assistive Device (Stand-Sit Transfers)  walker, front-wheeled  -      Recorded by [JM] Mary Deras PTA 06/18/19 1543      Row Name 06/18/19 1540             Gait/Stairs Assessment/Training    Velva Level (Gait)  contact guard;verbal cues  -      Assistive Device (Gait)  walker, front-wheeled  -      Distance in Feet (Gait)  40  -      Deviations/Abnormal Patterns (Gait)  base of support, narrow;stride length decreased  -      Bilateral Gait Deviations  forward flexed posture;weight shift ability decreased  -      Recorded by [] Mary Deras [facility] 06/18/19 1543      Row Name 06/18/19 1540             Therapeutic Exercise    Comment (Therapeutic Exercise)  THR protocol modified x 15 reps(no HS, NO hip abd)-no order but pt states she was told by MD COLEMAN      Recorded by [] Mary Deras [facility] 06/18/19 1543      Row Name 06/18/19 1540             Positioning and Restraints    Pre-Treatment Position  sitting in chair/recliner  -      In Bed  fowlers;call light within reach;encouraged to call for  assist;exit alarm on  -JM      Recorded by [WILLIAMS] Mary Deras PTA 06/18/19 1543      Row Name 06/18/19 1540             Pain Scale: Numbers Pre/Post-Treatment    Pain Scale: Numbers, Pretreatment  0/10 - no pain  -      Pain Scale: Numbers, Post-Treatment  0/10 - no pain  -      Pain Location - Side  Right  -      Pain Location  hip  -JM      Recorded by [WILLIAMS] Mary Deras PTA 06/18/19 1543      Row Name                Wound 06/12/19 1754 Left hip incision    Wound - Properties Group Date first assessed: 06/12/19 [HH] Time first assessed: 1754 [HH] Side: Left [HH] Location: hip [HH] Type: incision [HH] Recorded by:  [CASPER] Sofiya Vega RN 06/12/19 1754    Row Name                Wound 06/12/19 1924 Right hand skin tear    Wound - Properties Group Date first assessed: 06/12/19 [KD] Time first assessed: 1924 [KD] Present On Admission : yes [KD] Side: Right [KD] Location: hand [KD] Type: skin tear [KD] Recorded by:  [CAITLYN] Jackie Johnson RN 06/12/19 1925    Row Name                Wound 06/12/19 1926 Left lateral arm skin tear    Wound - Properties Group Date first assessed: 06/12/19 [KD] Time first assessed: 1926 [KD] Present On Admission : yes [KD] Side: Left [KD] Orientation: lateral [KD] Location: arm [KD] Type: skin tear [KD] Recorded by:  [Jackie Barrera RN 06/12/19 1926      User Key  (r) = Recorded By, (t) = Taken By, (c) = Cosigned By    Initials Name Effective Dates Discipline    KD Jackie Johnson RN 11/02/18 -  Nurse    Sofiya Zamorano RN 06/16/16 -  Nurse    Mary Rodríguez PTA 03/07/18 -  PT          Wound 06/12/19 1754 Left hip incision (Active)   Dressing Appearance dry;intact 6/18/2019  2:20 PM   Closure Staples 6/18/2019  2:20 PM   Base clean;dry 6/18/2019  2:20 PM   Drainage Characteristics/Odor serous 6/18/2019  1:06 AM   Drainage Amount scant 6/18/2019  1:06 AM   Care, Wound other (see comments) 6/17/2019  8:39 PM   Dressing Care, Wound dressing  changed;dressing applied;low-adherent;border dressing 6/18/2019  1:06 AM   Periwound Care, Wound dry periwound area maintained 6/18/2019  1:06 AM       Wound 06/12/19 1924 Right hand skin tear (Active)   Dressing Appearance open to air 6/18/2019  2:20 PM   Drainage Amount none 6/18/2019  1:06 AM   Dressing Care, Wound open to air 6/18/2019  2:20 PM       Wound 06/12/19 1926 Left lateral arm skin tear (Active)   Dressing Appearance open to air 6/18/2019  2:20 PM   Closure Open to air 6/18/2019  1:06 AM   Drainage Amount none 6/18/2019  1:06 AM   Dressing Care, Wound open to air 6/18/2019  2:20 PM           Physical Therapy Education     Title: PT OT SLP Therapies (Done)     Topic: Physical Therapy (Done)     Point: Mobility training (Done)     Learning Progress Summary           Patient Acceptance, E,TB,D, VU by WILLIAMS at 6/18/2019  3:44 PM    Eager, E,TB,D, VU by WILLIAMS at 6/17/2019  4:52 PM    Acceptance, E,TB, VU by CS at 6/16/2019  1:45 PM    Acceptance, E,D, DU,NR by PC at 6/15/2019  3:29 PM    Acceptance, E,TB, VU by CS at 6/13/2019  1:52 PM                   Point: Home exercise program (Done)     Learning Progress Summary           Patient Acceptance, E,TB,D, VU by WILLIAMS at 6/18/2019  3:44 PM    Eager, E,TB,D, VU by WILLIAMS at 6/17/2019  4:52 PM    Acceptance, E,TB, VU by HEYDI at 6/16/2019  1:45 PM    Acceptance, E,D, DU,NR by PC at 6/15/2019  3:29 PM    Acceptance, E,TB, VU by CS at 6/13/2019  1:52 PM                   Point: Body mechanics (Done)     Learning Progress Summary           Patient Acceptance, E,TB,D, VU by WILLIAMS at 6/18/2019  3:44 PM    Eager, E,TB,D, VU by WILLIAMS at 6/17/2019  4:52 PM    Acceptance, E,TB, VU by HEYDI at 6/16/2019  1:45 PM    Acceptance, E,D, DU,NR by PC at 6/15/2019  3:29 PM    AcceptanceWALDEMAR TB, VU by HEYDI at 6/13/2019  1:52 PM                   Point: Precautions (Done)     Learning Progress Summary           Patient Acceptance, MICKEY MEDEIROS D, VU by WILLIAMS at 6/18/2019  3:44 PM    JuwanerWALDEMAR TB, D, VU by WILLIAMS at  6/17/2019  4:52 PM    Acceptance, E,TB, VU by CS at 6/16/2019  1:45 PM    Acceptance, E,D, DU,NR by  at 6/15/2019  3:29 PM    Acceptance, E,TB, VU by CS at 6/13/2019  1:52 PM                               User Key     Initials Effective Dates Name Provider Type Discipline     04/03/18 -  Jaquelin Sexton, PT Physical Therapist PT     03/07/18 -  Mary Deras PTA Physical Therapy Assistant PT     05/14/18 -  Doroteo Gomes, PT Physical Therapist PT                PT Recommendation and Plan     Plan of Care Reviewed With: patient  Progress: improving  Outcome Summary: fatigue limiting incr in amb dist, very agreeable to PT even though BR issues making her feel weaker today; plans SNU at [place]  Outcome Measures     Row Name 06/18/19 1500 06/17/19 1600 06/16/19 1300       How much help from another person do you currently need...    Turning from your back to your side while in flat bed without using bedrails?  3  -JM  3  -JM  3  -CS    Moving from lying on back to sitting on the side of a flat bed without bedrails?  3  -JM  3  -JM  3  -CS    Moving to and from a bed to a chair (including a wheelchair)?  3  -JM  3  -JM  3  -CS    Standing up from a chair using your arms (e.g., wheelchair, bedside chair)?  3  -JM  3  -JM  3  -CS    Climbing 3-5 steps with a railing?  2  -JM  2  -JM  3  -CS    To walk in hospital room?  3  -JM  3  -JM  3  -CS    AM-PAC 6 Clicks Score  17  -JM  17  -JM  18  -CS       Functional Assessment    Outcome Measure Options  --  --  AM-PAC 6 Clicks Basic Mobility (PT)  -CS      User Key  (r) = Recorded By, (t) = Taken By, (c) = Cosigned By    Initials Name Provider Type    Mary Rodríguez PTA Physical Therapy Assistant    Doroteo Gu, PT Physical Therapist         Time Calculation:   PT Charges     Row Name 06/18/19 1545             Time Calculation    Start Time  1620  -      Stop Time  1637  -      Time Calculation (min)  17 min  -JM      PT Received On  06/18/19  -WILLIAMS       PT - Next Appointment  06/19/19  -WILLIAMS        User Key  (r) = Recorded By, (t) = Taken By, (c) = Cosigned By    Initials Name Provider Type    Mary Rodríguez PTA Physical Therapy Assistant        Therapy Charges for Today     Code Description Service Date Service Provider Modifiers Qty    90211522344 HC PT THER PROC EA 15 MIN 6/17/2019 Mary Deras PTA GP 2    92241233870 HC PT THER SUPP EA 15 MIN 6/17/2019 Mary Deras PTA GP 1    67923478905 HC PT THER PROC EA 15 MIN 6/18/2019 Mary Deras PTA GP 1          PT G-Codes  Outcome Measure Options: AM-PAC 6 Clicks Basic Mobility (PT)  AM-PAC 6 Clicks Score: 17    Mary Deras PTA  6/18/2019

## 2019-06-18 NOTE — PLAN OF CARE
Problem: Patient Care Overview  Goal: Plan of Care Review  Outcome: Ongoing (interventions implemented as appropriate)   06/18/19 0413   Coping/Psychosocial   Plan of Care Reviewed With patient   Plan of Care Review   Progress improving   OTHER   Outcome Summary pain controlled with PO meds, on BIPAP while sleeping, possible discharge in AM, VSS, will continue to monitor       Problem: Skin Injury Risk (Adult)  Goal: Skin Health and Integrity  Outcome: Ongoing (interventions implemented as appropriate)      Problem: Fall Risk (Adult)  Goal: Absence of Fall  Outcome: Ongoing (interventions implemented as appropriate)      Problem: Pain, Chronic (Adult)  Goal: Acceptable Pain/Comfort Level and Functional Ability  Outcome: Ongoing (interventions implemented as appropriate)      Problem: Chronic Obstructive Pulmonary Disease (Adult)  Goal: Signs and Symptoms of Listed Potential Problems Will be Absent, Minimized or Managed (Chronic Obstructive Pulmonary Disease)  Outcome: Ongoing (interventions implemented as appropriate)

## 2019-06-18 NOTE — PROGRESS NOTES
"  Orthopaedic Surgery   Daily Progress Note  Dr. Josh Ramírez Sr  [phone]  DEMOGRAPHICS:   · Maryjo Wynn   · Age:74 y.o.   · MRN:[id number]  · Admitted: 6/12/2019    PROCEDURE: 6 Days Post-Op s/p Procedure(s):  LT. HIP OPEN REDUCTION REVISION     /86 (BP Location: Left arm, Patient Position: Lying)   Pulse 93   Temp 98 °F (36.7 °C) (Oral)   Resp 18   Ht 152.4 cm (60\")   Wt 50 kg (110 lb 3.7 oz)   SpO2 96%   BMI 21.53 kg/m²     Lab Results (last 24 hours)     Procedure Component Value Units Date/Time    Basic Metabolic Panel [[id number]]  (Abnormal) Collected:  06/18/19 0507    Specimen:  Blood Updated:  06/18/19 0640     Glucose 88 mg/dL      BUN 5 mg/dL      Creatinine 0.28 mg/dL      Sodium 137 mmol/L      Potassium 3.9 mmol/L      Chloride 91 mmol/L      CO2 35.7 mmol/L      Calcium 9.1 mg/dL      eGFR Non African Amer >150 mL/min/1.73      BUN/Creatinine Ratio 17.9     Anion Gap 10.3 mmol/L     Narrative:       GFR Normal >60  Chronic Kidney Disease <60  Kidney Failure <15    POC Glucose Once [[id number]]  (Normal) Collected:  06/18/19 0623    Specimen:  Blood Updated:  06/18/19 0628     Glucose 97 mg/dL     CBC & Differential [[id number]] Collected:  06/18/19 0507    Specimen:  Blood Updated:  06/18/19 0608    Narrative:       The following orders were created for panel order CBC & Differential.  Procedure                               Abnormality         Status                     ---------                               -----------         ------                     CBC Auto Differential[[id number]]        Abnormal            Final result                 Please view results for these tests on the individual orders.    CBC Auto Differential [[id number]]  (Abnormal) Collected:  06/18/19 0507    Specimen:  Blood Updated:  06/18/19 0608     WBC 10.27 10*3/mm3      RBC 3.62 10*6/mm3      Hemoglobin 11.0 g/dL      Hematocrit 36.7 %      .4 fL      MCH 30.4 pg      MCHC 30.0 g/dL      RDW " 15.5 %      RDW-SD 58.3 fl      MPV 11.1 fL      Platelets 252 10*3/mm3      Neutrophil % 71.2 %      Lymphocyte % 18.5 %      Monocyte % 8.9 %      Eosinophil % 0.4 %      Basophil % 0.2 %      Immature Grans % 0.8 %      Neutrophils, Absolute 7.32 10*3/mm3      Lymphocytes, Absolute 1.90 10*3/mm3      Monocytes, Absolute 0.91 10*3/mm3      Eosinophils, Absolute 0.04 10*3/mm3      Basophils, Absolute 0.02 10*3/mm3      Immature Grans, Absolute 0.08 10*3/mm3      nRBC 0.1 /100 WBC     POC Glucose Once [[id number]]  (Normal) Collected:  06/17/19 2007    Specimen:  Blood Updated:  06/17/19 2009     Glucose 108 mg/dL     POC Glucose Once [[id number]]  (Abnormal) Collected:  06/17/19 1558    Specimen:  Blood Updated:  06/17/19 1600     Glucose 143 mg/dL     POC Glucose Once [[id number]]  (Normal) Collected:  06/17/19 1105    Specimen:  Blood Updated:  06/17/19 1108     Glucose 97 mg/dL     Anaerobic Culture - Wound, Hip, Left [[id number]] Collected:  06/12/19 1637    Specimen:  Wound from Hip, Left Updated:  06/17/19 1011     Anaerobic Culture No anaerobes isolated at 5 days          Imaging Results (last 24 hours)     ** No results found for the last 24 hours. **          Patient Care Team:  Rashi Ogden MD as PCP - General (Family Medicine)  Linda Conde MD as Consulting Physician (Obstetrics and Gynecology)  Frank Staton MD as Consulting Physician (Pulmonary Disease)  Ryan Diaz MD as Consulting Physician (Interventional Cardiology)    SUBJECTIVE  With regard to her hip, she feels better each day  Complaining of some diarrhea starting last night    PHYSICAL EXAM  Her hip incision looks better.  There is no discoloration, erythema, or any sign of cellulitis.  The drainage is much more scant now.  It remains clear and serous.     ASSESSMENT: Patient is a 74 y.o. female who is 6 Days Post-Op s/p Procedure(s):  LT. HIP OPEN REDUCTION REVISION     PLAN / DISPOSITION:  Stop the Keflex.   Empirically start Flagyl.  Watch the wound 24 hours and her GI status.  Discharge tomorrow if her condition continues to improve.    Josh Ramírez Sr, MD  Orthopaedic Surgery  [facility]  [phone]

## 2019-06-18 NOTE — PLAN OF CARE
Problem: Patient Care Overview  Goal: Plan of Care Review  Outcome: Ongoing (interventions implemented as appropriate)   06/18/19 1623   Coping/Psychosocial   Plan of Care Reviewed With patient   Plan of Care Review   Progress improving   OTHER   Outcome Summary c/o diarrhea, immodium ordered and abx switched,plan to dc tomorrow, will check c diff, PRN meds given, vss, will continue to monitor      Goal: Individualization and Mutuality  Outcome: Ongoing (interventions implemented as appropriate)    Goal: Discharge Needs Assessment  Outcome: Ongoing (interventions implemented as appropriate)    Goal: Interprofessional Rounds/Family Conf  Outcome: Ongoing (interventions implemented as appropriate)      Problem: Skin Injury Risk (Adult)  Goal: Skin Health and Integrity  Outcome: Ongoing (interventions implemented as appropriate)      Problem: Fall Risk (Adult)  Goal: Absence of Fall  Outcome: Ongoing (interventions implemented as appropriate)      Problem: Pain, Chronic (Adult)  Goal: Acceptable Pain/Comfort Level and Functional Ability  Outcome: Ongoing (interventions implemented as appropriate)      Problem: Chronic Obstructive Pulmonary Disease (Adult)  Goal: Signs and Symptoms of Listed Potential Problems Will be Absent, Minimized or Managed (Chronic Obstructive Pulmonary Disease)  Outcome: Ongoing (interventions implemented as appropriate)

## 2019-06-18 NOTE — PROGRESS NOTES
Jose Tomlinson MD                          [phone]      Patient ID:    Name:  Maryjo Wynn    MRN:  [id number]    1945   74 y.o.  female            Patient Care Team:  Rashi Ogden MD as PCP - General (Family Medicine)  Linda Conde MD as Consulting Physician (Obstetrics and Gynecology)  Frank Staton MD as Consulting Physician (Pulmonary Disease)  Ryan Diaz MD as Consulting Physician (Interventional Cardiology)    CC/ Reason for visit: Per Assessment mentioned below    Subjective: Pt seen and examined this AM. No acute overnight events noted.  Improved wheezing today.      ROS: Denies any subjective fevers, chest pain, nausea/ vomiting.  No new worsening cough or shortness of breath    Objective     Vital Signs past 24hrs    BP range: BP: (136-190)/(76-96) 136/76  Pulse range: Heart Rate:  [] 99  Resp rate range: Resp:  [18-22] 18  Temp range: Temp (24hrs), Av.1 °F (36.7 °C), Min:98 °F (36.7 °C), Max:98.3 °F (36.8 °C)      Ventilator/Non-Invasive Ventilation Settings (From admission, onward)    Start     Ordered    19  NIPPV (CPAP or BIPAP)  At Bedtime & As Needed-RT     Question Answer Comment   Indication: Acute Respiratory Failure    Type: AVAPS/PC/PS    NIPPV Mask Interface: Full Face Mask    Backup Rate 16    Target VT (mL) 500    EPAP/PEEP (cm H2O) 5    Min Pressure (cm H2O) 12    Max Pressure (cm H2O) 28    Titrate for SPO2 90%        19          Device (Oxygen Therapy): nasal cannula FiO2 (%): 28 %     50 kg (110 lb 3.7 oz); Body mass index is 21.53 kg/m².      Intake/Output Summary (Last 24 hours) at 2019 1513  Last data filed at 2019 1238  Gross per 24 hour   Intake 60 ml   Output 300 ml   Net -240 ml       PHYSICAL EXAM   Constitutional: Middle aged pt in chair, No acute respiratory distress, + accessory muscle use  Head: - NCAT  Eyes: No pallor, Anicteric conjunctiva,  EOMI.  ENMT:  Mallampati 4, no oral thrush. No palpable cervical lymphadenopathy, Dry MM.   Heart: RRR, no murmur. No pedal edema   Lungs: ASHLEY +, Distant BS, ++ wheezes, No crackles heard    Abdomen: Soft. No tenderness, guarding or rigidity. No palpable masses  Extremities: Extremities warm and well perfused. Surgical changes @ hip.  Neuro: Conscious, answers appropriately, no gross focal neuro deficits  Psych: Mood and affect appropriate    Scheduled meds:      albuterol 2.5 mg Nebulization 4x Daily - RT   arformoterol 15 mcg Nebulization BID - RT   aspirin 81 mg Oral Daily   budesonide 0.5 mg Nebulization BID - RT   busPIRone 10 mg Oral BID   carvedilol 6.25 mg Oral BID   dicyclomine 10 mg Oral TID   ferrous sulfate 325 mg Oral Daily With Breakfast   furosemide 40 mg Oral Daily   guaiFENesin 1,200 mg Oral Q12H   losartan 25 mg Oral Daily   metroNIDAZOLE 500 mg Oral Q12H   pantoprazole 40 mg Oral Q AM   potassium chloride 20 mEq Oral BID   predniSONE 20 mg Oral Daily With Breakfast   roflumilast 500 mcg Oral Daily   sertraline 50 mg Oral Daily   sucralfate 500 mg Oral 4x Daily PC & at Bedtime   tiotropium bromide monohydrate 2 puff Inhalation Daily - RT       IV meds:                           Data Review:      Results from last 7 days   Lab Units 06/18/19  0507 06/17/19  0507 06/16/19  0416   SODIUM mmol/L 137 135* 139   POTASSIUM mmol/L 3.9 3.7 4.0   CHLORIDE mmol/L 91* 89* 90*   CO2 mmol/L 35.7* 37.2* 37.9*   BUN mg/dL 5* 5* 6*   CREATININE mg/dL 0.28* 0.22* 0.27*   CALCIUM mg/dL 9.1 8.6 8.4*   GLUCOSE mg/dL 88 89 80   WBC 10*3/mm3 10.27 9.78 10.71   HEMOGLOBIN g/dL 11.0* 10.1* 10.2*   PLATELETS 10*3/mm3 252 231 228       Lab Results   Component Value Date    CALCIUM 9.1 06/18/2019       Results from last 7 days   Lab Units 06/12/19  1637   WOUNDCX  No growth at 3 days       Results from last 7 days   Lab Units 06/12/19  2251   PH, ARTERIAL pH units 7.324*   PO2 ART mm Hg 115.5*   PCO2, ARTERIAL mm Hg 87.0*    HCO3 ART mmol/L 45.2*        Results Review:    I have reviewed the relevant laboratory results and reviewed the chest imaging from the last 3 days personally and summarized it below    Assessment    S/p Lt Revision THR 6/12   AECOPD  Chronic high-dose steroids  Chronic hypoxemic respiratory failure  Chronic hypercapnic respiratory failure  CAD    PLAN:  Improved wheezing today    Add scheduled nebs.  Will inc pred to 20mg and f/u her pulm   Patient on supplemental oxygen at baseline levels.  Doing well on lower Tv @ 400 with AVAPS. On bipap @ home and says that she uses every night. Will let her be on it for now z@ dc.    Ok to dc from my end    I have discussed my findings and recommendations with patient, family and nursing staff.     Jose Tomlinson MD  6/18/2019

## 2019-06-18 NOTE — PLAN OF CARE
Problem: Patient Care Overview  Goal: Plan of Care Review  Outcome: Ongoing (interventions implemented as appropriate)   06/18/19 8100   Coping/Psychosocial   Plan of Care Reviewed With patient   OTHER   Outcome Summary fatigue limiting incr in amb dist, very agreeable to PT even though BR issues making her feel weaker today; plans SNU at [place]

## 2019-06-19 NOTE — PROGRESS NOTES
Continued Stay Note  [facility]     Patient Name: Maryjo Wynn  MRN: [id number]  Today's Date: 6/19/2019    Admit Date: 6/12/2019    Discharge Plan     Row Name 06/19/19 1053       Plan    Plan  Home with [facility]     Patient/Family in Agreement with Plan  yes    Plan Comments  Pt having diarrhea.  Cdiff pending.  Planning for discharge tomorrow.  Rashawn Kruse RN-BC        Discharge Codes    No documentation.       Expected Discharge Date and Time     Expected Discharge Date Expected Discharge Time    Jun 19, 2019             Rashawn Kruse, RN

## 2019-06-19 NOTE — PROGRESS NOTES
"  Orthopaedic Surgery   Daily Progress Note  Dr. Josh Ramírez Sr  [phone]  DEMOGRAPHICS:   · Maryjo Wynn   · Age:74 y.o.   · MRN:[id number]  · Admitted: 6/12/2019    PROCEDURE: 7 Days Post-Op s/p Procedure(s):  LT. HIP OPEN REDUCTION REVISION     /99 (BP Location: Right arm, Patient Position: Lying)   Pulse 116   Temp 97.7 °F (36.5 °C) (Oral)   Resp 20   Ht 152.4 cm (60\")   Wt 50 kg (110 lb 3.7 oz)   SpO2 95%   BMI 21.53 kg/m²     Lab Results (last 24 hours)     Procedure Component Value Units Date/Time    Basic Metabolic Panel [[id number]]  (Abnormal) Collected:  06/19/19 0431    Specimen:  Blood Updated:  06/19/19 0721     Glucose 74 mg/dL      BUN 6 mg/dL      Creatinine 0.23 mg/dL      Sodium 137 mmol/L      Potassium 4.5 mmol/L      Chloride 91 mmol/L      CO2 38.4 mmol/L      Calcium 8.8 mg/dL      eGFR Non African Amer >150 mL/min/1.73      BUN/Creatinine Ratio 26.1     Anion Gap 7.6 mmol/L     Narrative:       GFR Normal >60  Chronic Kidney Disease <60  Kidney Failure <15    CBC & Differential [[id number]] Collected:  06/19/19 0431    Specimen:  Blood Updated:  06/19/19 0654    Narrative:       The following orders were created for panel order CBC & Differential.  Procedure                               Abnormality         Status                     ---------                               -----------         ------                     CBC Auto Differential[[id number]]        Abnormal            Final result                 Please view results for these tests on the individual orders.    CBC Auto Differential [[id number]]  (Abnormal) Collected:  06/19/19 0431    Specimen:  Blood Updated:  06/19/19 0654     WBC 8.90 10*3/mm3      RBC 3.46 10*6/mm3      Hemoglobin 10.5 g/dL      Hematocrit 35.2 %      .7 fL      MCH 30.3 pg      MCHC 29.8 g/dL      RDW 15.4 %      RDW-SD 57.6 fl      MPV 11.0 fL      Platelets 268 10*3/mm3      Neutrophil % 71.5 %      Lymphocyte % 19.2 %      " Monocyte % 8.3 %      Eosinophil % 0.1 %      Basophil % 0.1 %      Immature Grans % 0.8 %      Neutrophils, Absolute 6.36 10*3/mm3      Lymphocytes, Absolute 1.71 10*3/mm3      Monocytes, Absolute 0.74 10*3/mm3      Eosinophils, Absolute 0.01 10*3/mm3      Basophils, Absolute 0.01 10*3/mm3      Immature Grans, Absolute 0.07 10*3/mm3      nRBC 0.0 /100 WBC     Clostridium Difficile Toxin - Stool, Per Rectum [[id number]] Collected:  06/19/19 0628    Specimen:  Stool from Per Rectum Updated:  06/19/19 0644    Narrative:       The following orders were created for panel order Clostridium Difficile Toxin - Stool, Per Rectum.  Procedure                               Abnormality         Status                     ---------                               -----------         ------                     Clostridium Difficile To...[[id number]]                      In process                   Please view results for these tests on the individual orders.    Clostridium Difficile Toxin, PCR - Stool, Per Rectum [[id number]] Collected:  06/19/19 0628    Specimen:  Stool from Per Rectum Updated:  06/19/19 0644          Imaging Results (last 24 hours)     ** No results found for the last 24 hours. **          Patient Care Team:  Rashi Ogden MD as PCP - General (Family Medicine)  Linda Conde MD as Consulting Physician (Obstetrics and Gynecology)  Frank Staton MD as Consulting Physician (Pulmonary Disease)  Ryan Diaz MD as Consulting Physician (Interventional Cardiology)    SUBJECTIVE  GI issues/diarrhea seems to be improving    PHYSICAL EXAM  Wound continues to look better.  Scant serous drainage present.     ASSESSMENT: Patient is a 74 y.o. female who is 7 Days Post-Op s/p Procedure(s):  LT. HIP OPEN REDUCTION REVISION     PLAN / DISPOSITION:  C. difficile culture pending.  She is on Flagyl and Imodium  Continue to mobilize  Probable hospital discharge tomorrow    Josh Ramírez Sr, MD  Orthopaedic  Surgery  [facility]  [phone]

## 2019-06-19 NOTE — PLAN OF CARE
Problem: Patient Care Overview  Goal: Individualization and Mutuality  Outcome: Ongoing (interventions implemented as appropriate)   06/19/19 0343   Individualization   Patient Specific Preferences Nothing at this time     Goal: Discharge Needs Assessment  Outcome: Ongoing (interventions implemented as appropriate)    Goal: Interprofessional Rounds/Family Conf  Outcome: Ongoing (interventions implemented as appropriate)      Problem: Skin Injury Risk (Adult)  Goal: Skin Health and Integrity  Outcome: Ongoing (interventions implemented as appropriate)      Problem: Fall Risk (Adult)  Goal: Absence of Fall  Outcome: Ongoing (interventions implemented as appropriate)      Problem: Pain, Chronic (Adult)  Goal: Acceptable Pain/Comfort Level and Functional Ability  Outcome: Outcome(s) achieved Date Met: 06/19/19      Problem: Chronic Obstructive Pulmonary Disease (Adult)  Goal: Signs and Symptoms of Listed Potential Problems Will be Absent, Minimized or Managed (Chronic Obstructive Pulmonary Disease)  Outcome: Outcome(s) achieved Date Met: 06/19/19

## 2019-06-19 NOTE — THERAPY TREATMENT NOTE
Acute Care - Physical Therapy Treatment Note  [facility]     Patient Name: Maryjo Wynn  : 1945  MRN: [id number]  Today's Date: 2019  Onset of Illness/Injury or Date of Surgery: 19     Referring Physician: Desiree    Admit Date: 2019    Visit Dx:    ICD-10-CM ICD-9-CM   1. Dislocation of left hip, initial encounter (CMS/[facility]) S73.005A 835.00   2. Dislocation of internal left hip prosthesis (CMS/[facility]) T84.021A 996.42     V43.64   3. Difficulty walking R26.2 719.7     Patient Active Problem List   Diagnosis   • Degenerative joint disease (DJD) of hip   • PNA (pneumonia)   • COPD (chronic obstructive pulmonary disease) (CMS/[facility])   • Acute on chronic respiratory failure with hypoxia and hypercapnia (CMS/[facility])   • Chronic diastolic CHF (congestive heart failure) (CMS/[facility])   • CAD (coronary artery disease)   • Asymptomatic carotid artery stenosis   • Atherosclerosis of native artery of both lower extremities with intermittent claudication (CMS/[facility])   • CAD (coronary artery disease), autologous vein bypass graft   • Chronic hypoxemic respiratory failure (CMS/[facility])   • Echocardiogram abnormal   • Fusion of spine of lumbar region   • GI bleed   • History of fusion of lumbar spine   • Hx of CABG   • Postoperative atrial fibrillation (CMS/[facility])   • Paroxysmal atrial fibrillation (CMS/[facility])   • PUD (peptic ulcer disease)   • PVD (peripheral vascular disease) (CMS/[facility])   • S/P femoral-femoral bypass surgery   • Statin intolerance   • TR (tricuspid regurgitation)   • Superior mesenteric artery stenosis (CMS/[facility])   • Closed dislocation of left hip (CMS/[facility])   • Hip dislocation, left (CMS/[facility])       Therapy Treatment    Rehabilitation Treatment Summary     Row Name 19 1335             Treatment Time/Intention    Discipline  physical therapy assistant  -WILLIAMS      Document Type  therapy note (daily note)  -WILLIAMS      Subjective Information  complains of;weakness;fatigue;pain  -WILLIAMS      Comment  did not perf HS,  hip abd/add -pt feels MD told her to avoid some exer  -JM      Existing Precautions/Restrictions  fall;hip;oxygen therapy device and L/min 2L  -JM      Recorded by [] Mary Deras, \[facility] [place] [facility]\"" 06/19/19 1339      Row Name 06/19/19 1332             Bed Mobility Assessment/Treatment    Sit-Supine [place] (Bed Mobility)  minimum assist (75% patient effort) for LEs  -JM      Bed Mobility, Safety Issues  decreased use of arms for pushing/pulling;decreased use of legs for bridging/pushing  -JM      Recorded by [] Mary Deras \[facility] [place] [facility]\"" 06/19/19 1339      Row Name 06/19/19 1331             Sit-Stand Transfer    Sit-Stand [place] (Transfers)  contact guard  -      Assistive Device (Sit-Stand Transfers)  walker, front-wheeled  -      Recorded by [] Mary Deras \[facility]\"" 06/19/19 1339      Row Name 06/19/19 133             Stand-Sit Transfer    Stand-Sit [place] (Transfers)  contact guard;verbal cues  -      Assistive Device (Stand-Sit Transfers)  walker, front-wheeled  -JM      Recorded by [] Mary Deras \[facility]\"" 06/19/19 1339      Row Name 06/19/19 1334             Gait/Stairs Assessment/Training    [place] Level (Gait)  contact guard;verbal cues  -      Assistive Device (Gait)  walker, front-wheeled  -      Distance in Feet (Gait)  40  -JM      Deviations/Abnormal Patterns (Gait)  base of support, narrow;stride length decreased  -JM      Bilateral Gait Deviations  forward flexed posture;weight shift ability decreased  -JM      Comment (Gait/Stairs)  encouraged pt to amb inside rwx for safety  -JM      Recorded by [JM] Mary Deras \[facility]\"" 06/19/19 1339      Row Name 06/19/19 1338             Therapeutic Exercise    Comment (Therapeutic Exercise)  modified THR protocol x 20 reps  -JM      Recorded by [] Mary Deras \[facility] [place] [facility]\"" 06/19/19 1339      Row Name 06/19/19 1334             Positioning and Restraints    Pre-Treatment Position  sitting in chair/recliner  -JM      In Bed  fowlers;call  light within reach;encouraged to call for assist;with nsg no alarm upon entry  -      Recorded by [WILLIAMS] Mary Deras PTA 06/19/19 1339      Row Name 06/19/19 1335             Pain Scale: Numbers Pre/Post-Treatment    Pain Scale: Numbers, Pretreatment  4/10  -JM      Pain Scale: Numbers, Post-Treatment  5/10  -JM      Pain Location - Side  Right  -JM      Pain Location  hip  -JM      Pain Intervention(s)  Medication (See MAR);Repositioned;Elevated  -JM      Recorded by [WILLIAMS] Mary Deras PTA 06/19/19 1339      Row Name                Wound 06/12/19 1754 Left hip incision    Wound - Properties Group Date first assessed: 06/12/19 [HH] Time first assessed: 1754 [HH] Side: Left [HH] Location: hip [HH] Type: incision [HH] Recorded by:  [CASPER] Sofiya Vega RN 06/12/19 1754    Row Name                Wound 06/12/19 1924 Right hand skin tear    Wound - Properties Group Date first assessed: 06/12/19 [KD] Time first assessed: 1924 [KD] Present On Admission : yes [KD] Side: Right [KD] Location: hand [KD] Type: skin tear [KD] Recorded by:  [Jackie Barrera RN 06/12/19 1925    Row Name                Wound 06/12/19 1926 Left lateral arm skin tear    Wound - Properties Group Date first assessed: 06/12/19 [KD] Time first assessed: 1926 [KD] Present On Admission : yes [KD] Side: Left [KD] Orientation: lateral [KD] Location: arm [KD] Type: skin tear [KD] Recorded by:  [Jackie Barrera RN 06/12/19 1926      User Key  (r) = Recorded By, (t) = Taken By, (c) = Cosigned By    Initials Name Effective Dates Discipline    Jackie Tong RN 11/02/18 -  Nurse    Sofiya Zamorano RN 06/16/16 -  Nurse    Mary Rodríguez PTA 03/07/18 -  PT          Wound 06/12/19 1754 Left hip incision (Active)   Dressing Appearance dry;intact 6/19/2019  8:58 AM   Closure DRAGAN 6/19/2019  8:58 AM   Base clean;dry 6/19/2019 12:00 AM   Drainage Amount none 6/19/2019 12:00 AM   Dressing Care, Wound low-adherent  6/19/2019  8:58 AM       Wound 06/12/19 1924 Right hand skin tear (Active)   Dressing Appearance open to air 6/19/2019  8:58 AM   Closure None 6/19/2019  8:58 AM   Base dry;clean;red 6/19/2019  8:58 AM   Drainage Amount none 6/19/2019 12:00 AM   Dressing Care, Wound open to air 6/18/2019  2:20 PM       Wound 06/12/19 1926 Left lateral arm skin tear (Active)   Dressing Appearance open to air 6/19/2019  8:58 AM   Closure Open to air 6/19/2019  8:58 AM   Base clean;dry;red 6/19/2019  8:58 AM   Drainage Amount none 6/19/2019  8:58 AM   Dressing Care, Wound open to air 6/18/2019  2:20 PM           Physical Therapy Education     Title: PT OT SLP Therapies (Done)     Topic: Physical Therapy (Done)     Point: Mobility training (Done)     Learning Progress Summary           Patient Acceptance, E,TB, VU by WILLIAMS at 6/19/2019  1:41 PM    Acceptance, E,TB,D, VU by WILLIAMS at 6/18/2019  3:44 PM    Eager, E,TB,D, VU by WILLIAMS at 6/17/2019  4:52 PM    Acceptance, E,TB, VU by HEYDI at 6/16/2019  1:45 PM    Acceptance, E,D, DU,NR by PC at 6/15/2019  3:29 PM    Acceptance, E,TB, VU by HEYDI at 6/13/2019  1:52 PM                   Point: Home exercise program (Done)     Learning Progress Summary           Patient Acceptance, E,TB, VU by WILLIAMS at 6/19/2019  1:41 PM    Acceptance, E,TB,D, VU by WILLIAMS at 6/18/2019  3:44 PM    Eager, E,TB,D, VU by WILLIAMS at 6/17/2019  4:52 PM    Acceptance, E,TB, VU by HEYDI at 6/16/2019  1:45 PM    Acceptance, E,D, DU,NR by SAMEER at 6/15/2019  3:29 PM    Acceptance, E,TB, VU by HEYDI at 6/13/2019  1:52 PM                   Point: Body mechanics (Done)     Learning Progress Summary           Patient Acceptance, E,TB, VU by WILLIAMS at 6/19/2019  1:41 PM    Acceptance, E,TB,D, VU by WILLIAMS at 6/18/2019  3:44 PM    EagerWALDEMAR TB,ANNA, VU by WILLIAMS at 6/17/2019  4:52 PM    Acceptance, MICKEY MEDEIROS, VU by CS at 6/16/2019  1:45 PM    Acceptance, ANNA MEDEIROS DU,NR by SAMEER at 6/15/2019  3:29 PM    Acceptance, MICKEY MEDEIROS VU by HEYDI at 6/13/2019  1:52 PM                   Point: Precautions  (Done)     Learning Progress Summary           Patient Acceptance, E,TB, VU by  at 6/19/2019  1:41 PM    Acceptance, E,TB,D, VU by  at 6/18/2019  3:44 PM    Eager, E,TB,D, VU by  at 6/17/2019  4:52 PM    Acceptance, E,TB, VU by CS at 6/16/2019  1:45 PM    Acceptance, E,D, DU,NR by  at 6/15/2019  3:29 PM    Acceptance, E,TB, VU by CS at 6/13/2019  1:52 PM                               User Key     Initials Effective Dates Name Provider Type Discipline     04/03/18 -  Jaquelin Sexton, PT Physical Therapist PT     03/07/18 -  Mary Deras PTA Physical Therapy Assistant PT    CS 05/14/18 -  Doroteo Gomes, PT Physical Therapist PT                PT Recommendation and Plan     Plan of Care Reviewed With: patient  Progress: improving  Outcome Summary: pt agreeable to PT, up in chair a while and fatigued so unable to incr dist today; plans SNU at [place]  Outcome Measures     Row Name 06/19/19 1300 06/18/19 1500 06/17/19 1600       How much help from another person do you currently need...    Turning from your back to your side while in flat bed without using bedrails?  3  -JM  3  -JM  3  -JM    Moving from lying on back to sitting on the side of a flat bed without bedrails?  3  -JM  3  -JM  3  -JM    Moving to and from a bed to a chair (including a wheelchair)?  3  -JM  3  -JM  3  -JM    Standing up from a chair using your arms (e.g., wheelchair, bedside chair)?  3  -JM  3  -JM  3  -JM    Climbing 3-5 steps with a railing?  1  -JM  2  -JM  2  -JM    To walk in hospital room?  3  -JM  3  -JM  3  -JM    AM-PAC 6 Clicks Score  16  -JM  17  -JM  17  -JM      User Key  (r) = Recorded By, (t) = Taken By, (c) = Cosigned By    Initials Name Provider Type    Mary Rodríguez PTA Physical Therapy Assistant         Time Calculation:   PT Charges     Row Name 06/19/19 1341             Time Calculation    Start Time  1308  -      Stop Time  1325  -JM      Time Calculation (min)  17 min  -JM      PT Received On   06/19/19  -WILLIAMS      PT - Next Appointment  06/20/19  -WILLIAMS        User Key  (r) = Recorded By, (t) = Taken By, (c) = Cosigned By    Initials Name Provider Type    Mary Rodríguez PTA Physical Therapy Assistant        Therapy Charges for Today     Code Description Service Date Service Provider Modifiers Qty    95791226711 HC PT THER PROC EA 15 MIN 6/18/2019 Mary Deras PTA GP 1    48272749036 HC PT THER PROC EA 15 MIN 6/19/2019 Mary Deras PTA GP 1          PT G-Codes  Outcome Measure Options: AM-PAC 6 Clicks Basic Mobility (PT)  AM-PAC 6 Clicks Score: 16    Mary Deras PTA  6/19/2019

## 2019-06-19 NOTE — PLAN OF CARE
Problem: Patient Care Overview  Goal: Plan of Care Review  Outcome: Ongoing (interventions implemented as appropriate)   06/19/19 1650   Coping/Psychosocial   Plan of Care Reviewed With patient   OTHER   Outcome Summary pt agreeable to PT, up in chair a while and fatigued so unable to incr dist today; plans SNU at [place]

## 2019-06-19 NOTE — PROGRESS NOTES
Jose Tomlinson MD                          [phone]      Patient ID:    Name:  Maryjo Wynn    MRN:  [id number]    1945   74 y.o.  female            Patient Care Team:  Rashi Ogden MD as PCP - General (Family Medicine)  Linda Conde MD as Consulting Physician (Obstetrics and Gynecology)  Frank Staton MD as Consulting Physician (Pulmonary Disease)  Ryan Diaz MD as Consulting Physician (Interventional Cardiology)    CC/ Reason for visit: Per Assessment mentioned below    Subjective: Pt seen and examined this AM. No acute overnight events noted.  Improved wheezing.  Persistent prolonged expiration.  Still in the hospital due to nausea and vomiting    ROS: Denies any subjective fevers, chest pain, nausea/ vomiting.  No new worsening cough or shortness of breath    Objective     Vital Signs past 24hrs    BP range: BP: (145-172)/(73-99) 155/84  Pulse range: Heart Rate:  [] 85  Resp rate range: Resp:  [16-23] 18  Temp range: Temp (24hrs), Av.1 °F (36.7 °C), Min:97.7 °F (36.5 °C), Max:98.6 °F (37 °C)      Ventilator/Non-Invasive Ventilation Settings (From admission, onward)    Start     Ordered    19  NIPPV (CPAP or BIPAP)  At Bedtime & As Needed-RT     Question Answer Comment   Indication: Acute Respiratory Failure    Type: AVAPS/PC/PS    NIPPV Mask Interface: Full Face Mask    Backup Rate 16    Target VT (mL) 500    EPAP/PEEP (cm H2O) 5    Min Pressure (cm H2O) 12    Max Pressure (cm H2O) 28    Titrate for SPO2 90%        19          Device (Oxygen Therapy): nasal cannula FiO2 (%): 28 %     50 kg (110 lb 3.7 oz); Body mass index is 21.53 kg/m².      Intake/Output Summary (Last 24 hours) at 2019 1752  Last data filed at 2019 1438  Gross per 24 hour   Intake 340 ml   Output 1200 ml   Net -860 ml       PHYSICAL EXAM   Constitutional: Middle aged pt in chair, No acute respiratory distress, +  accessory muscle use  Head: - NCAT  Eyes: No pallor, Anicteric conjunctiva, EOMI.  ENMT:  Mallampati 4, no oral thrush. No palpable cervical lymphadenopathy, Dry MM.   Heart: RRR, no murmur. No pedal edema   Lungs: ASHLEY +, Distant BS, ++ wheezes, No crackles heard    Abdomen: Soft. No tenderness, guarding or rigidity. No palpable masses  Extremities: Extremities warm and well perfused. Surgical changes @ hip.  Neuro: Conscious, answers appropriately, no gross focal neuro deficits  Psych: Mood and affect appropriate    Scheduled meds:      albuterol 2.5 mg Nebulization 4x Daily - RT   aspirin 81 mg Oral Daily   budesonide 0.5 mg Nebulization BID - RT   busPIRone 10 mg Oral BID   carvedilol 6.25 mg Oral BID   dicyclomine 10 mg Oral TID   ferrous sulfate 325 mg Oral Daily With Breakfast   furosemide 40 mg Oral Daily   guaiFENesin 1,200 mg Oral Q12H   losartan 25 mg Oral Daily   metroNIDAZOLE 500 mg Oral Q12H   pantoprazole 40 mg Oral Q AM   potassium chloride 20 mEq Oral BID   predniSONE 20 mg Oral Daily With Breakfast   roflumilast 500 mcg Oral Daily   sertraline 50 mg Oral Daily   sucralfate 500 mg Oral 4x Daily PC & at Bedtime   tiotropium bromide monohydrate 2 puff Inhalation Daily - RT       IV meds:                           Data Review:      Results from last 7 days   Lab Units 06/19/19  0431 06/18/19  0507 06/17/19  0507   SODIUM mmol/L 137 137 135*   POTASSIUM mmol/L 4.5 3.9 3.7   CHLORIDE mmol/L 91* 91* 89*   CO2 mmol/L 38.4* 35.7* 37.2*   BUN mg/dL 6* 5* 5*   CREATININE mg/dL 0.23* 0.28* 0.22*   CALCIUM mg/dL 8.8 9.1 8.6   GLUCOSE mg/dL 74 88 89   WBC 10*3/mm3 8.90 10.27 9.78   HEMOGLOBIN g/dL 10.5* 11.0* 10.1*   PLATELETS 10*3/mm3 268 252 231       Lab Results   Component Value Date    CALCIUM 8.8 06/19/2019             Results from last 7 days   Lab Units 06/12/19  2251   PH, ARTERIAL pH units 7.324*   PO2 ART mm Hg 115.5*   PCO2, ARTERIAL mm Hg 87.0*   HCO3 ART mmol/L 45.2*        Results Review:    I have  reviewed the relevant laboratory results and reviewed the chest imaging from the last 3 days personally and summarized it below    Assessment    S/p Lt Revision THR 6/12   AECOPD  Chronic high-dose steroids  Chronic hypoxemic respiratory failure  Chronic hypercapnic respiratory failure  CAD    PLAN:  Improved wheezing today    Add scheduled nebs.  Will C/W pred @ 20mg and f/u her pulm   Patient on supplemental oxygen at baseline levels.  Doing well on lower Tv @ 400 with AVAPS. On bipap @ home and says that she uses every night. Will let her be on it for now z@ dc.    Ok to dc from my end.  We will sign off.  Please call with questions    I have discussed my findings and recommendations with patient, family and nursing staff.     Jose Tomlinson MD  6/19/2019

## 2019-06-19 NOTE — PLAN OF CARE
Problem: Patient Care Overview  Goal: Plan of Care Review  Outcome: Ongoing (interventions implemented as appropriate)   06/19/19 8477   Coping/Psychosocial   Plan of Care Reviewed With patient   Plan of Care Review   Progress improving   OTHER   Outcome Summary VSS. Stool negative for cdiff. Still having abd pain. Up with every meal. Continue to monitor.      Goal: Individualization and Mutuality  Outcome: Ongoing (interventions implemented as appropriate)    Goal: Discharge Needs Assessment  Outcome: Ongoing (interventions implemented as appropriate)    Goal: Interprofessional Rounds/Family Conf  Outcome: Ongoing (interventions implemented as appropriate)      Problem: Skin Injury Risk (Adult)  Goal: Skin Health and Integrity  Outcome: Ongoing (interventions implemented as appropriate)      Problem: Fall Risk (Adult)  Goal: Absence of Fall  Outcome: Ongoing (interventions implemented as appropriate)      Problem: Chronic Obstructive Pulmonary Disease (Adult)  Goal: Signs and Symptoms of Listed Potential Problems Will be Absent, Minimized or Managed (Chronic Obstructive Pulmonary Disease)  Outcome: Ongoing (interventions implemented as appropriate)

## 2019-06-20 NOTE — PROGRESS NOTES
"  Orthopaedic Surgery   Daily Progress Note  Dr. Josh Ramírez Sr  [phone]  DEMOGRAPHICS:   · Maryjo Wynn   · Age:74 y.o.   · MRN:[id number]  · Admitted: 6/12/2019    PROCEDURE: 8 Days Post-Op s/p Procedure(s):  LT. HIP OPEN REDUCTION REVISION     /85 (BP Location: Left arm, Patient Position: Lying)   Pulse 77   Temp 98.5 °F (36.9 °C) (Oral)   Resp 18   Ht 152.4 cm (60\")   Wt 50 kg (110 lb 3.7 oz)   SpO2 97%   BMI 21.53 kg/m²     Lab Results (last 24 hours)     Procedure Component Value Units Date/Time    CBC & Differential [[id number]] Collected:  06/20/19 0435    Specimen:  Blood Updated:  06/20/19 0605    Narrative:       The following orders were created for panel order CBC & Differential.  Procedure                               Abnormality         Status                     ---------                               -----------         ------                     CBC Auto Differential[[id number]]        Abnormal            Final result                 Please view results for these tests on the individual orders.    CBC Auto Differential [[id number]]  (Abnormal) Collected:  06/20/19 0435    Specimen:  Blood Updated:  06/20/19 0605     WBC 10.44 10*3/mm3      RBC 3.49 10*6/mm3      Hemoglobin 10.3 g/dL      Hematocrit 35.0 %      .3 fL      MCH 29.5 pg      MCHC 29.4 g/dL      RDW 15.1 %      RDW-SD 56.0 fl      MPV 11.2 fL      Platelets 315 10*3/mm3      Neutrophil % 74.0 %      Lymphocyte % 17.0 %      Monocyte % 7.8 %      Eosinophil % 0.3 %      Basophil % 0.2 %      Immature Grans % 0.7 %      Neutrophils, Absolute 7.73 10*3/mm3      Lymphocytes, Absolute 1.78 10*3/mm3      Monocytes, Absolute 0.81 10*3/mm3      Eosinophils, Absolute 0.03 10*3/mm3      Basophils, Absolute 0.02 10*3/mm3      Immature Grans, Absolute 0.07 10*3/mm3      nRBC 0.0 /100 WBC     Basic Metabolic Panel [[id number]]  (Abnormal) Collected:  06/20/19 0435    Specimen:  Blood Updated:  06/20/19 0556     Glucose 82 " mg/dL      BUN 5 mg/dL      Creatinine 0.21 mg/dL      Sodium 136 mmol/L      Potassium 3.7 mmol/L      Chloride 91 mmol/L      CO2 34.5 mmol/L      Calcium 8.1 mg/dL      eGFR Non African Amer >150 mL/min/1.73      BUN/Creatinine Ratio 23.8     Anion Gap 10.5 mmol/L     Narrative:       GFR Normal >60  Chronic Kidney Disease <60  Kidney Failure <15    Clostridium Difficile Toxin - Stool, Per Rectum [[id number]] Collected:  06/19/19 0628    Specimen:  Stool from Per Rectum Updated:  06/19/19 0829    Narrative:       The following orders were created for panel order Clostridium Difficile Toxin - Stool, Per Rectum.  Procedure                               Abnormality         Status                     ---------                               -----------         ------                     Clostridium Difficile To...[[id number]]  Normal              Final result                 Please view results for these tests on the individual orders.    Clostridium Difficile Toxin, PCR - Stool, Per Rectum [[id number]]  (Normal) Collected:  06/19/19 0628    Specimen:  Stool from Per Rectum Updated:  06/19/19 0829     C. Difficile Toxins by PCR Negative    Basic Metabolic Panel [[id number]]  (Abnormal) Collected:  06/19/19 0431    Specimen:  Blood Updated:  06/19/19 0721     Glucose 74 mg/dL      BUN 6 mg/dL      Creatinine 0.23 mg/dL      Sodium 137 mmol/L      Potassium 4.5 mmol/L      Chloride 91 mmol/L      CO2 38.4 mmol/L      Calcium 8.8 mg/dL      eGFR Non African Amer >150 mL/min/1.73      BUN/Creatinine Ratio 26.1     Anion Gap 7.6 mmol/L     Narrative:       GFR Normal >60  Chronic Kidney Disease <60  Kidney Failure <15    CBC & Differential [[id number]] Collected:  06/19/19 0431    Specimen:  Blood Updated:  06/19/19 0654    Narrative:       The following orders were created for panel order CBC & Differential.  Procedure                               Abnormality         Status                     ---------                                -----------         ------                     CBC Auto Differential[[id number]]        Abnormal            Final result                 Please view results for these tests on the individual orders.    CBC Auto Differential [[id number]]  (Abnormal) Collected:  06/19/19 0431    Specimen:  Blood Updated:  06/19/19 0654     WBC 8.90 10*3/mm3      RBC 3.46 10*6/mm3      Hemoglobin 10.5 g/dL      Hematocrit 35.2 %      .7 fL      MCH 30.3 pg      MCHC 29.8 g/dL      RDW 15.4 %      RDW-SD 57.6 fl      MPV 11.0 fL      Platelets 268 10*3/mm3      Neutrophil % 71.5 %      Lymphocyte % 19.2 %      Monocyte % 8.3 %      Eosinophil % 0.1 %      Basophil % 0.1 %      Immature Grans % 0.8 %      Neutrophils, Absolute 6.36 10*3/mm3      Lymphocytes, Absolute 1.71 10*3/mm3      Monocytes, Absolute 0.74 10*3/mm3      Eosinophils, Absolute 0.01 10*3/mm3      Basophils, Absolute 0.01 10*3/mm3      Immature Grans, Absolute 0.07 10*3/mm3      nRBC 0.0 /100 WBC           Imaging Results (last 24 hours)     ** No results found for the last 24 hours. **          Patient Care Team:  Rashi Ogden MD as PCP - General (Family Medicine)  Linda Conde MD as Consulting Physician (Obstetrics and Gynecology)  Frank Staton MD as Consulting Physician (Pulmonary Disease)  Ryan Diaz MD as Consulting Physician (Interventional Cardiology)    SUBJECTIVE  Fairly comfortable  Diarrhea better    PHYSICAL EXAM  Wound looks good.  Scant drainage.     ASSESSMENT: Patient is a 74 y.o. female who is 8 Days Post-Op s/p Procedure(s):  LT. HIP OPEN REDUCTION REVISION     PLAN / DISPOSITION:  Discharge home today.    Josh Ramírez Sr, MD  Orthopaedic Surgery  [facility]  [phone]

## 2019-06-20 NOTE — DISCHARGE SUMMARY
Discharge Summary   Josh Ramírez M.D.    NAME: Maryjo Wynn ADMIT: 2019   : 1945  PCP: Rashi Ogden MD    MRN: [id number] LOS: 8 days   AGE/SEX: 74 y.o. female  ROOM: E448/       Date of Discharge:  19    Primary Discharge Diagnosis:  Dislocation of left hip, initial encounter (CMS/[facility]) [S73.005A]    Secondary Discharge Diagnosis:    Problem List Items Addressed This Visit        Musculoskeletal and Integument    Degenerative joint disease (DJD) of hip    Relevant Orders    Referral to home health    Hip dislocation, left (CMS/[facility]) - Primary      Other Visit Diagnoses     Dislocation of internal left hip prosthesis (CMS/[facility])        Relevant Orders    Anaerobic Culture - Wound, Hip, Left (Completed)    Wound Culture - Wound, Hip, Left (Completed)    Difficulty walking              Procedures Performed: Revision left Total Hip Arthroplasty      Hospital Course:    Maryjo Wynn is a 74 y.o.  female who underwent successful revision left Total Hip Arthroplasty on 2019.  Indications for this procedure was a dislocation due to abductor musculature evulsion from the greater trochanter.  Maryjo Wynn was started on Aspirin 81 mg po daily immediately post-operatively for DVT prophylaxis.  On post-op day 1 the patients dressing was changed and their incision was clean, with no signs of infection and their calf was soft, with no signs of DVT.  Throughout her  hospital stay the pulmonary team aided us in treating her severe oxygen dependent COPD.  She developed diarrhea midway through her hospital stay.  We have placed her on Keflex because she has some serous drainage after surgery.  This was discontinued.  I started oral Flagyl.  C. difficile culture was negative though we are discontinuing the Flagyl.  The patient progressed reasonably well with physical therapy and the patients hemoglobin remained stable. On post-operative day 8 the patient was felt ready for discharge.       Total Hip Replacement Discharge Instructions:    I. ACTIVITIES:    1. Exercises:  ? Complete exercise program as taught by the hospital physical therapist 2 times per day  ? Exercise program will be advanced by the physical therapist  ? During the day be up ambulating every 2 hours (while awake) for short distances  ? Complete the ankle pump exercises at least 10 times per hour (while awake)  ? Elevate legs when in bed and for at least 30 minutes during the day.Use cold packs 20-30 minutes approximately 5 times per day. This should be done before and after completing your exercises and at any time you are experiencing pain/ stiffness in your operative extremity.    2. Activities of Daily Living:  ? No tub baths, hot tubs, or swimming pools for 4 weeks  ? May shower and let water run over the incision on post-operative day #5 if no drainage. Do not scrub or rub the incision. Simply let the water run over the incision and pat dry.    II. Precautions:  ? Everyone that comes near you should wash their hands  ? No elective dental, genital-urinary, or colon procedures or surgical procedures for 12 weeks after surgery unless absolutely necessary.  ? If dental work or surgical procedure is deemed absolutely necessary during the first 12 weeks, you will need to contact your surgeon as you will need to take antibiotics 1 hour prior to any dental work (including teeth cleanings).Please discuss with your surgeon prophylactic antibiotics as the length of time this intervention will be necessary for you varies with each patient’s health history and situation.  ? Avoid sick people. If you must be around someone who is ill, they should wear a mask.  ? Avoid visits to the Emergency Room or Urgent Care unless you are having a life threatening event.   ? If ordered stockings are to be placed on in the morning and removed at night. Monitor the stockings to ensure that any swelling is not causing the stockings to become too tight.  In this case, remove stockings immediately.    III. INCISION CARE:    ? Wash your hands prior to dressing changes  ? Change the dressing as needed to keep incision clean and dry. Utilize dry gauze and paper tape. Avoid touching the side of the gauze that goes against the incision with your hands.  ? No creams or ointments to the incision  ? May remove dressing once the incision is free of drainage  ? Do not touch or pick at the incision  ? Check incision every day and notify surgeon immediately if any of the following signs or symptoms are noted:  o Increase in redness  o Increase in swelling around the incision and of the entire extremity  o Increase in pain  o Drainage oozing from the incision  o Pulling apart of the edges of the incision  o Increase in overall body temperature (greater than 100.5 degrees)  ? Your surgeon will instruct you regarding suture or staple removal    IV. Medications:     1. Anticoagulants: You will be discharged on an anticoagulant. This is a prophylactic medication that helps prevent blood clots during your post-operative period. The type and length of dosage varies based on your individual needs, procedure performed, and surgeon’s preference.  ? While taking the anticoagulant, you should avoid taking any additional aspirin, ibuprofen (Advil or Motrin), Aleve (Naprosyn) or other non-steroidal anti-inflammatory medications.   ? Notify surgeon immediately if any marek bleeding is noted in the urine, stool, emesis, or from the nose or the incision. Blood in the stool will often appear as black rather than red. Blood in urine may appear as pink. Blood in emesis may appear as brown/black like coffee grounds.  ? You will need to apply pressure for longer periods of time to any cuts or abrasions to stop bleeding  ? Avoid alcohol while taking anticoagulants    2. Stool Softeners: You will be at greater risk of constipation after surgery due to being less mobile and the pain medications.    ? Take stool softeners as instructed by your surgeon while on pain medications. Bran cereal is most effective. Over the counter Colace 100 mg 1-2 capsules twice daily.   ? If stools become too loose or too frequent, please decreases the dosage or stop the stool softener.  ? If constipation occurs despite use of stool softeners, you are to continue the stool softeners and add a laxative (Milk of Magnesia 1 ounce daily as needed)  ? Drink plenty of fluids, and eat fruits and vegetables during your recovery time    3. Pain Medications utilized after surgery are narcotics and the law requires that the following information be given to all patients that are prescribed narcotics:  ? CLASSIFICATION: Pain medications are called Opioids and are narcotics  ? LEGALITIES: It is illegal to share narcotics with others and to drive within 24 hours of taking narcotics  ? POTENTIAL SIDE EFFECTS: Potential side effects of opioids include: nausea, vomiting, itching, dizziness, drowsiness, dry mouth, constipation, and difficulty urinating.  ? POTENTIAL ADVERSE EFFECTS:   o Opioid tolerance can develop with use of pain medications and this simply means that it requires more and more of the medication to control pain; however, this is seen more in patients that use opioids for longer periods of time.  o Opioid dependence can develop with use of Opioids and this simply means that to stop the medication can cause withdrawal symptoms; however, this is seen with patients that use Opioids for longer periods of time.  o Opioid addiction can develop with use of Opioids and the incidence of this is very unlikely in patients who take the medications as ordered and stop the medications as instructed.  o Opioid overdose can be dangerous, but is unlikely when the medication is taken as ordered and stopped when ordered. It is important not to mix opioids with alcohol or with and type of sedative such as Benadryl as this can lead to over sedation  and respiratory difficulty.  ? DOSAGE:   o Pain medications will need to be taken consistently for the first week to decrease pain and promote adequate pain relief and participation in physical therapy.  o After the initial surgical pain begins to resolve, you may begin to decrease the pain medication. By the end of 6-8 weeks, you should be off of pain medications.  o Refills will not be given by the office during evening hours, on weekends, or after 6-8 weeks post-op.  o To seek refills on pain medications during the initial 6 week post-operative period, you must call the office 48 hours in advance to request the refill. The office will then notify you when to  the prescription. DO NOT wait until you are out of the medication to request a refill.    V. FOLLOW-UP VISITS:  ? You will need to follow up in the office with your surgeon in 3 weeks. Please call this number [phone]  to schedule this appointment.  If you have any concerns or suspected complications prior to your follow up visit, please call your surgeons office. Do not wait until your appointment time if you suspect complications. These will need to be addressed in the office promptly.    Discharge Medications:    1) hydrocodone 7.5/325 1-2 po q 4-6 hours prn pain  2)  Enteric Coated Aspirin 81 mg po daily for 6 weeks.      Josh Ramírez MD  6/20/2019  6:53 AM

## 2019-06-20 NOTE — PLAN OF CARE
Problem: Patient Care Overview  Goal: Discharge Needs Assessment  Outcome: Ongoing (interventions implemented as appropriate)   06/13/19 1142 06/13/19 1345   Discharge Needs Assessment   Readmission Within the Last 30 Days no previous admission in last 30 days --    Concerns to be Addressed no discharge needs identified;denies needs/concerns at this time --    Patient/Family Anticipates Transition to home with help/services --    Patient/Family Anticipated Services at Transition none --    Transportation Concerns --  car, none   Transportation Anticipated family or friend will provide --    Anticipated Changes Related to Illness none --    Equipment Needed After Discharge none --    Outpatient/Agency/Support Group Needs homecare agency --    Disability   Equipment Currently Used at Home walker, rolling;oxygen;bipap/cpap;grab bar;shower chair --      Goal: Interprofessional Rounds/Family Conf  Outcome: Ongoing (interventions implemented as appropriate)      Problem: Skin Injury Risk (Adult)  Goal: Skin Health and Integrity  Outcome: Ongoing (interventions implemented as appropriate)      Problem: Fall Risk (Adult)  Goal: Absence of Fall  Outcome: Outcome(s) achieved Date Met: 06/20/19

## 2019-06-21 NOTE — OUTREACH NOTE
Prep Survey      Responses   Facility patient discharged from?  [facility]   Is patient eligible?  Yes   Discharge diagnosis  left hip open reduction revision   Does the patient have one of the following disease processes/diagnoses(primary or secondary)?  General Surgery   Does the patient have Home health ordered?  Yes   What is the Home health agency?   [facility]    Is there a DME ordered?  No   Prep survey completed?  Yes          Gunjan Littlejohn RN

## 2019-06-21 NOTE — PROGRESS NOTES
Case Management Discharge Note    Final Note: Discharged home with [facility].  Transported home by family.      Destination      No service has been selected for the patient.      Durable Medical Equipment      No service has been selected for the patient.      Dialysis/Infusion      No service has been selected for the patient.      Home Medical Care - Selection Complete      Service Provider Request Status Selected Services Address Phone Number Fax Number    [facility] [address] [address] [phone]      Therapy      No service has been selected for the patient.      Community Resources      No service has been selected for the patient.        Transportation Services  Private: Car    Final Discharge Disposition Code: 06 - home with home health care

## 2019-06-24 PROBLEM — J96.02 ACUTE RESPIRATORY FAILURE WITH HYPERCAPNIA (HCC): Status: ACTIVE | Noted: 2019-01-01

## 2019-06-24 NOTE — OUTREACH NOTE
General Surgery Week 1 Survey      Responses   Facility patient discharged from?  [facility]   Does the patient have one of the following disease processes/diagnoses(primary or secondary)?  General Surgery   Is there a successful TCM telephone encounter documented?  No   Week 1 attempt successful?  No   Revoke  Readmitted          Christa Bianchi RN

## 2019-07-01 NOTE — PLAN OF CARE
Problem: Patient Care Overview  Goal: Plan of Care Review  Outcome: Ongoing (interventions implemented as appropriate)   07/01/19 4564   Coping/Psychosocial   Plan of Care Reviewed With patient;spouse;daughter;family   Plan of Care Review   Progress declining   OTHER   Outcome Summary Pt transferred to [facility] at 1234. Pt arrived to unit minimally responsive. Pt premedicated prior to q 4hr turns. Pt has declined significantly during this shift. educated family on active dying changes in breathing etc. Family vu. Pt ordered a low air loss mattress d/t very fragile skin and breakdown. Will continue comfort measures and monitoring per protocol.      Goal: Individualization and Mutuality  Outcome: Ongoing (interventions implemented as appropriate)      Problem: Skin Injury Risk (Adult)  Goal: Skin Health and Integrity  Outcome: Ongoing (interventions implemented as appropriate)      Problem: Dying Patient, Actively (Adult)  Goal: Comfort/Pain Control  Outcome: Ongoing (interventions implemented as appropriate)    Goal: Peace/Preservation of Dignity During the Dying Process  Outcome: Ongoing (interventions implemented as appropriate)

## 2019-07-01 NOTE — PLAN OF CARE
Problem: Patient Care Overview  Goal: Plan of Care Review  Outcome: Ongoing (interventions implemented as appropriate)   07/01/19 0368   Plan of Care Review   Progress no change   OTHER   Outcome Summary Pt extubated and moved to pallative care.

## 2019-07-01 NOTE — CONSULTS
Adult Nutrition  Assessment/PES    Patient Name:  Maryjo Wynn  YOB: 1945  MRN: [id number]  Admit Date:  6/24/2019    Assessment Date:  7/1/2019    Comments:  TF follow up. Pt receiving Isosource HN at 45ml/hr and water flush 30cc 4 hr. Tf's at goal rate and pt tolerating. Will continue to follow.    Reason for Assessment     Row Name 07/01/19 0729          Reason for Assessment    Reason For Assessment  follow-up protocol;TF/PN           Anthropometrics     Row Name 07/01/19 0037          Anthropometrics    Weight  54.9 kg (121 lb 0.5 oz)         Labs/Tests/Procedures/Meds     Row Name 07/01/19 0729          Labs/Procedures/Meds    Lab Results Reviewed  reviewed     Lab Results Comments  glu, mg, trig        Diagnostic Tests/Procedures    Diagnostic Test/Procedure Reviewed  reviewed        Medications    Pertinent Medications Reviewed  reviewed     Pertinent Medications Comments  abx. insulin, lovenox, steroid, senokot, propofol         Physical Findings     Row Name 07/01/19 0731          Physical Findings    Overall Physical Appearance  on ventilator support     Gastrointestinal  feeding tube     Tubes  nasogastric tube     Skin  other (see comments);pressure injury incision, coccyx abrasion, left cheek PI, posterior perirectal red           Nutrition Prescription Ordered     Row Name 07/01/19 0735          Nutrition Prescription PO    Current PO Diet  NPO        Nutrition Prescription EN    Enteral Route  NG     Product  Isosource HN (Osmolite 1.2)     TF Delivery Method  Continuous     Continuous TF Goal Rate (mL/hr)  45 mL/hr     Continuous TF Current Rate (mL/hr)  45 mL/hr     Water flush (mL)   30 mL     Water Flush Frequency  Every 4 hours        Propofol Considerations    Propofol (mL/hr)  9 mL/hr     Propofol (Kcal/day)  237.6 Kcal/day         Evaluation of Received Nutrient/Fluid Intake     Row Name 07/01/19 0794          Calories Evaluation    Enteral Calories (kcal)  1296     Other  Calories (kcal)  237     Total Calories (kcal)  1533     % of Kcal Needs  102        Protein Evaluation    Enteral Protein (gm)  58     % of Protein Needs  98        Fluid Intake Evaluation    Enteral (Free Water) Fluid (mL)  874     Free Water Flush Fluid (mL)  180     Total Free Water Intake (mL)  1054 mL               Problem/Interventions:            Intervention Goal     Row Name 07/01/19 0740          Intervention Goal    General  Maintain nutrition;Nutrition support treatment     TF/PN  Tolerate TF at goal     Transition  TF to PO     Weight  Maintain weight         Nutrition Intervention     Row Name 07/01/19 0741          Nutrition Intervention    RD/Tech Action  Follow Tx progress;Care plan reviewd           Education/Evaluation     Row Name 07/01/19 0741          Monitor/Evaluation    Monitor  Per protocol;I&O;TF delivery/tolerance;Weight;Skin status           Electronically signed by:  Janae Woods RD  07/01/19 7:41 AM

## 2019-07-01 NOTE — PLAN OF CARE
"Problem: Patient Care Overview  Goal: Plan of Care Review  Outcome: Ongoing (interventions implemented as appropriate)   07/01/19 0738   Coping/Psychosocial   Plan of Care Reviewed With patient   OTHER   Outcome Summary Plan per day shift report extubate at 0900 this day pt had a quiet night sadly no change in status pt concerned about her arms and hands being so \"swollen.\" had to stright cath to obrtain 500 cc uop no bm last night am labs pending results bradcardiac at times likely due to precedex        Problem: Skin Injury Risk (Adult)  Goal: Identify Related Risk Factors and Signs and Symptoms  Outcome: Ongoing (interventions implemented as appropriate)      Problem: Pain, Chronic (Adult)  Goal: Acceptable Pain/Comfort Level and Functional Ability  Outcome: Ongoing (interventions implemented as appropriate)      Problem: Restraint, Nonbehavioral (Nonviolent)  Goal: Rationale and Justification  Outcome: Ongoing (interventions implemented as appropriate)    Goal: Nonbehavioral (Nonviolent) Restraint: Absence of Injury/Harm  Outcome: Ongoing (interventions implemented as appropriate)    Goal: Nonbehavioral (Nonviolent) Restraint: Achievement of Discontinuation Criteria  Outcome: Ongoing (interventions implemented as appropriate)    Goal: Nonbehavioral (Nonviolent) Restraint: Preservation of Dignity and Wellbeing  Outcome: Ongoing (interventions implemented as appropriate)      Problem: Ventilation, Mechanical Invasive (Adult)  Goal: Signs and Symptoms of Listed Potential Problems Will be Absent, Minimized or Managed (Ventilation, Mechanical Invasive)  Outcome: Ongoing (interventions implemented as appropriate)      Problem: Breathing Pattern Ineffective (Adult)  Goal: Identify Related Risk Factors and Signs and Symptoms  Outcome: Ongoing (interventions implemented as appropriate)    Goal: Effective Oxygenation/Ventilation  Outcome: Ongoing (interventions implemented as appropriate)      Problem: Chronic Obstructive " Pulmonary Disease (Adult)  Goal: Signs and Symptoms of Listed Potential Problems Will be Absent, Minimized or Managed (Chronic Obstructive Pulmonary Disease)  Outcome: Ongoing (interventions implemented as appropriate)

## 2019-07-01 NOTE — PROGRESS NOTES
PROGRESS NOTE  Patient Name: Maryjo Wynn  Age/Sex: 74 y.o. female  : 1945  MRN: [id number]    Date of Admission: 2019  Date of Encounter Visit: 19   LOS: 7 days   Patient Care Team:  Rashi Ogden MD as PCP - General (Family Medicine)  Linda Conde MD as Consulting Physician (Obstetrics and Gynecology)  Frank Staton MD as Consulting Physician (Pulmonary Disease)  Ryan Diaz MD as Consulting Physician (Interventional Cardiology)    Chief Complaint:  intubated on 2019    Hospital course: Patient has end-stage COPD with chronic respiratory failure, frequent hospitalization recently, came in with COPD exacerbation and was started on bronchodilators and steroids.  She use noninvasive positive pressure ventilation at home with a trilogy machine     Interval History: Patient had a sudden onset decompensation on 2019 and developed mucous plug with no ventilation to the left lung.  Was intubated and had a bedside emergent bronchoscopy with retrieval of multiple large mucous plug blocking the left main bronchus and even some mucus in the right lower lobe as well.  Repeat bronchoscopy on 2019 showed no more retained thick secretions.   patient is having difficulty weaning off the ventilator, with low tidal volume even on pressure support of 16, the wheezing is better, she is on steroids and on antibiotic for gram-negative bacilli on her BAL.  The family at the bedside this morning including the POA, they were concerned about her declining quality of life and we had a detailed discussion about palliative care and end-of-life comfort measures.    REVIEW OF SYSTEMS:   CONSTITUTIONAL: no fever or chills  CARDIOVASCULAR: No chest pain, chest pressure or chest discomfort. No palpitations or edema.   RESPIRATORY: Respiratory failure on the ventilator.   GASTROINTESTINAL: No anorexia, nausea, vomiting or diarrhea.  Large rectal prolapse .   HEMATOLOGIC: No  "bleeding or multiple bruises from previous IV sites and needlesticks.   PSYCH: Calm    Ventilator/Non-Invasive Ventilation Settings (From admission, onward)    Start     Ordered    06/27/19 1549  Ventilator - AC/VC; (14); 100; 90%; 5; 450  Continuous     Question Answer Comment   Vent Mode AC/VC    Breath rate  14   FiO2 100    FiO2 titrate for Sp02% =/> 90%    PEEP 5    Tidal Volume 450        06/27/19 1549    06/24/19 1941  Ventilator - AC/VC; (18); 40; 90%; 5; 400  Continuous,   Status:  Canceled     Comments:  Decrease rate to 10BPM & ABG 1 hr   Question Answer Comment   Vent Mode AC/VC    Breath rate  18   FiO2 40    FiO2 titrate for Sp02% =/> 90%    PEEP 5    Tidal Volume 400        06/24/19 1941    06/24/19 1750  Ventilator - AC/VC; (18); 40; 90%; 5; 400  Continuous,   Status:  Canceled     Question Answer Comment   Vent Mode AC/VC    Breath rate  18   FiO2 40    FiO2 titrate for Sp02% =/> 90%    PEEP 5    Tidal Volume 400        06/24/19 1830            Vital Signs  Temp:  [97.7 °F (36.5 °C)-98.7 °F (37.1 °C)] 98.5 °F (36.9 °C)  Heart Rate:  [57-80] 68  Resp:  [10-14] 10  BP: (104-156)/(59-85) 104/69  FiO2 (%):  [30 %] 30 %  SpO2:  [94 %-100 %] 94 %  on    Device (Oxygen Therapy): ventilator    Intake/Output Summary (Last 24 hours) at 7/1/2019 0940  Last data filed at 7/1/2019 0600  Gross per 24 hour   Intake 5073.9 ml   Output 500 ml   Net 4573.9 ml     Flowsheet Rows      First Filed Value   Admission Height  152.4 cm (60\") Documented at 06/24/2019 1531   Admission Weight  49.8 kg (109 lb 12.8 oz) Documented at 06/24/2019 1531        Body mass index is 23.64 kg/m².      06/29/19  0547 06/30/19  0034 07/01/19  0037   Weight: 48.7 kg (107 lb 5.8 oz) 51.4 kg (113 lb 5.1 oz) (weight times three taken ) 54.9 kg (121 lb 0.5 oz)       Physical Exam:  GEN: Orally intubated, on the ventilator, on sedation but arousable and responsive,no longer  anxious, following commands    EYES:   Sclera clear. No icterus. PERRL. " Normal EOM  ENT:   External ears/nose normal, no oral lesions, no thrush, mucous membranes moist, oral ET tube  NECK:  Supple, midline trachea, no JVD  LUNGS: Normal chest on inspection, diminished breath sounds bilaterally, wheezing is much less prominent, no crackles anteriorly with positive crackles posteriorly over the bases .   CV:  Regular rhythm and rate. Normal S1/S2. No murmurs, gallops, or rubs noted.  ABD:  Soft, nontender, and non-distended. Normal bowel sounds. No guarding.  She has more than 2 inch of rectal prolapse, no significant change in the small area of mucosal irritation, no bleeding or ulceration  EXT:  Moves all extremities well. No cyanosis. No redness.  Positive edema of the upper extremities bilaterally from previously infiltrated IV sites .  Palpable pulses are equal and symmetrical, slightly reduced.    Skin: dry, intact, no bleeding but positive ecchymosis spots  PSYCH: Calm, appropriate and responsive  Results Review:      Results from last 7 days   Lab Units 06/30/19  0632 06/29/19  0648 06/28/19  0900 06/27/19  2035 06/27/19  0431 06/26/19  0430 06/25/19  0610  06/24/19  1555   SODIUM mmol/L 144  --   --   --  137 138 138  --  139   POTASSIUM mmol/L 3.8 4.0 4.3 4.6 2.8* 3.9 4.7   < > 3.2*   CHLORIDE mmol/L 105  --   --   --  91* 91* 85*  --  80*   CO2 mmol/L 32.0*  --   --   --  31.3* 31.1* 43.0*  --  48.0*   BUN mg/dL 6*  --   --   --  7* 7* 6*  --  3*   CREATININE mg/dL <0.17*  --   --   --  0.18* 0.29* 0.34*  --  0.30*   CALCIUM mg/dL 8.2*  --   --   --  8.6 8.0* 8.2*  --  8.9   AST (SGOT) U/L  --   --   --   --   --   --  12  --  18   ALT (SGPT) U/L  --   --   --   --   --   --  12  --  14   ANION GAP mmol/L 7.0  --   --   --  14.7 15.9 10.0  --  11.0   ALBUMIN g/dL  --   --   --   --   --   --  3.00*  --  3.60    < > = values in this interval not displayed.     Results from last 7 days   Lab Units 06/24/19  0445   TROPONIN T ng/mL <0.010         Results from last 7 days   Lab  Units 06/24/19  1555   PROBNP pg/mL 1,466.0     Results from last 7 days   Lab Units 06/27/19  0431 06/26/19  0430 06/25/19  0610 06/24/19  1555   WBC 10*3/mm3 14.44* 14.53* 11.67* 13.10*   HEMOGLOBIN g/dL 11.1* 11.5* 10.7* 12.4   HEMATOCRIT % 35.7 38.0 36.1 43.2   PLATELETS 10*3/mm3 326 354 349 398   MCV fL 98.1* 101.1* 101.4* 103.6*   NEUTROPHIL % %  --   --  82.8* 91.9*   LYMPHOCYTE % %  --   --  11.2* 5.6*   MONOCYTES % %  --   --  5.2 1.5*   EOSINOPHIL % %  --   --  0.0* 0.1*   BASOPHIL % %  --   --  0.1 0.1   IMM GRAN % %  --   --  0.7*  --      Results from last 7 days   Lab Units 06/24/19  1555   INR  0.86*     Results from last 7 days   Lab Units 06/30/19  0632 06/29/19  0648 06/28/19  0900 06/26/19  0430   MAGNESIUM mg/dL 1.8 2.0 1.3* 1.8     Results from last 7 days   Lab Units 06/26/19  1153   TRIGLYCERIDES mg/dL 265*     Results from last 7 days   Lab Units 06/27/19  1708 06/27/19  0727 06/25/19  0412 06/24/19  2127 06/24/19  1904 06/24/19  1619   PH, ARTERIAL pH units 7.409 7.393 7.494* 7.521* 7.577* 7.289*   PCO2, ARTERIAL mm Hg 55.4* 57.6* 60.4* 60.2* 53.1* 109.6*   PO2 ART mm Hg 166.2* 63.0* 76.2* 67.5* 85.3 74.0*   HCO3 ART mmol/L 35.1* 35.1* 46.5* 49.2* 49.5* 52.5*     Results from last 7 days   Lab Units 06/24/19  1555   HEMOGLOBIN A1C % 5.48     Glucose   Date/Time Value Ref Range Status   07/01/2019 0710 130 70 - 130 mg/dL Final   06/30/2019 2308 147 (H) 70 - 130 mg/dL Final   06/30/2019 1531 174 (H) 70 - 130 mg/dL Final   06/30/2019 1124 144 (H) 70 - 130 mg/dL Final   06/30/2019 0717 144 (H) 70 - 130 mg/dL Final   06/30/2019 0344 146 (H) 70 - 130 mg/dL Final   06/29/2019 2323 142 (H) 70 - 130 mg/dL Final   06/29/2019 1913 159 (H) 70 - 130 mg/dL Final     Results from last 7 days   Lab Units 06/24/19  1555   PROCALCITONIN ng/mL 0.04*     Results from last 7 days   Lab Units 06/27/19  1621   RESPCX  Moderate growth (3+) Gram Negative Bacilli*  Light growth (2+) Normal Respiratory Ally      Results from last 7 days   Lab Units 06/27/19  1422   NITRITE UA  Negative     Results from last 7 days   Lab Units 06/24/19  2208   ADENOVIRUS DETECTION BY PCR  Not Detected   CORONAVIRUS 229E  Not Detected   CORONAVIRUS HKU1  Not Detected   CORONAVIRUS NL63  Not Detected   CORONAVIRUS OC43  Not Detected   HUMAN METAPNEUMOVIRUS  Not Detected   HUMAN RHINOVIRUS/ENTEROVIRUS  Not Detected   INFLUENZA B PCR  Not Detected   PARAINFLUENZA 1  Not Detected   PARAINFLUENZA VIRUS 2  Not Detected   PARAINFLUENZA VIRUS 3  Not Detected   PARAINFLUENZA VIRUS 4  Not Detected   BORDETELLA PERTUSSIS PCR  Not Detected   CHLAMYDOPHILA PNEUMONIAE PCR  Not Detected   MYCOPLAMA PNEUMO PCR  Not Detected   INFLUENZA A PCR  Not Detected   INFLUENZA A H3  Not Detected   INFLUENZA A H1  Not Detected   RSV, PCR  Not Detected           Imaging:   Imaging Results (all)     Procedure Component Value Units Date/Time    XR Chest 1 View [[id number]] Collected:  06/28/19 1551     Updated:  06/28/19 1636    Narrative:       PORTABLE RADIOGRAPHIC VIEW OF THE CHEST     CLINICAL HISTORY: Endotracheal tube placement.     FINDINGS: The distal tip of the endotracheal tube terminates  approximately 4 cm above the level of the bowen. The lungs are clear of  acute infiltrates. The previously identified patchy airspace abnormality  within the right lung base is no longer identified. Osseous structures  are unremarkable. Sternal wires are evident. A feeding tube is in place  with its distal tip not visualized on the radiograph. Postsurgical  changes are incidentally noted within the visualized upper lumbar spine.  Arthritic changes are noted within both shoulders and there are findings  suggestive of a chronic rotator cuff injury on the left. A chronic left  humeral fracture is incidentally noted.       Impression:          No active disease in the chest.     Life support apparatus as discussed in detail above.     Status post median sternotomy.      Postsurgical changes incidentally noted within the visualized upper  lumbar spine.     This report was finalized on 6/28/2019 4:33 PM by Dr. Jose Miguel Augustin M.D.             I reviewed the patient's new clinical results.  I personally viewed and interpreted the patient's imaging results: Well-expanded lungs bilaterally with hyperinflation, ET tube in good position      Medication Review:     acetylcysteine 4 mL Nebulization BID - RT   albuterol sulfate HFA 6 puff Inhalation Q4H - RT   busPIRone 10 mg Nasogastric Q12H   carvedilol 6.25 mg Oral BID   ceftriaxone 1 g Intravenous Q24H   chlorhexidine 15 mL Mouth/Throat Q12H   enoxaparin 30 mg Subcutaneous Q24H   insulin lispro 0-24 Units Subcutaneous Q6H   lansoprazole 30 mg Nasogastric QAM   predniSONE 20 mg Nasogastric TID With Meals   sennosides-docusate sodium 2 tablet Oral Nightly   sertraline 50 mg Oral Daily   sodium chloride 3 mL Intravenous Q12H         dexmedetomidine 0.2-1.5 mcg/kg/hr Last Rate: 1.2 mcg/kg/hr (07/01/19 0534)   propofol 5-50 mcg/kg/min Last Rate: 40 mcg/kg/min (07/01/19 0603)   sodium chloride 125 mL/hr Last Rate: 125 mL/hr (06/30/19 2116)       ASSESSMENT:   1. Acute respiratory failure  2. Chronic hypercapnic respiratory  3. Mucous plug status post intubation, with complete left lung atelectasis status post bronchoscopy with mucous retrieval  4. Steroid-induced hyperglycemia  5. Acute exacerbation of COPD  6. Metabolic encephalopathy  7. Hypomagnesemia and hypokalemia status post correction    PLAN:  Detailed discussion with the family, the 3 daughters at the bedside, including the POA one,  had all came to the agreement that their mom did not want to live like this and she would rather be kept comfortable rather than maintain on life support measures especially that her quality of life even before the acute illness was very poor.  We did discuss what comfort measures include, the goal of treating mainly the signs of distress or discomfort  and the plan to discontinue any measures to prolong her illness like antibiotic and steroids etc.  They are all in agreement that she needs to come off the ventilator and be kept comfortable and we will proceed with that where she is and extubate to comfort measures only      Discussed with nursing staff,  with the family  at the bedside    Disposition: We will transfer to the palliative care unit depending on her life expectancy post extubation    Mckenzie Castrejon MD  07/01/19  9:40 AM        Dictated utilizing Dragon dictation

## 2019-07-01 NOTE — NURSING NOTE
CWOCN consult- patient has a bruise on her cheek. It was under the ET tube stabilizer and above the edge of that dressing. The edges almost look as if there is friction since the bruising was also above the dressing. Patient is edematous as well- there is likely a trauma or possible pressure component. Patient in bed with family around her praying and tearful. I did not interrupt. Discussed with RN- patient with fragile edematous skin. Recommend to moisturize as needed to keep skin from cracking. Also, patient has a small open wound on her buttock and a rectal prolapse. Recommend barrier ointment and to be gentle with transfers so that the prolapse does not rub on the sheets so much.

## 2019-07-02 PROBLEM — Z51.5 ADMISSION FOR HOSPICE CARE: Status: ACTIVE | Noted: 2019-01-01

## 2019-07-02 NOTE — PROGRESS NOTES
Palliative Care Sw met with patient's daughters are bedside. Patient appears comfortable and daughters are at peace with patient nearing end of life. Per daughters, patient's  visited yesterday and anointed patient. Daughters inquired about  arrangements. Daughters plan on contacting  home as they want to arrange services as soon as possible after patient passes. Provided psychosocial support and will continue to follow as needed.

## 2019-07-02 NOTE — PROGRESS NOTES
Discharge Planning Assessment  [facility]     Patient Name: Maryjo Wynn  MRN: [id number]  Today's Date: 7/2/2019    Admit Date: 6/24/2019    Discharge Needs Assessment    No documentation.       Discharge Plan     Row Name 07/02/19 1103       Plan    Plan Comments  The [facility] [place] [facility] [place] [facility] [place] 7/1/19 @ 12:35. The patient is palliative. [facility] to evaluate the patient and meet with the family today. GABE Blanton RN, CCP.         Destination      Service Provider Request Status Selected Services Address Phone Number Fax Number    [facility] Pending - Request Sent N/A [address] [phone]    [facility] Pending - Request Sent N/A [address], [place] [phone]       Katherine Nixon, RN 6/27/2019 0922    Spoke to Keisha in admissions at [place]                 [facility] Pending - Request Sent N/A [address], [facility] [id number] [phone]       Katherine Nixon, RN 6/27/2019 0919    Will notify [facility] of referral                 Durable Medical Equipment      No service coordination in this encounter.      Dialysis/Infusion      No service coordination in this encounter.      Home Medical Care      No service coordination in this encounter.      Therapy      No service coordination in this encounter.      Community Resources      No service coordination in this encounter.          Demographic Summary    No documentation.       Functional Status    No documentation.       Psychosocial    No documentation.       Abuse/Neglect    No documentation.       Legal    No documentation.       Substance Abuse    No documentation.       Patient Forms    No documentation.           Maryjo Blanton, RN

## 2019-07-02 NOTE — CONSULTS
"Pt unresponsive. (2) daughters and (2) others in room. Daughters related that they are at peace with everything. The pt has been annointed and they prayed over her with clergy. Informed those present that chaplains are available through their nurse if needed. Everyone in the room seemed to be at peace, even joyful in the moment.     Will follow up utilizing the [employer] Spiritual History Tool as/if appropriate to assist in Pastoral Care assessment. The [employer] Spiritual History Tool ©™ serves as a guide for conversations in the clinical setting and can help structure questions in taking a spiritual history by healthcare professionals.    Record of responses with the pt's daughters    F - Richa and Belief  \"Do you consider yourself spiritual or Catholic?\"   \"Is spirituality something important to you?”   “Do you have spiritual beliefs that help you cope with stress/ difficult times?\"  The sisters communicated that they reached out to their clergy and richa community as a natural response in time of concern.    If the patient responds \"No,\" the health care provider might ask, \"What gives your life meaning?\" Sometimes patients respond with answers such as family, career, or nature.  (The question of meaning should also be asked even if people answer yes to spirituality)     I - Importance  \"What importance does your spirituality have in our life?\" The sisters communicated that richa was very important to them and the pt.    \"Has your spirituality influenced how you take care of yourself, your health?\"   \"Does your spirituality influence you in your healthcare decision making? (e.g. advance directives, treatment, etc.)\"      C - Community - \"Are you part of a spiritual community? Yes    Communities such as churches, temples, and mosques, or a group of like-minded friends, family, or yoga can serve as strong support systems for some patients.  Can explore further: Is this of support to you and how? Is there a group of people " "you really love or who are important to you?\"    A - Address in Care - \"How would you like me, your healthcare provider, to address these issues in your healthcare?\"  The daughters communicated that they were at peace, felt their spiritual needs at this moment have been met. I encouraged them to reach out to a  through their nurse. Reminded them that some who may join them might not be as prepared for their mother's condition; chaplains are available for them too.    (With the newer models including diagnosis of spiritual distress A also refers to the \"Assessment and Plan\" of patient spiritual distress or issues within a treatment or care plan.)    © Copyright, Cristina Rolle MD, 1996  Citation: CLAIRE Rolle, & Tyrone, AGwen MCGEE. (2000). Taking a spiritual history allows clinicians to understand patients more fully. Journal of Palliative Medicine, 3(1) 129-137.    As with any other part of the patient interview, the spiritual histories should be patient-centered. Thus, the tool is meant to create an environment of trust by indicating to the patient that the physician or other healthcare professional is open to listening to the patient about his or her spiritual issues, if the patient wants to talk about those issues. There are ethical guidelines to which the physician or healthcare provider should adhere when taking a spiritual history. Healthcare professionals are encouraged not to use the [employer] tool as a checklist, but rather to rely on it as a guide to aid and open the discussion to spiritual issues. See more recommendations for taking a spiritual history.  "

## 2019-07-02 NOTE — DISCHARGE SUMMARY
DISCHARGE SUMMARY    Patient Name: Maryjo Wynn  Age/Sex: 74 y.o. female  : 1945  MRN: [id number]  Patient Care Team:  Rashi Ogden MD as PCP - General (Family Medicine)  Linda Conde MD as Consulting Physician (Obstetrics and Gynecology)  Frank Staton MD as Consulting Physician (Pulmonary Disease)  Ryan Diaz MD as Consulting Physician (Interventional Cardiology)       Date of Admit: 2019  Date of Discharge:  19  Discharge Condition: Serious    Discharge Diagnoses:  1. Acute respiratory failure  2. Chronic hypercapnic respiratory  3. Mucous plug status post intubation, with complete left lung atelectasis status post bronchoscopy with mucous retrieval  4. Steroid-induced hyperglycemia  5. Acute exacerbation of COPD  6. Metabolic encephalopathy  7. Hypomagnesemia and hypokalemia status post correction    History of present illness from H&P from 2019: Per Dr. David Mendieta's note  Mrs. Wynn is a 75yo WF with a history of severe COPD and chronic hypoxemic and hypercapnic respiratory failure.  She uses a trilogy machine at home. She was recently in the hospital for a hip dislocation.  She was prescribed narcotics and benzos on dischage which she has been taking at home.  She has not been using her trilogy at home as it has not been working properly.  She became gradually more somnolent and confused at home.  She was hard to arouse.  She has had similar episodes when she has had incrased c02 retention.  She was brought to the er and found to have 7.28/109  She was placed on bipap but she remained somnlent and wasn't improving.  She was intubated.  She is now sedated on vent.        Hospital Course:   Maryjo Wynn presented to [facility] with complaints of shortness of breath, had to be intubated and started on mechanical ventilator.  The patient has pretty advanced end-stage lung disease, she was having difficulty coming off the  ventilator, she did have a mucous plug on initial presentation however despite bronchoscopy and cleaning of all the excessive secretion she was still having difficulty tolerating CPAP.  The patient overall quality of life was very poor and her overall status was rapidly declining over the last several months with multiple hospitalization.  The daughter who is the POA along with the other siblings all were in agreement that their mother does not want to live like this and they wanted her to be comfortable and go for palliative measures.  The patient was extubated and was started on the palliative care order, she is currently in the palliative care unit and the patient will be discharged and treated as a scattered bed.  Anticipated life expectancy is very short, and a matter of days.    Consults:   IP CONSULT TO PULMONOLOGY  IP CONSULT TO CASE MANAGEMENT   IP CONSULT TO NUTRITION SERVICES  IP CONSULT TO HOSPICE    Significant Discharge Diagnostics   Procedures Performed:         Pertinent Lab Results:  Results from last 7 days   Lab Units 06/30/19  0632 06/29/19  0648 06/28/19  0900 06/27/19 2035 06/27/19  0431 06/26/19  0430   SODIUM mmol/L 144  --   --   --  137 138   POTASSIUM mmol/L 3.8 4.0 4.3 4.6 2.8* 3.9   CHLORIDE mmol/L 105  --   --   --  91* 91*   CO2 mmol/L 32.0*  --   --   --  31.3* 31.1*   BUN mg/dL 6*  --   --   --  7* 7*   CREATININE mg/dL <0.17*  --   --   --  0.18* 0.29*   GLUCOSE mg/dL 143*  --   --   --  104* 84   CALCIUM mg/dL 8.2*  --   --   --  8.6 8.0*         Results from last 7 days   Lab Units 06/27/19 0431 06/26/19  0430   WBC 10*3/mm3 14.44* 14.53*   HEMOGLOBIN g/dL 11.1* 11.5*   HEMATOCRIT % 35.7 38.0   PLATELETS 10*3/mm3 326 354   MCV fL 98.1* 101.1*   MCH pg 30.5 30.6   MCHC g/dL 31.1* 30.3*   RDW % 15.4 15.7*   RDW-SD fl 54.8* 58.2*   MPV fL 11.4 11.4         Results from last 7 days   Lab Units 06/30/19  0632 06/29/19  0648 06/28/19  0900 06/26/19  0430   MAGNESIUM  mg/dL 1.8 2.0 1.3* 1.8     Results from last 7 days   Lab Units 06/26/19  1153   TRIGLYCERIDES mg/dL 265*             Results from last 7 days   Lab Units 06/27/19  1708 06/27/19  0727   PH, ARTERIAL pH units 7.409 7.393   PCO2, ARTERIAL mm Hg 55.4* 57.6*   PO2 ART mm Hg 166.2* 63.0*   HCO3 ART mmol/L 35.1* 35.1*                 Results from last 7 days   Lab Units 06/27/19  1621   RESPCX  Moderate growth (3+) Pseudomonas species*  Light growth (2+) Normal Respiratory Ally     Results from last 7 days   Lab Units 06/27/19  1422   NITRITE UA  Negative               Imaging Results:  Imaging Results (all)     Procedure Component Value Units Date/Time    XR Chest 1 View [[id number]] Collected:  07/01/19 0454     Updated:  07/01/19 0458    Narrative:       PORTABLE CHEST X-RAY     CLINICAL HISTORY: resp failure; J96.02-Acute respiratory failure with  hypercapnia; J96.11-Chronic respiratory failure with hypoxia;  J44.9-Chronic obstructive pulmonary disease, unspecified;  I48.0-Paroxysmal atrial fibrillation; R13.12-Dysphagia, oropharyngeal  phase     COMPARISON: 06/28/2019.     FINDINGS: Portable AP view of the chest was obtained with overlying  monitor leads in place. Life support lines are unchanged. The lungs are  well inflated. No active airspace disease, edema, or significant pleural  fluid. Normal heart size. Vascular calculi. Prior CABG.             Impression:          Stable appearance of the chest by portable radiography.        This report was finalized on 7/1/2019 4:55 AM by Williams Chow M.D.       XR Chest 1 View [[id number]] Collected:  06/28/19 1551     Updated:  06/28/19 1636    Narrative:       PORTABLE RADIOGRAPHIC VIEW OF THE CHEST     CLINICAL HISTORY: Endotracheal tube placement.     FINDINGS: The distal tip of the endotracheal tube terminates  approximately 4 cm above the level of the bowen. The lungs are clear of  acute infiltrates. The previously identified patchy airspace abnormality  within  the right lung base is no longer identified. Osseous structures  are unremarkable. Sternal wires are evident. A feeding tube is in place  with its distal tip not visualized on the radiograph. Postsurgical  changes are incidentally noted within the visualized upper lumbar spine.  Arthritic changes are noted within both shoulders and there are findings  suggestive of a chronic rotator cuff injury on the left. A chronic left  humeral fracture is incidentally noted.       Impression:          No active disease in the chest.     Life support apparatus as discussed in detail above.     Status post median sternotomy.     Postsurgical changes incidentally noted within the visualized upper  lumbar spine.     This report was finalized on 6/28/2019 4:33 PM by Dr. Jose Miguel Augustin M.D.       XR Chest 1 View [[id number]] Collected:  06/27/19 1739     Updated:  06/27/19 2056    Narrative:       STAT PORTABLE VIEW OF THE CHEST 06/27/2019     CLINICAL HISTORY: Status post endotracheal tube placement.     COMPARISON: This is correlated to chest x-ray 06/24/2019.     FINDINGS: Dobbhoff courses down the esophagus into the stomach. Its  distal tip is excluded from the field of imaging and not evaluated but  it clearly courses into the stomach. There has been placement of an  endotracheal tube. The tip of the endotracheal tube projects over the  thoracic tracheal air column approximately 4 cm above the bowen in good  position. There are multiple sternal wires and mediastinal markers from  previous cardiac surgery. Cardiomediastinal silhouette and pulmonary  vasculature are within normal limits. There is some minimal patchy  opacity at the right lung base likely atelectasis. Minimal pneumonia is  impossible to exclude. The remainder of the lungs are clear. The  costophrenic angles are sharp.       Impression:       Status post intubation with the tip of the endotracheal tube  approximately 4 cm above the bwoen with good position and there  has  been placement of a Dobbhoff that courses down the esophagus into the  stomach and its distal tip is excluded from the radiograph and not  evaluated. There is some minimal patchy airspace opacity at the right  lung base that favors atelectasis over pneumonia but correlate  clinically. No additional active disease is seen in chest. Incidental  note is made of an old healed left humeral neck fracture, previous  median sternotomy likely for CABG.      The results were communicated to the nurse in the ICU taking care of the  patient by telephone 06/27/2019 at 4:10 PM     This report was finalized on 6/27/2019 8:53 PM by Dr. Jose David Kirkland M.D.       XR Abdomen KUB [[id number]] Collected:  06/25/19 1326     Updated:  06/26/19 0833    Narrative:       STAT KUB 06/25/2019     CLINICAL HISTORY: CORTRAK placement.     FINDINGS: Single portable view extending from the upper chest to the  upper abdomen and submitted for interpretation. Dobbhoff courses down  the trachea, the right mainstem bronchus, the bronchus intermedius and  the tip of the Dobbhoff projects over the right lung base. The Dobbhoff  should be removed and replaced and followup views could be obtained  after replacing the Dobbhoff. The findings were immediately communicated  to Елена the nurse in ICU taking care of the patient by telephone  06/25/2019 at 12:50 PM. She informed me that the Dobbhoff has already  been removed and replaced.     This report was finalized on 6/26/2019 8:29 AM by Dr. Jose David Kirkland M.D.       XR Chest 1 View [[id number]] Collected:  06/24/19 1820     Updated:  06/24/19 1902    Narrative:       ONE VIEW PORTABLE CHEST AT 5:57 PM     HISTORY: ET tube placement. Shortness of breath.     FINDINGS: There has been recent insertion of an ET tube which ends in  good position 3 cm above the bowen. Again noted is prominent  hyperinflation and the lungs remain grossly clear. The heart is  borderline enlarged with sternal wires from previous  cardiac surgery.     There are extremely severe arthritic changes at both shoulders with a  component of chronic superior subluxation of the right humeral head  relative to the glenoid.     This report was finalized on 6/24/2019 6:59 PM by Dr. Sylvester Castaneda M.D.       XR Chest 1 View [[id number]] Collected:  06/24/19 1645     Updated:  06/24/19 1701    Narrative:       PORTABLE RADIOGRAPHIC VIEW OF THE CHEST     CLINICAL HISTORY: Shortness of air.     FINDINGS: Portable radiographic view of the chest demonstrates pulmonary  vascular engorgement. There is no significant interstitial pulmonary  edema. Sternal wires are identified. Cardiomediastinal silhouette is  within normal limits. Osseous structures are remarkable for an  age-indeterminate fracture involving the proximal portion of the left  humerus. There is a chronic appearing deformity involving the right  humeral head and prominent arthritic changes are noted within the right  glenohumeral joint.     This report was finalized on 6/24/2019 4:58 PM by Dr. Jose Miguel Augustin M.D.             Objective:   Temp:  [96.9 °F (36.1 °C)-97 °F (36.1 °C)] 96.9 °F (36.1 °C)  Heart Rate:  [69-98] 69  Resp:  [2-14] 3  BP: ()/(61-66) 88/61   SpO2:  [88 %-89 %] 88 %  on  Flow (L/min):  [4] 4 Device (Oxygen Therapy): nasal cannula    Intake/Output Summary (Last 24 hours) at 7/2/2019 1419  Last data filed at 7/1/2019 1740  Gross per 24 hour   Intake 0 ml   Output 400 ml   Net -400 ml     Body mass index is 23.64 kg/m².      06/29/19  0547 06/30/19  0034 07/01/19  0037   Weight: 48.7 kg (107 lb 5.8 oz) 51.4 kg (113 lb 5.1 oz) (weight times three taken ) 54.9 kg (121 lb 0.5 oz)     Weight change:     Physical Exam:      General: Patient is unresponsive, agonal breathing, but does not seem to be in pain.         HEENT:  No oral lesions. No thrush.  Dry mucous membranes   Chest Wall:  No abnormalities observed.             Neck:  Trachea midline. No JVD.   Pulmonary:  Very shallow  breathing, agonal positive wheeze.             Abdominal:  Soft, non-tender and non-distended. Normal bowel sounds. No masses. No organomegaly. No guarding. No rebound tenderness.  Extremities:  Minimal spontaneous movement, positive edema.     Discharge Medications and Instructions:     Discharge Medications     Discharge Medications      ASK your doctor about these medications      Instructions Start Date   albuterol sulfate  (90 Base) MCG/ACT inhaler  Commonly known as:  PROVENTIL HFA;VENTOLIN HFA;PROAIR HFA   2 puffs, Inhalation, Every 4 Hours PRN      ALPRAZolam 0.5 MG tablet  Commonly known as:  XANAX   0.25 mg, Oral, 3 Times Daily PRN      aspirin 81 MG EC tablet   81 mg, Oral, Daily      atorvastatin 10 MG tablet  Commonly known as:  LIPITOR   10 mg, Oral, Every Evening      budesonide 0.5 MG/2ML nebulizer solution  Commonly known as:  PULMICORT   0.5 mg, Inhalation, 2 Times Daily      busPIRone 10 MG tablet  Commonly known as:  BUSPAR   10 mg, Oral, 2 Times Daily      CARAFATE 1 GM/10ML suspension  Generic drug:  sucralfate   1 tablet, Oral, 4 Times Daily After Meals & at Bedtime      carvedilol 6.25 MG tablet  Commonly known as:  COREG   6.25 mg, Oral, 2 Times Daily      dicyclomine 20 MG tablet  Commonly known as:  BENTYL   20 mg, Oral, 3 Times Daily With Meals      Ferrous Sulfate 134 MG tablet   1 tablet, Oral, Daily      furosemide 40 MG tablet  Commonly known as:  LASIX   40 mg, Oral, Daily      guaiFENesin 600 MG 12 hr tablet  Commonly known as:  MUCINEX   1,200 mg, Oral, 2 Times Daily      HYDROcodone-acetaminophen 5-325 MG per tablet  Commonly known as:  NORCO   1 tablet, Oral, Every 4 Hours PRN      ipratropium-albuterol 0.5-2.5 mg/3 ml nebulizer  Commonly known as:  DUO-NEB   3 mL, Inhalation, 4 Times Daily PRN,        losartan 50 MG tablet  Commonly known as:  COZAAR   25 mg, Oral, Daily      multivitamin tablet tablet   1 tablet, Oral, Daily      NITROSTAT 0.4 MG SL tablet  Generic drug:   nitroglycerin   0.4 mg, Sublingual, Take As Directed      pantoprazole 40 MG EC tablet  Commonly known as:  PROTONIX   40 mg, Oral, Daily      potassium chloride 10 MEQ CR tablet  Commonly known as:  K-DUR   10 mEq, Oral, Daily      predniSONE 20 MG tablet  Commonly known as:  DELTASONE   20 mg, Oral, Daily, MAINTENCE DOSE.      roflumilast 500 MCG tablet tablet  Commonly known as:  DALIRESP   500 mcg, Oral, Daily      sertraline 50 MG tablet  Commonly known as:  ZOLOFT   50 mg, Oral, Daily      TRELEGY ELLIPTA 100-62.5-25 MCG/INH aerosol powder   Generic drug:  Fluticasone-Umeclidin-Vilant   1 puff, Inhalation, Daily             Discharge Diet:    Dietary Orders (From admission, onward)    Start     Ordered    07/01/19 1010  Diet Regular  Diet Effective Now     Question:  Diet Texture / Consistency  Answer:  Regular    07/01/19 1009          Activity at Discharge:   Patient is bedridden    Discharge disposition: Scattered hospice bed    Discharge Instructions and Follow ups:  Patient is bedridden  Follow-up Information     Rashi Ogden MD .    Specialty:  Family Medicine  Contact information:  [address] [place] [address] [phone]                 No future appointments.     Medication Reconciliation: Please see electronically completed Med Rec.    Total time spent discharging patient including evaluation, medication reconciliation, arranging follow up, and post hospitalization instructions and education total time exceeds 30 minutes.     Mckenzie Castrejon MD  07/02/19  2:19 PM      Dictated utilizing Dragon dictation

## 2019-07-02 NOTE — PLAN OF CARE
Problem: Patient Care Overview  Goal: Plan of Care Review  Outcome: Ongoing (interventions implemented as appropriate)   07/02/19 0418   Coping/Psychosocial   Plan of Care Reviewed With patient   Plan of Care Review   Progress declining   OTHER   Outcome Summary Pt was premedicated prior to turns q4 hours, with 4 mg of morphine. tolerated well. pt is very apenic. told family about the changes, skin very fragile and pt is mottling, will continue to monitor and keep comfortable      Goal: Individualization and Mutuality  Outcome: Ongoing (interventions implemented as appropriate)   07/02/19 0418   Individualization   Patient Specific Goals (Include Timeframe) to keep comfortable    Patient Specific Interventions premedicate prior to turns, q4 turns       Problem: Fall Risk (Adult)  Goal: Identify Related Risk Factors and Signs and Symptoms  Outcome: Outcome(s) achieved Date Met: 07/02/19    Goal: Absence of Fall  Outcome: Ongoing (interventions implemented as appropriate)      Problem: Skin Injury Risk (Adult)  Goal: Identify Related Risk Factors and Signs and Symptoms  Outcome: Outcome(s) achieved Date Met: 07/02/19    Goal: Skin Health and Integrity  Outcome: Ongoing (interventions implemented as appropriate)      Problem: Dying Patient, Actively (Adult)  Goal: Identify Related Risk Factors and Signs and Symptoms  Outcome: Outcome(s) achieved Date Met: 07/02/19    Goal: Comfort/Pain Control  Outcome: Ongoing (interventions implemented as appropriate)    Goal: Peace/Preservation of Dignity During the Dying Process  Outcome: Ongoing (interventions implemented as appropriate)

## 2019-07-02 NOTE — CONSULTS
Met with patient's daughter (Aydee) and spouse (Pat) at bedside. Explanation of services provided with focus on HSB. Answered all questions, discussed medications, code status, and goals of care.     [facility] and all consent forms signed via daughter/HCS, Aydee. Called BONNIE Lee, to request discharge/readmit to scattered bed. She confirms Dr Castrejon will enter order today.    Please call with any questions, concerns, or change in patient status.    Thank you for allowing us to participate in the care of this patient and family.    Elvria Oliver, RN  Admission Nurse  [facility] Nurse  [phone]

## 2019-07-02 NOTE — PLAN OF CARE
Problem: Fall Risk (Adult)  Goal: Absence of Fall  Outcome: Ongoing (interventions implemented as appropriate)      Problem: Dying Patient, Actively (Adult)  Goal: Comfort/Pain Control  Outcome: Ongoing (interventions implemented as appropriate)    Goal: Peace/Preservation of Dignity During the Dying Process  Outcome: Ongoing (interventions implemented as appropriate)      Problem: Skin Injury Risk (Adult)  Goal: Skin Health and Integrity  Outcome: Ongoing (interventions implemented as appropriate)      Problem: Patient Care Overview  Goal: Plan of Care Review  Outcome: Ongoing (interventions implemented as appropriate)   07/02/19 1826   Coping/Psychosocial   Plan of Care Reviewed With patient;family   Plan of Care Review   Progress declining   OTHER   Outcome Summary Pt appears calm and comfortable today between care, premedicated for turns with 4mg morphine and 0.4mg robinul, scop patch placed and oral suction PRN for congestion. Family at bedside throughout the day, aware of patients decline. Answered questions and discussed palliative care at length. Will continue comfort care.      Goal: Individualization and Mutuality  Outcome: Ongoing (interventions implemented as appropriate)   07/02/19 1826   Individualization   Patient Specific Goals (Include Timeframe) comfort   Patient Specific Interventions premedicate for turns and as needed for symptom management

## 2019-07-02 NOTE — PROGRESS NOTES
Case Management Discharge Note    Final Note: Admitted to a [facility] scattered bed on 7/2/19. GABE Blanton RN, CCP    Destination - Selection Complete      Service Provider Request Status Selected Services Address Phone Number Fax Number    [facility] Selected Inpatient Hospice [address], [facility] [id number] [phone]      Durable Medical Equipment      No service has been selected for the patient.      Dialysis/Infusion      No service has been selected for the patient.      Home Medical Care      No service has been selected for the patient.      Therapy      No service has been selected for the patient.      Community Resources      No service has been selected for the patient.             Final Discharge Disposition Code: 51 - hospice medical facility

## 2019-07-03 NOTE — PROGRESS NOTES
Case Management Discharge Note    Final Note: The patient  on 7/3/19 @ 03:19. GABE Blanton Rn, CCP    Destination - Selection Complete      Service Provider Request Status Selected Services Address Phone Number Fax Number    [facility] [id number] [facility] [id number] [phone]      Durable Medical Equipment      No service has been selected for the patient.      Dialysis/Infusion      No service has been selected for the patient.      Home Medical Care      No service has been selected for the patient.      Therapy      No service has been selected for the patient.      Community Resources      No service has been selected for the patient.             Final Discharge Disposition Code: 41 -  in medical facility

## 2019-07-08 LAB
BACTERIA SPEC RESP CULT: ABNORMAL
BACTERIA SPEC RESP CULT: ABNORMAL
GRAM STN SPEC: ABNORMAL

## 2019-07-28 NOTE — DISCHARGE SUMMARY
Patient was just discharged/[facility] few hours before the patient   The discharge summary dated 2018 was last few hours before the patient  and can be used as a  summary as well

## 2021-05-28 VITALS
DIASTOLIC BLOOD PRESSURE: 62 MMHG | OXYGEN SATURATION: 87 % | TEMPERATURE: 97.8 F | BODY MASS INDEX: 23.81 KG/M2 | HEART RATE: 78 BPM | SYSTOLIC BLOOD PRESSURE: 140 MMHG | HEIGHT: 60 IN | RESPIRATION RATE: 20 BRPM | WEIGHT: 121.25 LBS

## 2021-05-28 NOTE — PROGRESS NOTES
"Patient: PADDY FISHER     Acct: [id number]     Report: #[id number]  UNIT #: [id number]     : 1945    Encounter Date:10/10/2018  PRIMARY CARE: JOSÉ LUIS BAXTER  ***Signed***  --------------------------------------------------------------------------------------------------------------------  Chief Complaint      Encounter Date      Oct 10, 2018            Primary Care Provider      JOSÉ LUIS BAXTER            Referring Provider      JOSÉ LUIS BAXTER            Patient Complaint      Patient is complaining of      New pt here for COPD            VITALS      Height 5 ft 0 in / 152.4 cm      Weight 121 lbs 4 oz / 54.119234 kg      BSA 1.51 m2      BMI 23.7 kg/m2      Temperature 97.8 F / 36.56 C - Temporal      Pulse 78      Respirations 20      Blood Pressure 140/62 Sitting, Left Arm      Pulse Oximetry 87%, Nasal cannula, 2 lpm            HPI      The patient is a very pleasant  73 year old female with chronic obstructive     pulmonary disease and chronic hypoxemic respiratory failure. She was referred by    her primary care provider Dr. José Luis Baxter. She is here today with her . Her    chief complaint is \" help me breath.\"            The patient states that she has been hospitalized more than she has been at home    since 2018. She has been hospitalized in January, February, March and     April and most recently last week. Each time she is told it is a chronic     obstructive pulmonary disease exacerbation.  She continues to have chronic     wheeze and shortness of breath with minimal activity. She is on 2 liters of     oxygen at all times. When she came into the office her oxygen saturation was 78.    We had her sit down, take some deep breaths through the nose and it increased to    87% on 2 liters. She is unaware if it drops this low at home because she does     not regularly check it. She is short of breath day and night and is limited in     her ability to do her day to day activities such as clean the " "house or grocery     shop secondary to her breathlessness. She has a portable oxygen concentrator and    this has helped her to remain active as much as possible but with all her     hospitalizations she has been become weak. She admits to having anxiety that has    worsened over the past several months. She goes into \"panic attacks\" and her     primary care provider started her on Zoloft and Buspirone. She is also on     Daliresp 500 mcg once daily and about a month ago was transitioned from     Symbicort and Spiriva over to trelegy ellipta. She is unsure if it is helping     her. She uses albuterol ipratropium nebulizers twice daily and her rescue     inhaler approximately twice daily.             Review of Systems as noted.             PAST MEDICAL HISTORY:      1. Coronary artery disease status post coronary artery bypass graft.       2. Chronic obstructive pulmonary disease.       3. Osteoarthritis.       4. Chronic anemia.       5. Anxiety.       6. Hypertension.             PAST SURGICAL HISTORY:      1. Appendectomy.       2. Coronary artery bypass graft.       3. Hip, back and wrist surgeries.       4. Tonsillectomy.      5. FEM/FEM aorta bypass.            FAMILY HISTORY:      Father with history of heart disease. She has 3 daughters and 8 grandchildren,     all are healthy.             SOCIAL HISTORY:      She is a former smoker of 1 pack per day for 30 years, she quit in 2015. She is     retired from being a . She is  for the past 8 years with    her .             Medications were reviewed by myself with the patient and updated in the chart.            ROS      Constitutional:  Complains of: Fatigue; Denies: Fever, Weight gain, Weight loss,    Chills, Insomnia, Other      Respiratory/Breathing:  Complains of: Shortness of air, Wheezing; Denies: Cough,    Hemoptysis, Pleuritic pain, Other      Endocrine:  Denies: Polydipsia, Polyuria, Heat/cold intolerance, Abnorml   "   menstrual pattern, Diabetes, Other      Eyes:  Denies: Blurred vision, Vision Changes, Other      Ears, nose, mouth, throat:  Denies: Congestion, Dysphagia, Hearing Changes, Nose    Bleeding, Nasal Discharge, Throat pain, Tinnitus, Other      Cardiovascular:  Denies: Chest Pain, Exertional dyspnea, Peripheral Edema,     Palpitations, Syncope, Wake up Gasping for air, Orthopnea, Tachycardia, Other      Gastrointestinal:  Denies: Abdominal pain/cramping, Bloody stools, Constipation,    Diarrhea, Melena, Nausea, Vomiting, Other      Genitourinary:  Denies: Dysuria, Urinary frequency, Incontinence, Hematuria,     Urgency, Other      Musculoskeletal:  Denies: Joint Pain, Joint Stiffness, Joint Swelling, Myalgias,    Other      Hematologic/lymphatic:  DENIES: Lymphadenopathy, Bruising, Bleeding tendencies,     Other      Neurologic:  Denies: Headache, Numbness, Weakness, Seizures, Other      Psychiatric:  Complains of: Anxiety; Denies: Appropriate Effect, Depression,     Other      Sleep:  No: Excessive daytime sleep, Morning Headache?, Snoring, Insomnia?, Stop    breathing at sleep?, Other      Integumentary:  Denies: Rash, Dry skin, Skin Warm to Touch, Other            FAMILY/SOCIAL/MEDICAL HX      Surgical History:  Yes: Appendectomy, CABG, Orthopedic Surgery (HIP, back,     wrist), Tonsils, Vascular Surgery (stents in legs for vascular dz)      Heart - Family Hx:  Father      Is Father Still Living?:  No      Is Mother Still Living?:  No      Smoking status:  Former smoker (1ppd x 30 years, quit 2015)      Anticoagulation Therapy:  No      Antibiotic Prophylaxis:  No      Medical History:  Yes: Anemia, Arthritis, Chronic Bronchitis/COPD, Anxiety, High    Blood Pressure      Psychiatric History      Anxiety            PREVENTION      Hx Influenza Vaccination:  Yes      Date Influenza Vaccine Given:  Oct 1, 2018      Influenza Vaccine Declined:  No      2 or More Falls Past Year?:  No      Fall Past Year with Injury?:   No      Hx Pneumococcal Vaccination:  Yes      Encouraged to follow-up with:  PCP regarding preventative exams.      Chart initiated by      Chanel Cadena MA            ALLERGIES/MEDICATIONS      Allergies:        Coded Allergies:             Codeine (Verified  Allergy, Intermediate, 10/10/18)      Medications    Last Reconciled on 10/10/18 10:43 by DO Anni FOOTE-Budesonide (Pulmicort) 0.5 Mg/2 Ml Ampul.neb      0.5 MG INH BID, #60 NEB 12 Refills         Prov: HAYDEN MARTINES         10/10/18       Arformoterol Tartrate (Brovana) 15 Mcg/2 Ml Vial.neb      15 MCG INH RTQ12H, #60 NEB 12 Refills         Prov: HAYDEN MARTINES         10/10/18       Azithromycin (Azithromycin) 250 Mg Tablet      250 MG PO Q24HR, #90 TAB 3 Refills         Prov: HAYDEN MARTINES         10/10/18       Lactobacillus Rhamnosus  (Probiotic Digestive Care) 1 Each Capsule      1 EACH PO, CAP         Reported         10/10/18       predniSONE* (predniSONE*) 20 Mg Tablet      20 MG PO QDAY, TAB 0 Refills         Reported         10/10/18       Potassium Chloride (K-Dur*) 10 Meq Tab.er.prt      10 MEQ PO QDAY, #30 TAB 0 Refills         Reported         10/10/18       Pantoprazole (Protonix*) 40 Mg Tablet.dr      40 MG PO BIDAC, #60 TAB 0 Refills         Reported         10/10/18       Nitroglycerin (Nitroglycerin) 0.4 Mg Tab.subl      0.4 MG SL ONCE, TAB         Reported         10/10/18       Multivitamin (Multivitamins) 1 Each Capsule      1 EACH PO QDAY, CAP         Reported         10/10/18       Losartan Potassium (Losartan*) 25 Mg Tablet      25 MG PO QDAY, #30 TAB 0 Refills         Reported         10/10/18       Furosemide* (Lasix*) 40 Mg Tablet      40 MG PO QDAY, #30 TAB 0 Refills         Reported         10/10/18       Roflumilast (Daliresp) 500 Mcg Tab      500 MCG PO QDAY for 30 Days, #30 TAB         Reported         10/10/18       Cetirizine HCl (Cetirizine HCl*) 10 Mg Tablet      10 MG PO QDAY, #30 TAB 0 Refills          Reported         10/10/18       Carvedilol (Carvedilol) 6.25 Mg Tablet      6.25 MG PO BID, #60 TAB 0 Refills         Reported         10/10/18       Sucralfate (Carafate) 1 Gm Tab      1 GM PO QID, #60 TAB 0 Refills         Reported         10/10/18       Calcium Carbonate/Vitamin D3 (Calcium 500+D Tablet) 1 Each Tablet      1 EACH PO QDAY, TABLET         Reported         10/10/18       busPIRone HCl (busPIRone HCl) 10 Mg Tablet      10 MG PO BID, #60 TAB         Reported         10/10/18       Atorvastatin (Atorvastatin) 10 Mg Tablet      10 MG PO HS, #30 TAB         Reported         10/10/18       Albuterol (Proair HFA*) 8.5 Gm Inh      2 PUFFS INH RTQ6H, #1 INH 0 Refills         Reported         10/10/18      Current Medications      Current Medications Reviewed 10/10/18            EXAM      Vital signs reviewed.      GENERAL:  The patient appears her stated age, she is dyspneic with conversation,    somewhat tripoding, unable to complete full sentences.  She has a moon facies      HEENT: Pupils are equally round and reactive to light and accommodation.      Extraocular muscles intact bilateral. Nares patent without hypertrophy of the     turbinates.  Small oropharynx without lesions or erythema, dentures in place.        NECK:  Supple without tracheal deviation or lymphadenopathy. No thyromegaly     appreciated.       LYMPHATICS: No cervical or supraclavicular lymphadenopathy.       RESPIRATORY: Expiratory wheezes, worse in the upper lung fields and diminished     breath sounds in the lower lung fields,  no  rales or rhonchi, tympanic to     percussion. Increased AP diameter of the chest.       CVS: Distant heart tones, regular rate and rhythm, no murmurs, rubs or gallops,     no lower extremity edema,  equal radial pulses.      GI: Abdomen soft, nontender, nondistended, no hepatomegaly appreciated.  Bowel     sounds present in all 4 quadrants.       MUSCULOSKELETAL: No erythema, warmth or fluctuance over the  major joints     including the knees, ankles, wrists and elbows.        SKIN: No rashes or lesions. She has chronic bruising and venous stasis changes     in bilateral lower extremities.       NEUROLOGICAL: Alert and oriented X 3.  No focal deficits on exam. Cranial nerves    II-XII intact bilaterally.       PSYCH: Patient has appropriate mood and affect.      Vitals      Vitals:             Height 5 ft 0 in / 152.4 cm           Weight 121 lbs 4 oz / 54.354086 kg           BSA 1.51 m2           BMI 23.7 kg/m2           Temperature 97.8 F / 36.56 C - Temporal           Pulse 78           Respirations 20           Blood Pressure 140/62 Sitting, Left Arm           Pulse Oximetry 87%, Nasal cannula, 2 lpm            REVIEW      Results Reviewed      PCCS Results Reviewed?:  Yes Prev Radiology Results      Radiographic Results      I personally reviewed the patient's chest x-ray from 08/23/18. I reviewed     abdomen and pelvis CTA, specifically looking at the inferior part of the lungs.     There was noted to be mild bronchiectasis and consolidation within the lung     bases.            Assessment      COPD (chronic obstructive pulmonary disease) - J44.9            Chronic respiratory failure with hypoxia, on home O2 therapy - J96.11, Z99.81            Chronic recurrent bronchiolitis - J44.9            Notes      New Medications      * ALBUTEROL (Proair HFA*) 8.5 GM INH: 2 PUFFS INH RTQ6H #1      * Atorvastatin 10 MG TABLET: 10 MG PO HS #30      * busPIRone HCl 10 MG TABLET: 10 MG PO BID #60      * Calcium Carbonate/Vitamin D3 (Calcium 500+D Tablet) 1 EACH TABLET: 1 EACH PO       QDAY      * Sucralfate (Carafate) 1 GM TAB: 1 GM PO QID #60      * Carvedilol 6.25 MG TABLET: 6.25 MG PO BID #60      * Cetirizine HCl (Cetirizine HCl*) 10 MG TABLET: 10 MG PO QDAY #30      * ROFLUMILAST (Daliresp) 500 MCG TAB: 500 MCG PO QDAY 30 Days #30      * Furosemide* (Lasix*) 40 MG TABLET: 40 MG PO QDAY #30      * Losartan Potassium  (Losartan*) 25 MG TABLET: 25 MG PO QDAY #30      * Multivitamin (Multivitamins) 1 EACH CAPSULE: 1 EACH PO QDAY      * Nitroglycerin 0.4 MG TAB.SUBL: 0.4 MG SL ONCE         Instructions: DOSE= 1 TABLET SL EVERY 5 MINUTES PRN CHEST PAIN UP TO 3        TABLETS PER EPISODE      * PANTOPRAZOLE (Protonix*) 40 MG TABLET.DR: 40 MG PO BIDAC #60         Instructions: Take on an empty stomach.      * Potassium Chloride (K-Dur*) 10 MEQ TAB.ER.PRT: 10 MEQ PO QDAY #30      * predniSONE* 20 MG TABLET: 20 MG PO QDAY      * Lactobacillus Rhamnosus  (Probiotic Digestive Care) 1 EACH CAPSULE: 1       EACH PO      * Fluticasone/Umeclidin/Vilanter (Trelegy Ellipta 100-62.5-25) 1 EACH       BLST.W.DEV: 1 PUFF INH RTQDAY #3      * Azithromycin 250 MG TABLET: 250 MG PO Q24HR #90         Instructions: 250 mg once daily orally to continue.      * ARFORMOTEROL TARTRATE (Brovana) 15 MCG/2 ML VIAL.NEB: 15 MCG INH RTQ12H #60         Instructions: DIAGNOSIS CODE REQUIRED PRIOR TO PRESCRIBING.      * Neb-Budesonide (Pulmicort) 0.5 MG/2 ML AMPUL.NEB: 0.5 MG INH BID #60      * GLYCOPYRROL/NEBULIZER/ACCESSOR (Lonhala Magnair 25 Mcg Starter) 25 MCG/1 ML       VIAL.NEB: 25 MCG INH RTBID 30 Days #60      New Diagnostics      * 6 Min Walk With Pulse Ox, Routine         Dx: COPD (chronic obstructive pulmonary disease) - J44.9      * PFT-Comp, PrePost,DLCO,BodyBox, Week         Dx: COPD (chronic obstructive pulmonary disease) - J44.9      * Chest W/O Cont CT, Routine         Dx: Chronic respiratory failure with hypoxia, on home O2 therapy - J96.11,        Z99.81      New Office Procedures      * Prevnar 0.5 PCV13, As Soon As Possible         Pneumoc 13-Beulah Conj-Dip CRm/Pf (Prevnar 13 Syringe) 0.5 ML SYRINGE: 0.5        MILLILITER INTRAMUSCULARLY Qty 1 SYRINGE      * Fluzone Vaccine High-Dose, Routine         Flu Vacc [id number](65Yr Up)/Pf (Fluzone High-Dose 2018-19 Syringe) 180        MCG/0.5 ML SYRINGE: 180 MICROGRAM INTRAMUSCULARLY Qty 1 SYRINGE       New Referrals      * Pulmonary Rehab, Routine         Rehabilitation         Status changed from Active to Complete.         Dx: Chronic respiratory failure with hypoxia, on home O2 therapy - J96.11,        Z99.81      ASSESSMENT:      1. Chronic obstructive pulmonary disease unspecified severity but based on her     physical exam I presume  very severe chronic obstructive pulmonary disease.       2. Former tobacco user of cigarettes with 30 pack year smoking history in     remission.       3. Chronic hypoxemic respiratory failure currently needs optimization.       4. Recurrent bronchitis/bronchiolitis.       5. Bronchiectasis noted on imaging.       6. Peripheral vascular disease.      7. Hypertension.       8. Anxiety.       9. Coronary artery disease status post coronary artery bypass graft.            PLAN:      1. I have no pulmonary function tests on this patient so I have ordered full     pulmonary function tests and a six minute walk test to see how much oxygen we     need to increase when she is ambulating, clearly 2 liters is not enough. We     asked her to increase it to 3 liters.       2. Obtain a dedicated CT scan of the chest without contrast as soon as possible.          3. The patient does not appear to have enough inspiratory capacity to perform     inhaler techniques so I will change everything over to nebulized medications.  I    will start Brovana and Pulmicort nebulizers twice daily and  Lonhala Magnair     twice daily. Once she receives these she will stop trelegy inhaler and change to    albuterol monotherapy nebulizer as needed.       4. Continue Daliresp 500 mcg once daily.       5. Add chronic Azithromycin 250 mg once daily.       6. I have referred the patient to pulmonary rehabilitation.       7. She was given a Prevnar and Fluzone vaccine today.       8. I spent approximately 60 minutes with this patient and an additional 10     minutes coordinating care.  She is high risk for death  within the next 1 year     based on her recurrent hospitalizations and her decompensated respiratory     failure at this time. For this reason I would like to start her on a Trilogy     machine to be worn at night. We will have this sent to her house once it is     approved through insurance.       9. We will see her back in 1 month.            Patient Education      Patient Education Provided:  COPD, How to use a Nebulizer      Time Spent:  > 50% /Coord Care            Patient Education:        Chronic Obstructive Pulmonary Disease                 Disclaimer: Converted document may not contain table formatting or lab diagrams. Please see [email or web address] for the authenticated document.

## (undated) DEVICE — SYR LUERLOK 20CC

## (undated) DEVICE — GLV SURG TRIUMPH CLASSIC PF LTX 8.5 STRL

## (undated) DEVICE — DRSNG WND GZ CURAD OIL EMULSION 3X8IN LF STRL 1PK

## (undated) DEVICE — ENCORE® LATEX ORTHO SIZE 8.5, STERILE LATEX POWDER-FREE SURGICAL GLOVE: Brand: ENCORE

## (undated) DEVICE — PICO 7 10CM X 30CM: Brand: PICO™ 7

## (undated) DEVICE — INTENT TO BE USED WITH SUTURE MATERIAL FOR TISSUE CLOSURE: Brand: RICHARD-ALLAN® NEEDLE 1/2 CIRCLE TAPER

## (undated) DEVICE — MARKR SKIN W/RULR AND LBL

## (undated) DEVICE — PK HIP TOTL 40

## (undated) DEVICE — REFLECTION FLEXIBLE DRILL 25MM: Brand: REFLECTION

## (undated) DEVICE — SUT VICRYL 1 CT1 27IN  JJ40G

## (undated) DEVICE — APPL DURAPREP IODOPHOR APL 26ML

## (undated) DEVICE — ZIP 16 SURGICAL SKIN CLOSURE DEVICE, PSA: Brand: ZIP 16 SURGICAL SKIN CLOSURE DEVICE

## (undated) DEVICE — KT DRN EVAC WND PVC PCH WTROC RND 10F400

## (undated) DEVICE — VITAL SIGNS™ ADULT ANESTHESIA BREATHING CIRCUIT: Brand: VITAL SIGNS™

## (undated) DEVICE — BANDAGE,GAUZE,BULKEE II,4.5"X4.1YD,STRL: Brand: MEDLINE

## (undated) DEVICE — GAUZE,SPONGE,FLUFF,6"X6.75",STRL,10/TRAY: Brand: MEDLINE

## (undated) DEVICE — REFLECTION FLEXIBLE DRILL 35MM: Brand: REFLECTION

## (undated) DEVICE — GLV SURG TRIUMPH CLASSIC PF LTX 8 STRL

## (undated) DEVICE — PILLW ABD SM

## (undated) DEVICE — CULT AER/ANAEROB FASTIDIOUS BACT

## (undated) DEVICE — NDL SPINE 20G 3 1/2 YEL STRL 1P/U

## (undated) DEVICE — ANTIBACTERIAL UNDYED BRAIDED (POLYGLACTIN 910), SYNTHETIC ABSORBABLE SUTURE: Brand: COATED VICRYL

## (undated) DEVICE — SYR LUERLOK 20CC BX/50